# Patient Record
Sex: FEMALE | Race: WHITE | NOT HISPANIC OR LATINO | Employment: FULL TIME | URBAN - METROPOLITAN AREA
[De-identification: names, ages, dates, MRNs, and addresses within clinical notes are randomized per-mention and may not be internally consistent; named-entity substitution may affect disease eponyms.]

---

## 2017-03-13 ENCOUNTER — TELEPHONE (OUTPATIENT)
Dept: NURSING | Facility: CLINIC | Age: 32
End: 2017-03-13

## 2017-03-13 ENCOUNTER — OFFICE VISIT (OUTPATIENT)
Dept: FAMILY MEDICINE | Facility: CLINIC | Age: 32
End: 2017-03-13
Payer: COMMERCIAL

## 2017-03-13 VITALS
SYSTOLIC BLOOD PRESSURE: 112 MMHG | DIASTOLIC BLOOD PRESSURE: 76 MMHG | HEART RATE: 79 BPM | TEMPERATURE: 98 F | OXYGEN SATURATION: 100 % | BODY MASS INDEX: 30.52 KG/M2 | HEIGHT: 66 IN | WEIGHT: 189.9 LBS

## 2017-03-13 DIAGNOSIS — N89.8 VAGINAL ODOR: ICD-10-CM

## 2017-03-13 DIAGNOSIS — F41.9 ANXIETY: ICD-10-CM

## 2017-03-13 DIAGNOSIS — F33.0 MAJOR DEPRESSIVE DISORDER, RECURRENT EPISODE, MILD (H): Primary | ICD-10-CM

## 2017-03-13 DIAGNOSIS — O24.410 DIET CONTROLLED GESTATIONAL DIABETES MELLITUS (GDM), ANTEPARTUM: ICD-10-CM

## 2017-03-13 PROCEDURE — 99213 OFFICE O/P EST LOW 20 MIN: CPT | Performed by: INTERNAL MEDICINE

## 2017-03-13 RX ORDER — ESCITALOPRAM OXALATE 10 MG/1
10 TABLET ORAL EVERY EVENING
Qty: 90 TABLET | Refills: 1 | Status: SHIPPED | OUTPATIENT
Start: 2017-03-13 | End: 2017-09-14

## 2017-03-13 ASSESSMENT — ANXIETY QUESTIONNAIRES
3. WORRYING TOO MUCH ABOUT DIFFERENT THINGS: MORE THAN HALF THE DAYS
GAD7 TOTAL SCORE: 13
5. BEING SO RESTLESS THAT IT IS HARD TO SIT STILL: SEVERAL DAYS
1. FEELING NERVOUS, ANXIOUS, OR ON EDGE: NEARLY EVERY DAY
IF YOU CHECKED OFF ANY PROBLEMS ON THIS QUESTIONNAIRE, HOW DIFFICULT HAVE THESE PROBLEMS MADE IT FOR YOU TO DO YOUR WORK, TAKE CARE OF THINGS AT HOME, OR GET ALONG WITH OTHER PEOPLE: VERY DIFFICULT
6. BECOMING EASILY ANNOYED OR IRRITABLE: SEVERAL DAYS
2. NOT BEING ABLE TO STOP OR CONTROL WORRYING: MORE THAN HALF THE DAYS
7. FEELING AFRAID AS IF SOMETHING AWFUL MIGHT HAPPEN: MORE THAN HALF THE DAYS

## 2017-03-13 ASSESSMENT — PATIENT HEALTH QUESTIONNAIRE - PHQ9: 5. POOR APPETITE OR OVEREATING: MORE THAN HALF THE DAYS

## 2017-03-13 NOTE — PROGRESS NOTES
"  SUBJECTIVE:                                                    Abi Dawkins is a 32 year old female who presents to clinic today for the following health issues:    Chief Complaint   Patient presents with     Recheck Medication     discuss depression and anxiety      Abi is a pleasant 31 YO female here to discuss restarting Lexapro. Had a baby boy since last OV (Oct. 3rd 2016), she reports that she is sleeping better lately--not currently breast feeding or pumping.   Not currently taking Lexapro-- DCed prior to pregnancy. She feels that currently she is experiencing more anxiety and is uncertain how much of it is related to being a new parent. Many years ago Abi reports having worked with a counselor but no longer follows with one. Abi reports that her sex drive has decreased lately too.   Had dx of gestational diabetes--reports that she checked her BG daily with no high readings despite no medication and no diet changes average range of 80s fasting in the AM - just had high readings in the OB office.  Pt reports that she has been having increased vaginal odor--follows with OBGYN, has not begun regular menses again post partum, denies abnormal discharge.    Problem list and histories reviewed & adjusted, as indicated.    ROS:  Detailed as above    This document serves as a record of the services and decisions personally performed and made by Joyce Powell DO. It was created on his/her behalf by Zonia Hernandez, a trained medical scribe. The creation of this document is based the provider's statements to the medical scribe.  Scribkirit Hernandez 8:49 AM, March 13, 2017    OBJECTIVE:                                                    /76 (BP Location: Right arm, Patient Position: Chair, Cuff Size: Adult Regular)  Pulse 79  Temp 98  F (36.7  C) (Oral)  Ht 5' 6\" (1.676 m)  Wt 189 lb 14.4 oz (86.1 kg)  SpO2 100%  Breastfeeding? No  BMI 30.65 kg/m2  Body mass index is 30.65 kg/(m^2).  Alert, " pleasant, NAD  Mood euthymic     ASSESSMENT/PLAN:                                                      1. Major depressive disorder, recurrent episode, mild (H)  Will follow up in 1-2 months to determine if 10 mg dosage is appropriate and complete PHQ-9.  Requested 90 day supply since had been on this medication in the past.  - MENTAL HEALTH REFERRAL  - escitalopram (LEXAPRO) 10 MG tablet; Take 1 tablet (10 mg) by mouth every evening  Dispense: 90 tablet; Refill: 1    PHQ-9 SCORE 2/23/2016 11/15/2016 3/13/2017   Total Score - - -   Total Score 4 4 14       2. Anxiety  Post partum--Oct 2016 birth 1st child. Pt reports feeling more anxious as a new parent.   Will follow up in 1-2 months and complete LUCERO-7 again.   Counseling advised.  No imminent safety concerns noted for Abi or baby at this time  - MENTAL HEALTH REFERRAL  - escitalopram (LEXAPRO) 10 MG tablet; Take 1 tablet (10 mg) by mouth every evening  Dispense: 90 tablet; Refill: 1    LUCERO-7 SCORE 2/23/2016 11/15/2016 3/13/2017   Total Score - - -   Total Score 3 4 13     3. Diet controlled gestational diabetes mellitus (GDM), antepartum  Pt denies daily at home BG checks ever being above 200 with average sugars around 80 fasting in the AM.   Follows with OB/GYN and was diet controlled throughout pregnancy.    4. Vaginal odor  Hx of BV post partum.  Denies abnormal discharge, burning with urination or itching at this time - just a different odor.   Counseled patient to follow up with OB/GYN if symptoms persist.     Patient Instructions   You will be contacted to schedule an appointment with a counselor.  Begin taking 10 mg Lexapro daily-- it can make you sleepy.  Follow up with me in 1-2 month by phone--we will help set this up.    The information in this document, created by the medical scribe for me, accurately reflects the services I personally performed and the decisions made by me. I have reviewed and approved this document for accuracy prior to leaving the  patient care area.  Joyce Powell DO  8:49 AM, 03/13/17    Joyce Powell DO  [facility]

## 2017-03-13 NOTE — TELEPHONE ENCOUNTER
Pt seen for BV 10/14/16. Pt is currently having a lot of vagina order. Pt has to change underwear aleast twice a day. Pt denies vaginal itching. No fever. Informed pt she needs to be seen to determine if the BV has returned.

## 2017-03-13 NOTE — NURSING NOTE
"Chief Complaint   Patient presents with     Recheck Medication     discuss depression and anxiety        Initial /76 (BP Location: Right arm, Patient Position: Chair, Cuff Size: Adult Regular)  Pulse 79  Temp 98  F (36.7  C) (Oral)  Ht 5' 6\" (1.676 m)  Wt 189 lb 14.4 oz (86.1 kg)  SpO2 100%  Breastfeeding? No  BMI 30.65 kg/m2 Estimated body mass index is 30.65 kg/(m^2) as calculated from the following:    Height as of this encounter: 5' 6\" (1.676 m).    Weight as of this encounter: 189 lb 14.4 oz (86.1 kg).  Medication Reconciliation: complete     ALBERTA Sanchez      "

## 2017-03-13 NOTE — PATIENT INSTRUCTIONS
You will be contacted to schedule an appointment with a counselor.  Begin taking 10 mg Lexapro daily-- it can make you sleepy.  Follow up with me in 1-2 month by phone--we will help set this up.

## 2017-03-13 NOTE — LETTER
My Depression Action Plan  Name: Abi Dawkins   Date of Birth 1985  Date: 3/13/2017    My doctor: Joyce Powell   My clinic: [address] [id number] [phone]          GREEN    ZONE   Good Control    What it looks like:     Things are going generally well. You have normal up s and down s. You may even feel depressed from time to time, but bad moods usually last less than a day.   What you need to do:  1. Continue to care for yourself (see self care plan)  2. Check your depression survival kit and update it as needed  3. Follow your physician s recommendations including any medication.  4. Do not stop taking medication unless you consult with your physician first.           YELLOW         ZONE Getting Worse    What it looks like:     Depression is starting to interfere with your life.     It may be hard to get out of bed; you may be starting to isolate yourself from others.    Symptoms of depression are starting to last most all day and this has happened for several days.     You may have suicidal thoughts but they are not constant.   What you need to do:     1. Call your care team, your response to treatment will improve if you keep your care team informed of your progress. Yellow periods are signs an adjustment may need to be made.     2. Continue your self-care, even if you have to fake it!    3. Talk to someone in your support network    4. Open up your depression survival kit           RED    ZONE Medical Alert - Get Help    What it looks like:     Depression is seriously interfering with your life.     You may experience these or other symptoms: You can t get out of bed most days, can t work or engage in other necessary activities, you have trouble taking care of basic hygiene, or basic responsibilities, thoughts of suicide or death that will not go away, self-injurious behavior.     What you need to do:  1. Call your care team and request  a same-day appointment. If they are not available (weekends or after hours) call your local crisis line, emergency room or 911.      Electronically signed by: Joyce Powell, March 13, 2017    Depression Self Care Plan / Survival Kit    Self-Care for Depression  Here s the deal. Your body and mind are really not as separate as most people think.  What you do and think affects how you feel and how you feel influences what you do and think. This means if you do things that people who feel good do, it will help you feel better.  Sometimes this is all it takes.  There is also a place for medication and therapy depending on how severe your depression is, so be sure to consult with your medical provider and/ or Behavioral Health Consultant if your symptoms are worsening or not improving.     In order to better manage my stress, I will:    Exercise  Get some form of exercise, every day. This will help reduce pain and release endorphins, the  feel good  chemicals in your brain. This is almost as good as taking antidepressants!  This is not the same as joining a gym and then never going! (they count on that by the way ) It can be as simple as just going for a walk or doing some gardening, anything that will get you moving.      Hygiene   Maintain good hygiene (Get out of bed in the morning, Make your bed, Brush your teeth, Take a shower, and Get dressed like you were going to work, even if you are unemployed).  If your clothes don't fit try to get ones that do.    Diet  I will strive to eat foods that are good for me, drink plenty of water, and avoid excessive sugar, caffeine, alcohol, and other mood-altering substances.  Some foods that are helpful in depression are: complex carbohydrates, B vitamins, flaxseed, fish or fish oil, fresh fruits and vegetables.    Psychotherapy  I agree to participate in Individual Therapy (if recommended).    Medication  If prescribed medications, I agree to take them.  Missing doses can  result in serious side effects.  I understand that drinking alcohol, or other illicit drug use, may cause potential side effects.  I will not stop my medication abruptly without first discussing it with my provider.    Staying Connected With Others  I will stay in touch with my friends, family members, and my primary care provider/team.    Use your imagination  Be creative.  We all have a creative side; it doesn t matter if it s oil painting, sand castles, or mud pies! This will also kick up the endorphins.    Witness Beauty  (AKA stop and smell the roses) Take a look outside, even in mid-winter. Notice colors, textures. Watch the squirrels and birds.     Service to others  Be of service to others.  There is always someone else in need.  By helping others we can  get out of ourselves  and remember the really important things.  This also provides opportunities for practicing all the other parts of the program.    Humor  Laugh and be silly!  Adjust your TV habits for less news and crime-drama and more comedy.    Control your stress  Try breathing deep, massage therapy, biofeedback, and meditation. Find time to relax each day.     My support system    Clinic Contact:  Phone number:    Contact 1:  Phone number:    Contact 2:  Phone number:    Anabaptist/:  Phone number:    Therapist:  Phone number:    Local crisis center:    Phone number:    Other community support:  Phone number:

## 2017-03-13 NOTE — MR AVS SNAPSHOT
After Visit Summary   3/13/2017    Abi Dawkins    MRN: [id number]           Patient Information     Date Of Birth          1985        Visit Information        Provider Department      3/13/2017 8:30 AM Joyce Powell, DO [facility]        Today's Diagnoses     Major depressive disorder, recurrent episode, mild (H)    -  1    Anxiety          Care Instructions    You will be contacted to schedule an appointment with a counselor.  Begin taking 10 mg Lexapro daily-- it can make you sleepy.  Follow up with me in 1-2 month by phone--we will help set this up.        Follow-ups after your visit        Additional Services     MENTAL HEALTH REFERRAL       Your provider has referred you to: [facility]: [facility] - Counseling (Individual/Couples/Family) - [facility] [place] [phone]   [email or web address]   *Patient will be contacted by [facility] scheduling partner, [facility]), to schedule an appointment.  Patients may also call [facility] to schedule.    All scheduling is subject to the client's specific insurance plan & benefits, provider/location availability, and provider clinical specialities.  Please arrive 15 minutes early for your first appointment and bring your completed paperwork.    Please be aware that coverage of these services is subject to the terms and limitations of your health insurance plan.  Call member services at your health plan with any benefit or coverage questions.                  Who to contact     If you have questions or need follow up information about today's clinic visit or your schedule please contact [facility] directly at [phone].  Normal or non-critical lab and imaging results will be communicated to you by MyChart, letter or phone within 4 business days after the clinic has received the results. If you do not hear from us within 7 days, please  "contact the clinic through [employer] or phone. If you have a critical or abnormal lab result, we will notify you by phone as soon as possible.  Submit refill requests through [employer] or call your pharmacy and they will forward the refill request to us. Please allow 3 business days for your refill to be completed.          Additional Information About Your Visit        [facility] Information     [employer] gives you secure access to your electronic health record. If you see a primary care provider, you can also send messages to your care team and make appointments. If you have questions, please call your primary care clinic.  If you do not have a primary care provider, please call [phone] and they will assist you.        Care EveryWhere ID     This is your Care EveryWhere ID. This could be used by other organizations to access your [place] medical records  [id number]        Your Vitals Were     Pulse Temperature Height Pulse Oximetry Breastfeeding? BMI (Body Mass Index)    79 98  F (36.7  C) (Oral) 5' 6\" (1.676 m) 100% No 30.65 kg/m2       Blood Pressure from Last 3 Encounters:   03/13/17 112/76   11/15/16 106/60   10/14/16 128/86    Weight from Last 3 Encounters:   03/13/17 189 lb 14.4 oz (86.1 kg)   11/15/16 188 lb (85.3 kg)   10/14/16 188 lb (85.3 kg)              We Performed the Following     MENTAL HEALTH REFERRAL          Today's Medication Changes          These changes are accurate as of: 3/13/17  9:07 AM.  If you have any questions, ask your nurse or doctor.               Start taking these medicines.        Dose/Directions    escitalopram 10 MG tablet   Commonly known as:  LEXAPRO   Used for:  Major depressive disorder, recurrent episode, mild (H), Anxiety   Started by:  Joyce Powell,         Dose:  10 mg   Take 1 tablet (10 mg) by mouth every evening   Quantity:  90 tablet   Refills:  1         Stop taking these medicines if you haven't already. Please contact your care team if you have questions.  "    order for DME   Stopped by:  Joyce Powell DO                Where to get your medicines      These medications were sent to [facility]/pharmacy #[id number] - [place], [place] - [address]  [address] [address]     Phone:  [phone]     escitalopram 10 MG tablet                Primary Care Provider Office Phone # Fax #    Joyce Powell [phone]       [place] [address]   [facility] [address]        Thank you!     Thank you for choosing [facility]  for your care. Our goal is always to provide you with excellent care. Hearing back from our patients is one way we can continue to improve our services. Please take a few minutes to complete the written survey that you may receive in the mail after your visit with us. Thank you!             Your Updated Medication List - Protect others around you: Learn how to safely use, store and throw away your medicines at www.disposemymeds.org.          This list is accurate as of: 3/13/17  9:07 AM.  Always use your most recent med list.                   Brand Name Dispense Instructions for use    escitalopram 10 MG tablet    LEXAPRO    90 tablet    Take 1 tablet (10 mg) by mouth every evening       ibuprofen 600 MG tablet    ADVIL/MOTRIN     Take 600 mg by mouth every 8 hours as needed for moderate pain       TYLENOL PO

## 2017-03-14 ENCOUNTER — OFFICE VISIT (OUTPATIENT)
Dept: OBGYN | Facility: CLINIC | Age: 32
End: 2017-03-14
Payer: COMMERCIAL

## 2017-03-14 VITALS — DIASTOLIC BLOOD PRESSURE: 74 MMHG | WEIGHT: 189 LBS | SYSTOLIC BLOOD PRESSURE: 104 MMHG | BODY MASS INDEX: 30.51 KG/M2

## 2017-03-14 DIAGNOSIS — N89.8 VAGINAL DISCHARGE: Primary | ICD-10-CM

## 2017-03-14 LAB
MICRO REPORT STATUS: NORMAL
SPECIMEN SOURCE: NORMAL
WET PREP SPEC: NORMAL

## 2017-03-14 PROCEDURE — 99213 OFFICE O/P EST LOW 20 MIN: CPT | Performed by: OBSTETRICS & GYNECOLOGY

## 2017-03-14 PROCEDURE — 87210 SMEAR WET MOUNT SALINE/INK: CPT | Performed by: OBSTETRICS & GYNECOLOGY

## 2017-03-14 RX ORDER — PROPRANOLOL HYDROCHLORIDE 20 MG/1
TABLET ORAL
COMMUNITY
Start: 2010-04-09 | End: 2017-12-13

## 2017-03-14 RX ORDER — RIZATRIPTAN BENZOATE 5 MG/1
TABLET, ORALLY DISINTEGRATING ORAL
COMMUNITY
Start: 2011-01-31 | End: 2017-12-13

## 2017-03-14 ASSESSMENT — PATIENT HEALTH QUESTIONNAIRE - PHQ9: SUM OF ALL RESPONSES TO PHQ QUESTIONS 1-9: 14

## 2017-03-14 ASSESSMENT — ANXIETY QUESTIONNAIRES: GAD7 TOTAL SCORE: 13

## 2017-03-14 NOTE — PROGRESS NOTES
SUBJECTIVE:                                                   Abi Dawkins is a 32 year old female who presents to clinic today for the following health issue(s):  Patient presents with:  Vaginal Problem: discharge and odor        HPI:  Patient has had odor and some discharge for a few weeks.   Stopped breast feeding 3 wks ago  No itching  WS negative  I think she just has a shift in her helen related to low estrogen causing elevation of her vaginal ph.   I would just wait this out till her cycles start again.     No LMP recorded..   Patient is not sexually active, .  Using none for contraception.    reports that she has never smoked. She has never used smokeless tobacco.    STD testing offered?  Declined    Health maintenance updated:  yes    Today's PHQ-2 Score:   PHQ-2 (  [employer]) 3/13/2017   Q1: Little interest or pleasure in doing things 1   Q2: Feeling down, depressed or hopeless 2   PHQ-2 Score 3     Today's PHQ-9 Score:   PHQ-9 SCORE 3/13/2017   Total Score -   Total Score 14     Today's LUCERO-7 Score:   LUCERO-7 SCORE 3/13/2017   Total Score -   Total Score 13       Problem list and histories reviewed & adjusted, as indicated.  Additional history: as documented.    Patient Active Problem List   Diagnosis     Anxiety     Migraines     Hyperlipidemia with target LDL less than 130     Major depression/anxiety  in complete remission since 12 on meds      Open wound s/po sut removal 7-31-15 and inital suturing 7-19-15      Dislocation of toe of left foot and contusion at 2nd MT joint DOI 7-19-15     Major depressive disorder, recurrent episode, mild (H)     Past Surgical History   Procedure Laterality Date     Ent surgery       typanamastoidectomy     Ent surgery Left [id number]     10 T-tube placements     Lasik customvue bilateral Bilateral 2015     Procedure: LASIK CUSTOMVUE BILATERAL;  Surgeon: Maycol Bella MD;  Location: [facility]     [place] screening Bilateral 2015      Procedure: WAVESCAN SCREENING;  Surgeon: Jena, Maycol RAGLAND MD;  [facility]      Social History   Substance Use Topics     Smoking status: Never Smoker     Smokeless tobacco: Never Used     Alcohol use No      Problem (# of Occurrences) Relation (Name,Age of Onset)    Allergies (2) Father, Brother    Anxiety Disorder (1) Brother    Asthma (1) Brother    DIABETES (1) Father    High cholesterol (2) Father, Brother    Hypertension (2) Father, Brother            Current Outpatient Prescriptions   Medication Sig     propranolol (INDERAL) 20 MG tablet      rizatriptan (MAXALT-MLT) 5 MG ODT tab LW Addl Instr:Place on tongue to dissolve 1 tablet at the onset of a typical  headache as needed.  May repeat after 2 hours if headache still  present.  Maximum of 3 tablets/24 hours.  Maximum 9 days/month.  Indicated for: Migraine Headaches     escitalopram (LEXAPRO) 10 MG tablet Take 1 tablet (10 mg) by mouth every evening     ibuprofen (ADVIL,MOTRIN) 600 MG tablet Take 600 mg by mouth every 8 hours as needed for moderate pain     Acetaminophen (TYLENOL PO)      No current facility-administered medications for this visit.      Allergies   Allergen Reactions     Cats      Runny nose, redness under eyes, sniffling       ROS:  12 point review of systems negative other than symptoms noted below.  Genitourinary: Vaginal Discharge and Vaginal odor    OBJECTIVE:     /74  Wt 85.7 kg (189 lb)  Breastfeeding? No  BMI 30.51 kg/m2  Body mass index is 30.51 kg/(m^2).    Exam:  Constitutional:  Appearance: Well nourished, well developed alert, in no acute distress  Pelvic Exam:  External Genitalia:     Normal appearance for age, no discharge present, no tenderness present, no inflammatory lesions present, color normal  Vagina:     Normal vaginal vault without central or paravaginal defects, no discharge present, no inflammatory lesions present, no masses present  Bladder:     Nontender to palpation  Urethra:   Urethral Body:  Urethra  palpation normal, urethra structural support normal   Urethral Meatus:  No erythema or lesions present  Cervix:     Appearance healthy, no lesions present, nontender to palpation, no bleeding present  Uterus:     Nontender to palpation, no masses present, position anteflexed, mobility: normal  Adnexa:     No adnexal tenderness present, no adnexal masses present  Perineum:     Perineum within normal limits, no evidence of trauma, no rashes or skin lesions present  Anus:     Anus within normal limits, no hemorrhoids present  Inguinal Lymph Nodes:     No lymphadenopathy present  Pubic Hair:     Normal pubic hair distribution for age  Genitalia and Groin:     No rashes present, no lesions present, no areas of discoloration, no masses present     In-Clinic Test Results:  Results for orders placed or performed in visit on 03/14/17 (from the past 24 hour(s))   Wet prep   Result Value Ref Range    Specimen Description Vagina     Wet Prep       No yeast seen  No clue cells seen  No Trichomonas seen      Micro Report Status FINAL 03/14/2017        ASSESSMENT/PLAN:                                                        ICD-10-CM    1. Vaginal discharge N89.8 Wet prep       There are no Patient Instructions on file for this visit.    Face to face time 15 minute  Discussed vaginal discharge and infections    Radha Macias MD  [facility]

## 2017-03-14 NOTE — MR AVS SNAPSHOT
After Visit Summary   3/14/2017    Abi Dawkins    MRN: [id number]           Patient Information     Date Of Birth          1985        Visit Information        Provider Department      3/14/2017 8:30 AM Radha Macias MD [facility]        Today's Diagnoses     Vaginal discharge    -  1       Follow-ups after your visit        Who to contact     If you have questions or need follow up information about today's clinic visit or your schedule please contact [facility] directly at [phone].  Normal or non-critical lab and imaging results will be communicated to you by TutorGrouphart, letter or phone within 4 business days after the clinic has received the results. If you do not hear from us within 7 days, please contact the clinic through Salsifyt or phone. If you have a critical or abnormal lab result, we will notify you by phone as soon as possible.  Submit refill requests through Zaldiva or call your pharmacy and they will forward the refill request to us. Please allow 3 business days for your refill to be completed.          Additional Information About Your Visit        MyChart Information     Zaldiva gives you secure access to your electronic health record. If you see a primary care provider, you can also send messages to your care team and make appointments. If you have questions, please call your primary care clinic.  If you do not have a primary care provider, please call [phone] and they will assist you.        Care EveryWhere ID     This is your Care EveryWhere ID. This could be used by other organizations to access your [facility] medical records  [id number]        Your Vitals Were     Breastfeeding? BMI (Body Mass Index)                No 30.51 kg/m2           Blood Pressure from Last 3 Encounters:   03/14/17 104/74   03/13/17 112/76   11/15/16 106/60    Weight from Last 3 Encounters:   03/14/17 85.7 kg (189 lb)   03/13/17 86.1 kg (189 lb  14.4 oz)   11/15/16 85.3 kg (188 lb)              We Performed the Following     Wet prep        Primary Care Provider Office Phone # Fax #    Joyce Powell [phone]       [facility] [address] [facility] [address]  [facility] [address]        Thank you!     Thank you for choosing [facility]  for your care. Our goal is always to provide you with excellent care. Hearing back from our patients is one way we can continue to improve our services. Please take a few minutes to complete the written survey that you may receive in the mail after your visit with us. Thank you!             Your Updated Medication List - Protect others around you: Learn how to safely use, store and throw away your medicines at www.disposemymeds.org.          This list is accurate as of: 3/14/17  2:09 PM.  Always use your most recent med list.                   Brand Name Dispense Instructions for use    escitalopram 10 MG tablet    LEXAPRO    90 tablet    Take 1 tablet (10 mg) by mouth every evening       ibuprofen 600 MG tablet    ADVIL/MOTRIN     Take 600 mg by mouth every 8 hours as needed for moderate pain       propranolol 20 MG tablet    INDERAL         rizatriptan 5 MG ODT tab    MAXALT-MLT     LW Addl Instr:Place on tongue to dissolve 1 tablet at the onset of a typical  headache as needed.  May repeat after 2 hours if headache still  present.  Maximum of 3 tablets/24 hours.  Maximum 9 days/month.  Indicated for: Migraine Headaches       TYLENOL PO

## 2017-03-17 ENCOUNTER — TELEPHONE (OUTPATIENT)
Dept: FAMILY MEDICINE | Facility: CLINIC | Age: 32
End: 2017-03-17

## 2017-03-17 NOTE — TELEPHONE ENCOUNTER
Patient needs f/u visit for medication check in roughly 6 weeks  This can be via phone (if covered by her insurance) or in clinic  Please call her to get this follow up scheduled  Thanks!

## 2017-03-26 PROBLEM — O24.410 DIET CONTROLLED GESTATIONAL DIABETES MELLITUS (GDM), ANTEPARTUM: Status: ACTIVE | Noted: 2017-03-26

## 2017-05-01 ENCOUNTER — TELEPHONE (OUTPATIENT)
Dept: FAMILY MEDICINE | Facility: CLINIC | Age: 32
End: 2017-05-01

## 2017-05-01 ENCOUNTER — VIRTUAL VISIT (OUTPATIENT)
Dept: FAMILY MEDICINE | Facility: CLINIC | Age: 32
End: 2017-05-01
Payer: COMMERCIAL

## 2017-05-01 DIAGNOSIS — F33.0 MAJOR DEPRESSIVE DISORDER, RECURRENT EPISODE, MILD (H): ICD-10-CM

## 2017-05-01 DIAGNOSIS — F41.9 ANXIETY: ICD-10-CM

## 2017-05-01 PROCEDURE — 99441 ZZC PHYSICIAN TELEPHONE EVALUATION 5-10 MIN: CPT | Performed by: INTERNAL MEDICINE

## 2017-05-01 RX ORDER — ESCITALOPRAM OXALATE 10 MG/1
10 TABLET ORAL EVERY EVENING
Qty: 90 TABLET | Refills: 1 | Status: CANCELLED | OUTPATIENT
Start: 2017-05-01

## 2017-05-01 ASSESSMENT — ANXIETY QUESTIONNAIRES
6. BECOMING EASILY ANNOYED OR IRRITABLE: SEVERAL DAYS
2. NOT BEING ABLE TO STOP OR CONTROL WORRYING: NOT AT ALL
3. WORRYING TOO MUCH ABOUT DIFFERENT THINGS: NOT AT ALL
1. FEELING NERVOUS, ANXIOUS, OR ON EDGE: SEVERAL DAYS
IF YOU CHECKED OFF ANY PROBLEMS ON THIS QUESTIONNAIRE, HOW DIFFICULT HAVE THESE PROBLEMS MADE IT FOR YOU TO DO YOUR WORK, TAKE CARE OF THINGS AT HOME, OR GET ALONG WITH OTHER PEOPLE: NOT DIFFICULT AT ALL
5. BEING SO RESTLESS THAT IT IS HARD TO SIT STILL: NOT AT ALL
GAD7 TOTAL SCORE: 3
7. FEELING AFRAID AS IF SOMETHING AWFUL MIGHT HAPPEN: SEVERAL DAYS

## 2017-05-01 ASSESSMENT — PATIENT HEALTH QUESTIONNAIRE - PHQ9: 5. POOR APPETITE OR OVEREATING: NOT AT ALL

## 2017-05-01 NOTE — MR AVS SNAPSHOT
After Visit Summary   5/1/2017    Abi Dawkins    MRN: [id number]           Patient Information     Date Of Birth          1985        Visit Information        Provider Department      5/1/2017 1:00 PM Joyce Powell, DO [facility]        Today's Diagnoses     Major depressive disorder, recurrent episode, mild (H)        Anxiety           Follow-ups after your visit        Who to contact     If you have questions or need follow up information about today's clinic visit or your schedule please contact [facility] directly at [phone].  Normal or non-critical lab and imaging results will be communicated to you by So Protect Mehart, letter or phone within 4 business days after the clinic has received the results. If you do not hear from us within 7 days, please contact the clinic through Global Axcesst or phone. If you have a critical or abnormal lab result, we will notify you by phone as soon as possible.  Submit refill requests through iPierian or call your pharmacy and they will forward the refill request to us. Please allow 3 business days for your refill to be completed.          Additional Information About Your Visit        MyChart Information     iPierian gives you secure access to your electronic health record. If you see a primary care provider, you can also send messages to your care team and make appointments. If you have questions, please call your primary care clinic.  If you do not have a primary care provider, please call [phone] and they will assist you.        Care EveryWhere ID     This is your Care EveryWhere ID. This could be used by other organizations to access your [facility] medical records  [id number]         Blood Pressure from Last 3 Encounters:   03/14/17 104/74   03/13/17 112/76   11/15/16 106/60    Weight from Last 3 Encounters:   03/14/17 189 lb (85.7 kg)   03/13/17 189 lb 14.4 oz (86.1 kg)   11/15/16 188 lb (85.3 kg)              Today, you had the  following     No orders found for display       Primary Care Provider Office Phone # Fax #    oJyce Powell [phone]       [facility] [address] [facility] [address]  [facility] [address]        Thank you!     Thank you for choosing [facility]  for your care. Our goal is always to provide you with excellent care. Hearing back from our patients is one way we can continue to improve our services. Please take a few minutes to complete the written survey that you may receive in the mail after your visit with us. Thank you!             Your Updated Medication List - Protect others around you: Learn how to safely use, store and throw away your medicines at www.disposemymeds.org.          This list is accurate as of: 5/1/17 11:59 PM.  Always use your most recent med list.                   Brand Name Dispense Instructions for use    escitalopram 10 MG tablet    LEXAPRO    90 tablet    Take 1 tablet (10 mg) by mouth every evening       ibuprofen 600 MG tablet    ADVIL/MOTRIN     Take 600 mg by mouth every 8 hours as needed for moderate pain       propranolol 20 MG tablet    INDERAL         rizatriptan 5 MG ODT tab    MAXALT-MLT     LW Addl Instr:Place on tongue to dissolve 1 tablet at the onset of a typical  headache as needed.  May repeat after 2 hours if headache still  present.  Maximum of 3 tablets/24 hours.  Maximum 9 days/month.  Indicated for: Migraine Headaches       TYLENOL PO

## 2017-05-01 NOTE — PROGRESS NOTES
"Abi Dawkins is a 32 year old female who is being evaluated via a telephone visit     The patient has been notified of following:     \"This telephone visit will be conducted via a call between you and your physician/provider. We have found that certain health care needs can be provided without the need for a physical exam.  This service lets us provide the care you need with a short phone conversation.  If a prescription is necessary we can send it directly to your pharmacy.  If lab work is needed we can place an order for that and you can then stop by our lab to have the test done at a later time.    We will bill your insurance company for this service.  Please check with your medical insurance if this type of visit is covered. You may be responsible for the cost of this type of visit if insurance coverage is denied.  The typical cost is $30 (10min), $59 (11-20min) and $85 (21-30min).  Most often these visits are shorter than 10 minutes.    If during the course of the call the physician/provider feels a telephone visit is not appropriate, you will not be charged for this service.\"       Consent has been obtained for this service by 2 care team members: yes. See the scanned image in the medical record.    Abi Dawkins [facility] of  [facility]      I have reviewed and updated the patient's Past Medical History, Social History, Family History and Medication List.    ALLERGIES  Cats    Evelyne Nayak (MA signature)    Additional provider notes: Abi is doing really well. Says \"everything just seems better\". Her  has also noticed that she is doing better too. Improved LUCERO and PHQ scores.   Home today with son Kael. She is taking the 10 mg Lexapro which she had been on in the past.   She asks about if she and her  decide to have another baby, what should she do? She had stopped her Lexapro while pregnant and \"I didn't get sad until later after the baby came\".   I suggested she discuss " with me and her OBGYN if she and her  want to expand their family and see where she is in her life at that time. I told her Lexapro is one of the best SSRIs to be on during pregnancy so likely will plan to continue it.    LUCERO-7 SCORE 11/15/2016 3/13/2017 5/1/2017   Total Score - - -   Total Score 4 13 3     PHQ-9 SCORE 11/15/2016 3/13/2017 5/1/2017   Total Score - - -   Total Score 4 14 3       Plan: F/u in clinic in 5 months. She has enough of Lexapro left to get her to that appointment.    I have reviewed the note as documented above.  This accurately captures the substance of my conversation with the patient, Abi Dawkins.    Total time of call between patient and provider was 6 minutes.    Joyce Powell,   [facility]

## 2017-05-02 ASSESSMENT — PATIENT HEALTH QUESTIONNAIRE - PHQ9: SUM OF ALL RESPONSES TO PHQ QUESTIONS 1-9: 3

## 2017-05-02 ASSESSMENT — ANXIETY QUESTIONNAIRES: GAD7 TOTAL SCORE: 3

## 2017-09-14 DIAGNOSIS — F41.9 ANXIETY: ICD-10-CM

## 2017-09-14 DIAGNOSIS — F33.0 MAJOR DEPRESSIVE DISORDER, RECURRENT EPISODE, MILD (H): ICD-10-CM

## 2017-09-14 RX ORDER — ESCITALOPRAM OXALATE 10 MG/1
10 TABLET ORAL DAILY
Qty: 90 TABLET | Refills: 0 | Status: SHIPPED | OUTPATIENT
Start: 2017-09-14 | End: 2017-12-13

## 2017-09-14 NOTE — TELEPHONE ENCOUNTER
Prescription approved per [facility] Refill Protocol.  Barbara LAYTON RN

## 2017-09-14 NOTE — TELEPHONE ENCOUNTER
escitalopram (LEXAPRO) 10 MG tablet     Last Written Prescription Date: 3/13/17  Last Fill Quantity: 90, # refills: 1  Last Office Visit with [facility] primary care provider:  3/13/17        Last PHQ-9 score on record=   PHQ-9 SCORE 5/1/2017   Total Score -   Total Score 3               Layla RGANADOS(R)

## 2017-12-13 ENCOUNTER — OFFICE VISIT (OUTPATIENT)
Dept: FAMILY MEDICINE | Facility: CLINIC | Age: 32
End: 2017-12-13
Payer: COMMERCIAL

## 2017-12-13 VITALS
HEART RATE: 67 BPM | OXYGEN SATURATION: 97 % | TEMPERATURE: 98 F | WEIGHT: 184 LBS | SYSTOLIC BLOOD PRESSURE: 104 MMHG | BODY MASS INDEX: 29.57 KG/M2 | DIASTOLIC BLOOD PRESSURE: 78 MMHG | HEIGHT: 66 IN

## 2017-12-13 DIAGNOSIS — G43.109 MIGRAINE WITH AURA AND WITHOUT STATUS MIGRAINOSUS, NOT INTRACTABLE: ICD-10-CM

## 2017-12-13 DIAGNOSIS — F41.9 ANXIETY: ICD-10-CM

## 2017-12-13 DIAGNOSIS — F33.0 MAJOR DEPRESSIVE DISORDER, RECURRENT EPISODE, MILD (H): Primary | ICD-10-CM

## 2017-12-13 PROCEDURE — 99213 OFFICE O/P EST LOW 20 MIN: CPT | Performed by: INTERNAL MEDICINE

## 2017-12-13 RX ORDER — RIZATRIPTAN BENZOATE 5 MG/1
5 TABLET, ORALLY DISINTEGRATING ORAL
Qty: 9 TABLET | Refills: 3 | Status: SHIPPED | OUTPATIENT
Start: 2017-12-13 | End: 2018-09-21

## 2017-12-13 RX ORDER — PROPRANOLOL HYDROCHLORIDE 20 MG/1
20 TABLET ORAL DAILY PRN
Qty: 30 TABLET | Refills: 1 | Status: SHIPPED | OUTPATIENT
Start: 2017-12-13 | End: 2018-09-21

## 2017-12-13 RX ORDER — ESCITALOPRAM OXALATE 10 MG/1
15 TABLET ORAL DAILY
Qty: 135 TABLET | Refills: 1 | Status: SHIPPED | OUTPATIENT
Start: 2017-12-13 | End: 2018-07-12

## 2017-12-13 NOTE — PROGRESS NOTES
"  SUBJECTIVE:   Abi Dawkins is a 32 year old female who presents to clinic today for the following health issues:      Depression and Anxiety Follow-Up    Status since last visit: No change    Other associated symptoms:None    Complicating factors: None    Significant life event: No     Current substance abuse: None    Abi initially states she feels she is doing ok on the Lexapro, however, we discussed this in more detail as her scores don't reflect this. She seemed puzzled about how to answer the questionnaire and exactly how she felt. She has been very busy with work and that doesn't seem to be letting up soon. Also has a young son. Just says that she finds its easier to stay at home rather than go out but then clarifies that isn't necessarily a bad thing and we discussed a lot of folks do tend to stay in more when they have a family. She does express some general lack of motivation though. She is still very glad she has restarted on Lexapro as it has definitely been a help for her being back on it.    PHQ-9 Score and MyChart F/U Questions 3/13/2017 5/1/2017 12/13/2017   Total Score 14 3 10   Q9: Suicide Ideation Not at all Not at all Not at all     LUCERO-7 SCORE 3/13/2017 5/1/2017 12/13/2017   Total Score - - -   Total Score 13 3 9         Amount of exercise or physical activity: 2-3 days/week for an average of 30-45 minutes    Problems taking medications regularly: No    Medication side effects: none    Diet: regular (no restrictions)          Problem list and histories reviewed & adjusted, as indicated.    ROS:  Detailed as above     OBJECTIVE:     /78  Pulse 67  Temp 98  F (36.7  C)  Ht 5' 6\" (1.676 m)  Wt 184 lb (83.5 kg)  SpO2 97%  Breastfeeding? No  BMI 29.7 kg/m2  Body mass index is 29.7 kg/(m^2).  Alert, pleasant, NAD  Seems a bit quiet and reserved and vague in her speech and isn't very specific in describing how she feels    ASSESSMENT/PLAN:       1. Anxiety and Major depressive " disorder, recurrent episode, mild (H)  Discussed and decided together to increase her Lexapro slightly  Also discussed normal changes and phases of life and how seasonal changes and work/life stressors can impact mood  - escitalopram (LEXAPRO) 10 MG tablet; Take 1.5 tablets (15 mg) by mouth daily In the evening  Dispense: 135 tablet; Refill: 1    2. Migraine with aura and without status migrainosus, not intractable  Migraines well controlled  - rizatriptan (MAXALT-MLT) 5 MG ODT tab; Take 1 tablet (5 mg) by mouth at onset of headache for migraine  Dispense: 9 tablet; Refill: 3  - propranolol (INDERAL) 20 MG tablet; Take 1 tablet (20 mg) by mouth daily as needed  Dispense: 30 tablet; Refill: 1    Patient Instructions   Increase Lexapro to 15 mg daily  I refilled your migraine and propranolol anxiety medication to have on hand  Schedule an appointment with me in the spring to recheck medication and have pap      Joyce Powell DO  [facility]

## 2017-12-13 NOTE — MR AVS SNAPSHOT
After Visit Summary   12/13/2017    Abi Dawkins    MRN: [id number]           Patient Information     Date Of Birth          1985        Visit Information        Provider Department      12/13/2017 4:30 PM Joyce Powell,  [facility]        Today's Diagnoses     Major depressive disorder, recurrent episode, mild (H)    -  1    Anxiety        Migraine with aura and without status migrainosus, not intractable          Care Instructions    Increase Lexapro to 15 mg daily  I refilled your migraine and propranolol anxiety medication to have on hand  Schedule an appointment with me in the spring to recheck medication and have pap          Follow-ups after your visit        Who to contact     If you have questions or need follow up information about today's clinic visit or your schedule please contact [facility] directly at [phone].  Normal or non-critical lab and imaging results will be communicated to you by Flavourshart, letter or phone within 4 business days after the clinic has received the results. If you do not hear from us within 7 days, please contact the clinic through Flavourshart or phone. If you have a critical or abnormal lab result, we will notify you by phone as soon as possible.  Submit refill requests through Graceful Tables or call your pharmacy and they will forward the refill request to us. Please allow 3 business days for your refill to be completed.          Additional Information About Your Visit        MyChart Information     Graceful Tables gives you secure access to your electronic health record. If you see a primary care provider, you can also send messages to your care team and make appointments. If you have questions, please call your primary care clinic.  If you do not have a primary care provider, please call [phone] and they will assist you.        Care EveryWhere ID     This is your Care EveryWhere ID. This could be used by other organizations to access  "your [place] medical records  [id number]        Your Vitals Were     Pulse Temperature Height Pulse Oximetry Breastfeeding? BMI (Body Mass Index)    67 98  F (36.7  C) 5' 6\" (1.676 m) 97% No 29.7 kg/m2       Blood Pressure from Last 3 Encounters:   12/13/17 104/78   03/14/17 104/74   03/13/17 112/76    Weight from Last 3 Encounters:   12/13/17 184 lb (83.5 kg)   03/14/17 189 lb (85.7 kg)   03/13/17 189 lb 14.4 oz (86.1 kg)              Today, you had the following     No orders found for display         Today's Medication Changes          These changes are accurate as of: 12/13/17  5:12 PM.  If you have any questions, ask your nurse or doctor.               These medicines have changed or have updated prescriptions.        Dose/Directions    escitalopram 10 MG tablet   Commonly known as:  LEXAPRO   This may have changed:  how much to take   Used for:  Major depressive disorder, recurrent episode, mild (H), Anxiety   Changed by:  Joyce Powell DO        Dose:  15 mg   Take 1.5 tablets (15 mg) by mouth daily In the evening   Quantity:  135 tablet   Refills:  1       propranolol 20 MG tablet   Commonly known as:  INDERAL   This may have changed:    - how much to take  - when to take this  - reasons to take this   Used for:  Migraine with aura and without status migrainosus, not intractable   Changed by:  Joyce Powell DO        Dose:  20 mg   Take 1 tablet (20 mg) by mouth daily as needed   Quantity:  30 tablet   Refills:  1       rizatriptan 5 MG ODT tab   Commonly known as:  MAXALT-MLT   This may have changed:  See the new instructions.   Used for:  Migraine with aura and without status migrainosus, not intractable   Changed by:  Joyce Powell DO        Dose:  5 mg   Take 1 tablet (5 mg) by mouth at onset of headache for migraine   Quantity:  9 tablet   Refills:  3         Stop taking these medicines if you haven't already. Please contact your care team if you have questions.     ibuprofen 600 MG " tablet   Commonly known as:  ADVIL/MOTRIN   Stopped by:  Joyce Powell DO           TYLENOL PO   Stopped by:  Joyce Powell DO                Where to get your medicines      These medications were sent to [facility]/pharmacy #[id number] - [place], [place] - [address]  [address] [facility] [address]     Phone:  [phone]     escitalopram 10 MG tablet    propranolol 20 MG tablet    rizatriptan 5 MG ODT tab                Primary Care Provider Office Phone # Fax #    Joyce Powell [phone] [address] [facility] [address]  [facility] [address]        Equal Access to Services     SHI[facility]A [facility] : Hadii feliberto walter hadliao Sosanaz, waaxda luqadaha, qaybta kaalmada adeclaytonyada, minerva adam . So [facility] [phone].    ATENCIÓN: Si habla español, tiene a butts disposición servicios gratuitos de asistencia lingüística. Llame al [phone].    We comply with applicable federal civil rights laws and [place] laws. We do not discriminate on the basis of race, color, national origin, age, disability, sex, sexual orientation, or gender identity.            Thank you!     Thank you for choosing [facility]  for your care. Our goal is always to provide you with excellent care. Hearing back from our patients is one way we can continue to improve our services. Please take a few minutes to complete the written survey that you may receive in the mail after your visit with us. Thank you!             Your Updated Medication List - Protect others around you: Learn how to safely use, store and throw away your medicines at www.disposemymeds.org.          This list is accurate as of: 12/13/17  5:12 PM.  Always use your most recent med list.                   Brand Name Dispense Instructions for use Diagnosis    escitalopram 10 MG tablet    LEXAPRO    135 tablet    Take 1.5 tablets (15 mg) by mouth daily In the evening    Major depressive disorder, recurrent episode,  mild (H), Anxiety       propranolol 20 MG tablet    INDERAL    30 tablet    Take 1 tablet (20 mg) by mouth daily as needed    Migraine with aura and without status migrainosus, not intractable       rizatriptan 5 MG ODT tab    MAXALT-MLT    9 tablet    Take 1 tablet (5 mg) by mouth at onset of headache for migraine    Migraine with aura and without status migrainosus, not intractable

## 2017-12-13 NOTE — PATIENT INSTRUCTIONS
Increase Lexapro to 15 mg daily  I refilled your migraine and propranolol anxiety medication to have on hand  Schedule an appointment with me in the spring to recheck medication and have pap

## 2017-12-13 NOTE — NURSING NOTE
"No chief complaint on file.      Initial /78  Pulse 67  Temp 98  F (36.7  C)  Ht 5' 6\" (1.676 m)  Wt 184 lb (83.5 kg)  SpO2 97%  Breastfeeding? No  BMI 29.7 kg/m2 Estimated body mass index is 29.7 kg/(m^2) as calculated from the following:    Height as of this encounter: 5' 6\" (1.676 m).    Weight as of this encounter: 184 lb (83.5 kg).  Medication Reconciliation: complete     Pierre Azar, TAO     "

## 2017-12-14 DIAGNOSIS — F33.0 MAJOR DEPRESSIVE DISORDER, RECURRENT EPISODE, MILD (H): ICD-10-CM

## 2017-12-14 DIAGNOSIS — F41.9 ANXIETY: ICD-10-CM

## 2017-12-15 ASSESSMENT — ANXIETY QUESTIONNAIRES
7. FEELING AFRAID AS IF SOMETHING AWFUL MIGHT HAPPEN: MORE THAN HALF THE DAYS
6. BECOMING EASILY ANNOYED OR IRRITABLE: MORE THAN HALF THE DAYS
5. BEING SO RESTLESS THAT IT IS HARD TO SIT STILL: SEVERAL DAYS
IF YOU CHECKED OFF ANY PROBLEMS ON THIS QUESTIONNAIRE, HOW DIFFICULT HAVE THESE PROBLEMS MADE IT FOR YOU TO DO YOUR WORK, TAKE CARE OF THINGS AT HOME, OR GET ALONG WITH OTHER PEOPLE: SOMEWHAT DIFFICULT
2. NOT BEING ABLE TO STOP OR CONTROL WORRYING: SEVERAL DAYS
1. FEELING NERVOUS, ANXIOUS, OR ON EDGE: SEVERAL DAYS
3. WORRYING TOO MUCH ABOUT DIFFERENT THINGS: SEVERAL DAYS
GAD7 TOTAL SCORE: 9

## 2017-12-15 ASSESSMENT — PATIENT HEALTH QUESTIONNAIRE - PHQ9
SUM OF ALL RESPONSES TO PHQ QUESTIONS 1-9: 10
5. POOR APPETITE OR OVEREATING: SEVERAL DAYS

## 2017-12-16 ASSESSMENT — ANXIETY QUESTIONNAIRES: GAD7 TOTAL SCORE: 9

## 2017-12-18 RX ORDER — ESCITALOPRAM OXALATE 10 MG/1
TABLET ORAL
Refills: 0
Start: 2017-12-18

## 2018-07-12 DIAGNOSIS — F33.0 MAJOR DEPRESSIVE DISORDER, RECURRENT EPISODE, MILD (H): ICD-10-CM

## 2018-07-12 DIAGNOSIS — F41.9 ANXIETY: ICD-10-CM

## 2018-07-12 NOTE — TELEPHONE ENCOUNTER
PHQ-9 SCORE 3/13/2017 5/1/2017 12/15/2017   Total Score - - -   Total Score 14 3 10       LUCERO-7 SCORE 3/13/2017 5/1/2017 12/15/2017   Total Score - - -   Total Score 13 3 9

## 2018-07-12 NOTE — TELEPHONE ENCOUNTER
"Last Written Prescription Date:  12/13/17  Last Fill Quantity: 135 tablet,  # refills: 1   Last office visit: 12/13/2017 with prescribing provider:  Andre   Future Office Visit:      Requested Prescriptions   Pending Prescriptions Disp Refills     escitalopram (LEXAPRO) 10 MG tablet [Pharmacy Med Name: ESCITALOPRAM 10 MG TABLET] 135 tablet 1     Sig: TAKE 1.5 TABLETS (15 MG) BY MOUTH DAILY IN THE EVENING    SSRIs Protocol Failed    7/12/2018 11:35 AM       Failed - PHQ-9 score less than 5 in past 6 months    Please review last PHQ-9 score.          Failed - Recent (6 mo) or future (30 days) visit within the authorizing provider's specialty    Patient had office visit in the last 6 months or has a visit in the next 30 days with authorizing provider or within the authorizing provider's specialty.  See \"Patient Info\" tab in inbasket, or \"Choose Columns\" in Meds & Orders section of the refill encounter.           Passed - Patient is age 18 or older       Passed - No active pregnancy on record       Passed - No positive pregnancy test in last 12 months          "

## 2018-07-13 RX ORDER — ESCITALOPRAM OXALATE 10 MG/1
TABLET ORAL
Qty: 135 TABLET | Refills: 0 | Status: SHIPPED | OUTPATIENT
Start: 2018-07-13 | End: 2018-09-21

## 2018-07-13 NOTE — TELEPHONE ENCOUNTER
Routing refill request to provider for review/approval because:  Was to f/u in spring after dose increase but has not.  Pended a month with info in pharm comments to schedule.  Due for annual exam and follow up from 12/2017 visit  Please authorize if appropriate.  Thanks,  Tasia Dennis RN

## 2018-07-24 ENCOUNTER — HEALTH MAINTENANCE LETTER (OUTPATIENT)
Age: 33
End: 2018-07-24

## 2018-09-05 ENCOUNTER — TELEPHONE (OUTPATIENT)
Dept: FAMILY MEDICINE | Facility: CLINIC | Age: 33
End: 2018-09-05

## 2018-09-05 NOTE — TELEPHONE ENCOUNTER
Reason for Call:  Same Day Appointment, Requested Provider:  Joyce Powell DO    PCP: Joyce Powell    Reason for visit: annual physical, med follow up, post partum f/u    Duration of symptoms: na    Have you been treated for this in the past? Yes    Additional comments: Pt is established with Dr. Powell, Dr. Powell has very limited availability.  Pt is requesting to transition to either Dr. Lara or Josee in place of Dr. Powell.  Scheduled patient pt w/Dr. Lara on Friday 9/21.  Just notifying Dr. Powell of transition    Can we leave a detailed message on this number? Not Applicable    Phone number patient can be reached at: Other phone number:  No call needed    Best Time: na    Call taken on 9/5/2018 at 5:18 PM by aKmini Aj

## 2018-09-21 ENCOUNTER — OFFICE VISIT (OUTPATIENT)
Dept: FAMILY MEDICINE | Facility: CLINIC | Age: 33
End: 2018-09-21
Payer: COMMERCIAL

## 2018-09-21 VITALS
SYSTOLIC BLOOD PRESSURE: 118 MMHG | OXYGEN SATURATION: 98 % | BODY MASS INDEX: 32.14 KG/M2 | DIASTOLIC BLOOD PRESSURE: 79 MMHG | WEIGHT: 200 LBS | HEIGHT: 66 IN | TEMPERATURE: 99.2 F | HEART RATE: 66 BPM

## 2018-09-21 DIAGNOSIS — Z13.220 SCREENING FOR LIPID DISORDERS: ICD-10-CM

## 2018-09-21 DIAGNOSIS — Z79.899 MEDICATION MANAGEMENT: ICD-10-CM

## 2018-09-21 DIAGNOSIS — E66.09 OBESITY DUE TO EXCESS CALORIES WITHOUT SERIOUS COMORBIDITY, UNSPECIFIED CLASSIFICATION: ICD-10-CM

## 2018-09-21 DIAGNOSIS — Z86.32 HISTORY OF GESTATIONAL DIABETES: ICD-10-CM

## 2018-09-21 DIAGNOSIS — F41.9 ANXIETY: ICD-10-CM

## 2018-09-21 DIAGNOSIS — R73.09 ELEVATED GLUCOSE: ICD-10-CM

## 2018-09-21 DIAGNOSIS — F41.8 PERFORMANCE ANXIETY: ICD-10-CM

## 2018-09-21 DIAGNOSIS — F33.0 MAJOR DEPRESSIVE DISORDER, RECURRENT EPISODE, MILD (H): Primary | ICD-10-CM

## 2018-09-21 DIAGNOSIS — G43.109 MIGRAINE WITH AURA AND WITHOUT STATUS MIGRAINOSUS, NOT INTRACTABLE: ICD-10-CM

## 2018-09-21 DIAGNOSIS — Z13.29 SCREENING FOR THYROID DISORDER: ICD-10-CM

## 2018-09-21 PROBLEM — O24.410 DIET CONTROLLED GESTATIONAL DIABETES MELLITUS (GDM), ANTEPARTUM: Status: RESOLVED | Noted: 2017-03-26 | Resolved: 2018-09-21

## 2018-09-21 LAB
ALBUMIN SERPL-MCNC: 4 G/DL (ref 3.4–5)
ALP SERPL-CCNC: 71 U/L (ref 40–150)
ALT SERPL W P-5'-P-CCNC: 22 U/L (ref 0–50)
ANION GAP SERPL CALCULATED.3IONS-SCNC: 6 MMOL/L (ref 3–14)
AST SERPL W P-5'-P-CCNC: 23 U/L (ref 0–45)
BILIRUB SERPL-MCNC: 0.6 MG/DL (ref 0.2–1.3)
BUN SERPL-MCNC: 19 MG/DL (ref 7–30)
CALCIUM SERPL-MCNC: 9.3 MG/DL (ref 8.5–10.1)
CHLORIDE SERPL-SCNC: 102 MMOL/L (ref 94–109)
CO2 SERPL-SCNC: 31 MMOL/L (ref 20–32)
CREAT SERPL-MCNC: 0.97 MG/DL (ref 0.52–1.04)
GFR SERPL CREATININE-BSD FRML MDRD: 67 ML/MIN/1.7M2
GLUCOSE SERPL-MCNC: 69 MG/DL (ref 70–99)
HBA1C MFR BLD: 5 % (ref 0–5.6)
POTASSIUM SERPL-SCNC: 4.5 MMOL/L (ref 3.4–5.3)
PROT SERPL-MCNC: 7.9 G/DL (ref 6.8–8.8)
SODIUM SERPL-SCNC: 139 MMOL/L (ref 133–144)
TSH SERPL DL<=0.005 MIU/L-ACNC: 1.14 MU/L (ref 0.4–4)

## 2018-09-21 PROCEDURE — 99214 OFFICE O/P EST MOD 30 MIN: CPT | Performed by: INTERNAL MEDICINE

## 2018-09-21 PROCEDURE — 36415 COLL VENOUS BLD VENIPUNCTURE: CPT | Performed by: INTERNAL MEDICINE

## 2018-09-21 PROCEDURE — 83036 HEMOGLOBIN GLYCOSYLATED A1C: CPT | Performed by: INTERNAL MEDICINE

## 2018-09-21 PROCEDURE — 84443 ASSAY THYROID STIM HORMONE: CPT | Performed by: INTERNAL MEDICINE

## 2018-09-21 PROCEDURE — 80053 COMPREHEN METABOLIC PANEL: CPT | Performed by: INTERNAL MEDICINE

## 2018-09-21 RX ORDER — PROPRANOLOL HYDROCHLORIDE 20 MG/1
20 TABLET ORAL DAILY PRN
Qty: 30 TABLET | Refills: 1 | Status: SHIPPED | OUTPATIENT
Start: 2018-09-21 | End: 2019-03-11

## 2018-09-21 RX ORDER — ESCITALOPRAM OXALATE 20 MG/1
20 TABLET ORAL DAILY
Qty: 90 TABLET | Refills: 1 | Status: SHIPPED | OUTPATIENT
Start: 2018-09-21 | End: 2019-11-17

## 2018-09-21 RX ORDER — RIZATRIPTAN BENZOATE 5 MG/1
5 TABLET, ORALLY DISINTEGRATING ORAL
Qty: 9 TABLET | Refills: 3 | Status: SHIPPED | OUTPATIENT
Start: 2018-09-21

## 2018-09-21 ASSESSMENT — ANXIETY QUESTIONNAIRES
2. NOT BEING ABLE TO STOP OR CONTROL WORRYING: NOT AT ALL
3. WORRYING TOO MUCH ABOUT DIFFERENT THINGS: NOT AT ALL
6. BECOMING EASILY ANNOYED OR IRRITABLE: SEVERAL DAYS
5. BEING SO RESTLESS THAT IT IS HARD TO SIT STILL: NOT AT ALL
GAD7 TOTAL SCORE: 4
IF YOU CHECKED OFF ANY PROBLEMS ON THIS QUESTIONNAIRE, HOW DIFFICULT HAVE THESE PROBLEMS MADE IT FOR YOU TO DO YOUR WORK, TAKE CARE OF THINGS AT HOME, OR GET ALONG WITH OTHER PEOPLE: NOT DIFFICULT AT ALL
1. FEELING NERVOUS, ANXIOUS, OR ON EDGE: SEVERAL DAYS
7. FEELING AFRAID AS IF SOMETHING AWFUL MIGHT HAPPEN: SEVERAL DAYS

## 2018-09-21 ASSESSMENT — PATIENT HEALTH QUESTIONNAIRE - PHQ9: 5. POOR APPETITE OR OVEREATING: SEVERAL DAYS

## 2018-09-21 NOTE — PROGRESS NOTES
"  SUBJECTIVE:   Abi Dawkins is a 33 year old female who presents to clinic today for the following health issues:    Patient requested transfer of care  She is here to get established  Weight monse      Duration: 1    Description (location/character/radiation): weight gain anxiety and depression    Intensity:  moderate    Accompanying signs and symptoms: none    History (similar episodes/previous evaluation): None    Precipitating or alleviating factors: None    Therapies tried and outcome: None       Patient has history of depression and anxiety  She has been taking Lexapro 20 mg instead of 15  20 mg is working well for her  She would like to continue  She has a history of gestational diabetes  She has been gaining weight also  She had questions regarding gestational diabetes  She had questions regarding her medication  Has concerned about gaining weight    Problem list and histories reviewed & adjusted, as indicated.  Additional history: as documented    Labs reviewed in EPIC    Reviewed and updated as needed this visit by clinical staff  Tobacco  Allergies  Meds  Soc Hx      Reviewed and updated as needed this visit by Provider         ROS:  Constitutional, neuro, ENT, endocrine, pulmonary, cardiac, gastrointestinal, genitourinary, musculoskeletal, integument and psychiatric systems are negative, except as otherwise noted.    OBJECTIVE:                                                    /79 (BP Location: Right arm, Patient Position: Chair, Cuff Size: Adult Large)  Pulse 66  Temp 99.2  F (37.3  C) (Tympanic)  Ht 5' 6\" (1.676 m)  Wt 200 lb (90.7 kg)  LMP 09/11/2018  SpO2 98%  BMI 32.28 kg/m2  Body mass index is 32.28 kg/(m^2).      GENERAL APPEARANCE: healthy, alert and no distress  EYES: Eyes grossly normal to inspection, PERRL and conjunctivae and sclerae normal  HENT: ear canals and TM's normal and nose and mouth without ulcers or lesions  NECK: no adenopathy  RESP: lungs clear to auscultation " - no rales, rhonchi or wheezes  CV: regular rates and rhythm, normal S1 S2, no S3             ASSESSMENT/PLAN:                                                      Abi was seen today for diabetes and recheck medication.    Diagnoses and all orders for this visit:    Major depressive disorder, recurrent episode, mild (H)  -     escitalopram (LEXAPRO) 20 MG tablet; Take 1 tablet (20 mg) by mouth daily  This dose is working well for her  I educated her about medication is side effects and precautions  Discussed with her weight gain could be related to this medication  Controlling the anxiety and depression is also important  So we will continue the current dose  She will let me know when she is ready to lower down the dose    Anxiety  -     escitalopram (LEXAPRO) 20 MG tablet; Take 1 tablet (20 mg) by mouth daily  See the note above    Migraine with aura and without status migrainosus, not intractable  -     rizatriptan (MAXALT-MLT) 5 MG ODT tab; Take 1 tablet (5 mg) by mouth at onset of headache for migraine  Migraine headaches are less frequent and less often and she is using this medication only as needed    Performance anxiety  -     propranolol (INDERAL) 20 MG tablet; Take 1 tablet (20 mg) by mouth daily as needed  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  Referred her to dietitian  Answered her questions    Screening for lipid disorders  -     Lipid panel reflex to direct LDL Fasting; Future    Other orders  -     DEPRESSION ACTION PLAN (DAP)    She is due  for annual physical  She will schedule that at her earliest convenience  Due for Pap smear      Follow up with Provider -in 3-4 weeks and a schedul physical  Patient Instructions     Labs today  The dose of lexapro is changed to 20 mg daily  Make appointment for physical at your earliest convenience  Make early morning appointment and come fasting for cholesterol testing  Make appointment with dietician  Seek sooner medical attention if there is any worsening of symptoms or problems.        Your BMI is Body mass index is 32.28 kg/(m^2).    What is BMI?  Body mass index (BMI) is one way to tell whether you are at a healthy weight, overweight, or obese. It measures your weight in relation to your height.  A BMI of 18.5 to 24.9 is in the healthy range. A person with a BMI of 25 to 29.9 is considered overweight, and someone with a BMI of 30 or greater is considered obese.  Another way to find out if you are at risk for health problems caused by overweight and obesity is to measure your waist. If you are a woman and your waist is more than 35 inches, or if you are a man and your waist is more than 40 inches, your risk of disease may be higher.  More than two-thirds of American adults are considered overweight or obese. Being overweight or obese increases the risk for further weight gain.  Excess weight may lead to heart disease and diabetes. Creating and following plans for healthy eating and physical activity may help you improve your health.    Methods for maintaining or losing weight.  Weight control is part of healthy lifestyle and includes exercise, emotional health, and healthy eating habits.  Careful eating habits lifelong is the mainstay of weight control.  Though there are significant health benefits from weight loss, long-term weight loss with diet alone may be very difficult to achieve- studies show long-term success with dietary management in less than 10% of people. Attaining a healthy weight may be  especially difficult to achieve in those with severe obesity. In some cases, medications, devices and surgical management might be considered.    What can you do?  If you are overweight or obese and are interested in methods for weight loss, you should discuss this with your provider. In addition, we recommend that you review healthy life styles and methods for weight loss available through the National Institutes of Health patient information sites:   Http://win.niddk.nih.gov/publications/index.htm     65.03 kg/(m^2).    What is BMI?  Body mass index (BMI) is one way to tell whether you are at a healthy weight, overweight, or obese. It measures your weight in relation to your height.  A BMI of 18.5 to 24.9 is in the healthy range. A person with a BMI of 25 to 29.9 is considered overweight, and someone with a BMI of 30 or greater is considered obese.  Another way to find out if you are at risk for health problems caused by overweight and obesity is to measure your waist. If you are a woman and your waist is more than 35 inches, or if you are a man and your waist is more than 40 inches, your risk of disease may be higher.  More than two-thirds of American adults are considered overweight or obese. Being overweight or obese increases the risk for further weight gain.  Excess weight may lead to heart disease and diabetes. Creating and following plans for healthy eating and physical activity may help you improve your health.    Methods for maintaining or losing weight.  Weight control is part of healthy lifestyle and includes exercise, emotional health, and healthy eating habits.  Careful eating habits lifelong is the mainstay of weight control.  Though there are significant health benefits from weight loss, long-term weight loss with diet alone may be very difficult to achieve- studies show long-term success with dietary management in less than 10% of people. Attaining a healthy weight may be especially difficult to  achieve in those with severe obesity. In some cases, medications, devices and surgical management might be considered.    What can you do?  If you are overweight or obese and are interested in methods for weight loss, you should discuss this with your provider. In addition, we recommend that you review healthy life styles and methods for weight loss available through the National Institutes of Health patient information sites:   Http://win.niddk.nih.gov/publications/index.htm    Labs today  The dose of lexapro is changed to 20 mg daily  Make appointment for physical at your earliest convenience  Make early morning appointment and come fasting for cholesterol testing  Make appointment with dietician  Seek sooner medical attention if there is any worsening of symptoms or problems.        Candace Lara MD  [facility]

## 2018-09-21 NOTE — LETTER
[address] [facility]  [address]  [place]  [address]  Tel: [phone]    September 24, 2018    Abi Dawkins  [address] [address]        Eleanor Alex,     This is to inform you regarding your test result.     HbA1c which is average glucose during last 3 months is normal.   Your blood sugars are excellent in fact slightly on low side.   The testing of your kidney function, liver function and electrolytes was satisfactory   TSH which is thyroid hormone is normal.       Sincerely,       Dr.Nasima Marco MD,FACP         Enclosure: Lab Results

## 2018-09-21 NOTE — MR AVS SNAPSHOT
After Visit Summary   9/21/2018    Abi Dawkins    MRN: [id number]           Patient Information     Date Of Birth          1985        Visit Information        Provider Department      9/21/2018 8:30 AM Candace Lara MD [facility]        Today's Diagnoses     History of gestational diabetes    -  1    Major depressive disorder, recurrent episode, mild (H)        Migraine with aura and without status migrainosus, not intractable        Screening for malignant neoplasm of cervix        Anxiety        Need for prophylactic vaccination and inoculation against influenza        Screening for thyroid disorder        Medication management        Elevated glucose        Obesity due to excess calories without serious comorbidity, unspecified classification          Care Instructions    Your BMI is Body mass index is 32.28 kg/(m^2).    What is BMI?  Body mass index (BMI) is one way to tell whether you are at a healthy weight, overweight, or obese. It measures your weight in relation to your height.  A BMI of 18.5 to 24.9 is in the healthy range. A person with a BMI of 25 to 29.9 is considered overweight, and someone with a BMI of 30 or greater is considered obese.  Another way to find out if you are at risk for health problems caused by overweight and obesity is to measure your waist. If you are a woman and your waist is more than 35 inches, or if you are a man and your waist is more than 40 inches, your risk of disease may be higher.  More than two-thirds of American adults are considered overweight or obese. Being overweight or obese increases the risk for further weight gain.  Excess weight may lead to heart disease and diabetes. Creating and following plans for healthy eating and physical activity may help you improve your health.    Methods for maintaining or losing weight.  Weight control is part of healthy lifestyle and includes exercise, emotional health, and healthy eating habits.   Careful eating habits lifelong is the mainstay of weight control.  Though there are significant health benefits from weight loss, long-term weight loss with diet alone may be very difficult to achieve- studies show long-term success with dietary management in less than 10% of people. Attaining a healthy weight may be especially difficult to achieve in those with severe obesity. In some cases, medications, devices and surgical management might be considered.    What can you do?  If you are overweight or obese and are interested in methods for weight loss, you should discuss this with your provider. In addition, we recommend that you review healthy life styles and methods for weight loss available through the National Institutes of Health patient information sites:   Http://win.niddk.nih.gov/publications/index.htm     65.03 kg/(m^2).    What is BMI?  Body mass index (BMI) is one way to tell whether you are at a healthy weight, overweight, or obese. It measures your weight in relation to your height.  A BMI of 18.5 to 24.9 is in the healthy range. A person with a BMI of 25 to 29.9 is considered overweight, and someone with a BMI of 30 or greater is considered obese.  Another way to find out if you are at risk for health problems caused by overweight and obesity is to measure your waist. If you are a woman and your waist is more than 35 inches, or if you are a man and your waist is more than 40 inches, your risk of disease may be higher.  More than two-thirds of American adults are considered overweight or obese. Being overweight or obese increases the risk for further weight gain.  Excess weight may lead to heart disease and diabetes. Creating and following plans for healthy eating and physical activity may help you improve your health.    Methods for maintaining or losing weight.  Weight control is part of healthy lifestyle and includes exercise, emotional health, and healthy eating habits.  Careful eating habits  lifelong is the mainstay of weight control.  Though there are significant health benefits from weight loss, long-term weight loss with diet alone may be very difficult to achieve- studies show long-term success with dietary management in less than 10% of people. Attaining a healthy weight may be especially difficult to achieve in those with severe obesity. In some cases, medications, devices and surgical management might be considered.    What can you do?  If you are overweight or obese and are interested in methods for weight loss, you should discuss this with your provider. In addition, we recommend that you review healthy life styles and methods for weight loss available through the National Institutes of Health patient information sites:   Http://win.niddk.nih.gov/publications/index.htm    Labs today  The dose of lexapro is changed to 20 mg daily  Make appointment for physical at your earliest convenience  Make early morning appointment and come fasting for cholesterol testing  Make appointment with dietician  Seek sooner medical attention if there is any worsening of symptoms or problems.                        Follow-ups after your visit        Additional Services     NUTRITION REFERRAL       Your provider has referred you to: G: [facility] [place] [phone]   [email or web address]    Please be aware that coverage of these services is subject to the terms and limitations of your health insurance plan.  Call member services at your health plan with any benefit or coverage questions.      Please bring the following with you to your appointment:    (1) This referral request  (2) Any documents given to you regarding this referral  (3) Any specific questions you have about diet and/or food choices                  Follow-up notes from your care team     Return for Physical Exam.      Who to contact     If you have questions or need follow up information about today's clinic visit  "or your schedule please contact [facility] directly at [phone].  Normal or non-critical lab and imaging results will be communicated to you by MyChart, letter or phone within 4 business days after the clinic has received the results. If you do not hear from us within 7 days, please contact the clinic through [employer] or phone. If you have a critical or abnormal lab result, we will notify you by phone as soon as possible.  Submit refill requests through [employer] or call your pharmacy and they will forward the refill request to us. Please allow 3 business days for your refill to be completed.          Additional Information About Your Visit        Phone [employer] Information     [employer] gives you secure access to your electronic health record. If you see a primary care provider, you can also send messages to your care team and make appointments. If you have questions, please call your primary care clinic.  If you do not have a primary care provider, please call [phone] and they will assist you.        Care EveryWhere ID     This is your Care EveryWhere ID. This could be used by other organizations to access your [place] medical records  [id number]        Your Vitals Were     Pulse Temperature Height Last Period Pulse Oximetry BMI (Body Mass Index)    66 99.2  F (37.3  C) (Tympanic) 5' 6\" (1.676 m) 09/11/2018 98% 32.28 kg/m2       Blood Pressure from Last 3 Encounters:   09/21/18 118/79   12/13/17 104/78   03/14/17 104/74    Weight from Last 3 Encounters:   09/21/18 200 lb (90.7 kg)   12/13/17 184 lb (83.5 kg)   03/14/17 189 lb (85.7 kg)              We Performed the Following     Comprehensive metabolic panel     DEPRESSION ACTION PLAN (DAP)     Hemoglobin A1c     NUTRITION REFERRAL     TSH with free T4 reflex          Today's Medication Changes          These changes are accurate as of 9/21/18  9:11 AM.  If you have any questions, ask your nurse or doctor.               These medicines have changed or " have updated prescriptions.        Dose/Directions    escitalopram 20 MG tablet   Commonly known as:  LEXAPRO   This may have changed:    - medication strength  - See the new instructions.   Used for:  Major depressive disorder, recurrent episode, mild (H), Anxiety   Changed by:  Candace Lara MD        Dose:  20 mg   Take 1 tablet (20 mg) by mouth daily   Quantity:  90 tablet   Refills:  1            Where to get your medicines      These medications were sent to [facility]/pharmacy #[id number] - [place], [place] - [address]  [address] [facility] [address] [address]     Phone:  [phone]     escitalopram 20 MG tablet    propranolol 20 MG tablet    rizatriptan 5 MG ODT tab                Primary Care Provider Office Phone # Fax #    Candace Lara [phone] [address] [facility] [address]  [facility] [address]        Equal Access to Services     [facility]: Hadii aad ku hadasho Viraj, waaxda luqadaha, qaybta kaalmada herlindayada, minerva adam . So [facility] [phone].    ATENCIÓN: Si habla español, tiene a butts disposición servicios gratuitos de asistencia lingüística. Llame al [phone].    We comply with applicable federal civil rights laws and [place] laws. We do not discriminate on the basis of race, color, national origin, age, disability, sex, sexual orientation, or gender identity.            Thank you!     Thank you for choosing [facility]  for your care. Our goal is always to provide you with excellent care. Hearing back from our patients is one way we can continue to improve our services. Please take a few minutes to complete the written survey that you may receive in the mail after your visit with us. Thank you!             Your Updated Medication List - Protect others around you: Learn how to safely use, store and throw away your medicines at www.disposemymeds.org.          This list is accurate as of 9/21/18  9:11 AM.  Always use your most  recent med list.                   Brand Name Dispense Instructions for use Diagnosis    escitalopram 20 MG tablet    LEXAPRO    90 tablet    Take 1 tablet (20 mg) by mouth daily    Major depressive disorder, recurrent episode, mild (H), Anxiety       propranolol 20 MG tablet    INDERAL    30 tablet    Take 1 tablet (20 mg) by mouth daily as needed    Migraine with aura and without status migrainosus, not intractable       rizatriptan 5 MG ODT tab    MAXALT-MLT    9 tablet    Take 1 tablet (5 mg) by mouth at onset of headache for migraine    Migraine with aura and without status migrainosus, not intractable

## 2018-09-21 NOTE — LETTER
My Depression Action Plan  Name: Abi Dawknis   Date of Birth 1985  Date: 9/21/2018    My doctor: Candace Lara   My clinic: [address] [facility] [address] [phone]          GREEN    ZONE   Good Control    What it looks like:     Things are going generally well. You have normal up s and down s. You may even feel depressed from time to time, but bad moods usually last less than a day.   What you need to do:  1. Continue to care for yourself (see self care plan)  2. Check your depression survival kit and update it as needed  3. Follow your physician s recommendations including any medication.  4. Do not stop taking medication unless you consult with your physician first.           YELLOW         ZONE Getting Worse    What it looks like:     Depression is starting to interfere with your life.     It may be hard to get out of bed; you may be starting to isolate yourself from others.    Symptoms of depression are starting to last most all day and this has happened for several days.     You may have suicidal thoughts but they are not constant.   What you need to do:     1. Call your care team, your response to treatment will improve if you keep your care team informed of your progress. Yellow periods are signs an adjustment may need to be made.     2. Continue your self-care, even if you have to fake it!    3. Talk to someone in your support network    4. Open up your depression survival kit           RED    ZONE Medical Alert - Get Help    What it looks like:     Depression is seriously interfering with your life.     You may experience these or other symptoms: You can t get out of bed most days, can t work or engage in other necessary activities, you have trouble taking care of basic hygiene, or basic responsibilities, thoughts of suicide or death that will not go away, self-injurious behavior.     What you need to do:  1. Call your care team and request a  same-day appointment. If they are not available (weekends or after hours) call your local crisis line, emergency room or 911.            Depression Self Care Plan / Survival Kit    Self-Care for Depression  Here s the deal. Your body and mind are really not as separate as most people think.  What you do and think affects how you feel and how you feel influences what you do and think. This means if you do things that people who feel good do, it will help you feel better.  Sometimes this is all it takes.  There is also a place for medication and therapy depending on how severe your depression is, so be sure to consult with your medical provider and/ or Behavioral Health Consultant if your symptoms are worsening or not improving.     In order to better manage my stress, I will:    Exercise  Get some form of exercise, every day. This will help reduce pain and release endorphins, the  feel good  chemicals in your brain. This is almost as good as taking antidepressants!  This is not the same as joining a gym and then never going! (they count on that by the way ) It can be as simple as just going for a walk or doing some gardening, anything that will get you moving.      Hygiene   Maintain good hygiene (Get out of bed in the morning, Make your bed, Brush your teeth, Take a shower, and Get dressed like you were going to work, even if you are unemployed).  If your clothes don't fit try to get ones that do.    Diet  I will strive to eat foods that are good for me, drink plenty of water, and avoid excessive sugar, caffeine, alcohol, and other mood-altering substances.  Some foods that are helpful in depression are: complex carbohydrates, B vitamins, flaxseed, fish or fish oil, fresh fruits and vegetables.    Psychotherapy  I agree to participate in Individual Therapy (if recommended).    Medication  If prescribed medications, I agree to take them.  Missing doses can result in serious side effects.  I understand that drinking  alcohol, or other illicit drug use, may cause potential side effects.  I will not stop my medication abruptly without first discussing it with my provider.    Staying Connected With Others  I will stay in touch with my friends, family members, and my primary care provider/team.    Use your imagination  Be creative.  We all have a creative side; it doesn t matter if it s oil painting, sand castles, or mud pies! This will also kick up the endorphins.    Witness Beauty  (AKA stop and smell the roses) Take a look outside, even in mid-winter. Notice colors, textures. Watch the squirrels and birds.     Service to others  Be of service to others.  There is always someone else in need.  By helping others we can  get out of ourselves  and remember the really important things.  This also provides opportunities for practicing all the other parts of the program.    Humor  Laugh and be silly!  Adjust your TV habits for less news and crime-drama and more comedy.    Control your stress  Try breathing deep, massage therapy, biofeedback, and meditation. Find time to relax each day.     My support system    Clinic Contact:  Phone number:    Contact 1:  Phone number:    Contact 2:  Phone number:    Taoism/:  Phone number:    Therapist:  Phone number:    Local crisis center:    Phone number:    Other community support:  Phone number:

## 2018-09-21 NOTE — PATIENT INSTRUCTIONS
Your BMI is Body mass index is 32.28 kg/(m^2).    What is BMI?  Body mass index (BMI) is one way to tell whether you are at a healthy weight, overweight, or obese. It measures your weight in relation to your height.  A BMI of 18.5 to 24.9 is in the healthy range. A person with a BMI of 25 to 29.9 is considered overweight, and someone with a BMI of 30 or greater is considered obese.  Another way to find out if you are at risk for health problems caused by overweight and obesity is to measure your waist. If you are a woman and your waist is more than 35 inches, or if you are a man and your waist is more than 40 inches, your risk of disease may be higher.  More than two-thirds of American adults are considered overweight or obese. Being overweight or obese increases the risk for further weight gain.  Excess weight may lead to heart disease and diabetes. Creating and following plans for healthy eating and physical activity may help you improve your health.    Methods for maintaining or losing weight.  Weight control is part of healthy lifestyle and includes exercise, emotional health, and healthy eating habits.  Careful eating habits lifelong is the mainstay of weight control.  Though there are significant health benefits from weight loss, long-term weight loss with diet alone may be very difficult to achieve- studies show long-term success with dietary management in less than 10% of people. Attaining a healthy weight may be especially difficult to achieve in those with severe obesity. In some cases, medications, devices and surgical management might be considered.    What can you do?  If you are overweight or obese and are interested in methods for weight loss, you should discuss this with your provider. In addition, we recommend that you review healthy life styles and methods for weight loss available through the National Institutes of Health patient information sites:    Http://win.niddk.nih.gov/publications/index.htm     65.03 kg/(m^2).    What is BMI?  Body mass index (BMI) is one way to tell whether you are at a healthy weight, overweight, or obese. It measures your weight in relation to your height.  A BMI of 18.5 to 24.9 is in the healthy range. A person with a BMI of 25 to 29.9 is considered overweight, and someone with a BMI of 30 or greater is considered obese.  Another way to find out if you are at risk for health problems caused by overweight and obesity is to measure your waist. If you are a woman and your waist is more than 35 inches, or if you are a man and your waist is more than 40 inches, your risk of disease may be higher.  More than two-thirds of American adults are considered overweight or obese. Being overweight or obese increases the risk for further weight gain.  Excess weight may lead to heart disease and diabetes. Creating and following plans for healthy eating and physical activity may help you improve your health.    Methods for maintaining or losing weight.  Weight control is part of healthy lifestyle and includes exercise, emotional health, and healthy eating habits.  Careful eating habits lifelong is the mainstay of weight control.  Though there are significant health benefits from weight loss, long-term weight loss with diet alone may be very difficult to achieve- studies show long-term success with dietary management in less than 10% of people. Attaining a healthy weight may be especially difficult to achieve in those with severe obesity. In some cases, medications, devices and surgical management might be considered.    What can you do?  If you are overweight or obese and are interested in methods for weight loss, you should discuss this with your provider. In addition, we recommend that you review healthy life styles and methods for weight loss available through the National Institutes of Health patient information sites:    Http://win.niddk.nih.gov/publications/index.htm    Labs today  The dose of lexapro is changed to 20 mg daily  Make appointment for physical at your earliest convenience  Make early morning appointment and come fasting for cholesterol testing  Make appointment with dietician  Seek sooner medical attention if there is any worsening of symptoms or problems.

## 2018-09-22 ASSESSMENT — PATIENT HEALTH QUESTIONNAIRE - PHQ9: SUM OF ALL RESPONSES TO PHQ QUESTIONS 1-9: 4

## 2018-09-22 ASSESSMENT — ANXIETY QUESTIONNAIRES: GAD7 TOTAL SCORE: 4

## 2018-09-22 NOTE — PROGRESS NOTES
Eleanor Alex,    This is to inform you regarding your test result.    HbA1c which is average glucose during last 3 months is normal.  Your blood sugars are excellent in fact slightly on low side.  The testing of your kidney function, liver function and electrolytes was satisfactory   TSH which is thyroid hormone is normal.      Sincerely,      Dr.Nasima Marco MD,FACP

## 2018-10-13 DIAGNOSIS — F33.0 MAJOR DEPRESSIVE DISORDER, RECURRENT EPISODE, MILD (H): ICD-10-CM

## 2018-10-13 DIAGNOSIS — F41.9 ANXIETY: ICD-10-CM

## 2018-10-13 NOTE — TELEPHONE ENCOUNTER
"Last Written Prescription Date:  9/21/18  Last Fill Quantity: 90 tablet,  # refills: 1   Last office visit: 9/21/2018 with prescribing provider:  Marco   Future Office Visit:      Requested Prescriptions   Pending Prescriptions Disp Refills     escitalopram (LEXAPRO) 10 MG tablet [Pharmacy Med Name: ESCITALOPRAM 10 MG TABLET] 135 tablet 0     Sig: TAKE 1.5 TABLETS (15 MG) BY MOUTH DAILY IN THE EVENING    SSRIs Protocol Passed    10/13/2018  1:57 AM       Passed - PHQ-9 score less than 5 in past 6 months    Please review last PHQ-9 score.          Passed - Patient is age 18 or older       Passed - No active pregnancy on record       Passed - No positive pregnancy test in last 12 months       Passed - Recent (6 mo) or future (30 days) visit within the authorizing provider's specialty    Patient had office visit in the last 6 months or has a visit in the next 30 days with authorizing provider or within the authorizing provider's specialty.  See \"Patient Info\" tab in inbasket, or \"Choose Columns\" in Meds & Orders section of the refill encounter.            PHQ-9 SCORE 5/1/2017 12/15/2017 9/21/2018   Total Score - - -   Total Score 3 10 4     LUCERO-7 SCORE 5/1/2017 12/15/2017 9/21/2018   Total Score - - -   Total Score 3 9 4         "

## 2018-10-15 RX ORDER — ESCITALOPRAM OXALATE 10 MG/1
TABLET ORAL
Qty: 135 TABLET | Refills: 1 | Status: SHIPPED | OUTPATIENT
Start: 2018-10-15 | End: 2019-03-11

## 2019-03-11 ENCOUNTER — OFFICE VISIT (OUTPATIENT)
Dept: FAMILY MEDICINE | Facility: CLINIC | Age: 34
End: 2019-03-11
Payer: COMMERCIAL

## 2019-03-11 VITALS
HEIGHT: 66 IN | HEART RATE: 66 BPM | BODY MASS INDEX: 34.39 KG/M2 | SYSTOLIC BLOOD PRESSURE: 114 MMHG | TEMPERATURE: 97.4 F | OXYGEN SATURATION: 96 % | WEIGHT: 214 LBS | DIASTOLIC BLOOD PRESSURE: 84 MMHG

## 2019-03-11 DIAGNOSIS — H10.9 CONJUNCTIVITIS OF LEFT EYE, UNSPECIFIED CONJUNCTIVITIS TYPE: Primary | ICD-10-CM

## 2019-03-11 DIAGNOSIS — F41.8 PERFORMANCE ANXIETY: ICD-10-CM

## 2019-03-11 PROCEDURE — 99213 OFFICE O/P EST LOW 20 MIN: CPT | Performed by: NURSE PRACTITIONER

## 2019-03-11 RX ORDER — PROPRANOLOL HYDROCHLORIDE 20 MG/1
20 TABLET ORAL DAILY PRN
Qty: 30 TABLET | Refills: 1 | Status: SHIPPED | OUTPATIENT
Start: 2019-03-11 | End: 2019-04-05

## 2019-03-11 ASSESSMENT — PATIENT HEALTH QUESTIONNAIRE - PHQ9: SUM OF ALL RESPONSES TO PHQ QUESTIONS 1-9: 2

## 2019-03-11 ASSESSMENT — MIFFLIN-ST. JEOR: SCORE: 1697.45

## 2019-03-11 NOTE — PROGRESS NOTES
SUBJECTIVE:   Abi Dawkins is a 34 year old female who presents to clinic today for the following health issues:      Eye(s) Problem  Onset: 03/07/2019- was treated by [facility] for conjunctivitis and haS been using the antibiotic eyedrops for 5 days.  Her left eye is better, not red anymore or crusty  but the lower lid margin and the inner canthus of the upper lid are still itchy.  Wondering if she can use aquafor in those places for itching   Description:   Location: left  Pain: no   Redness: no     Accompanying Signs & Symptoms:  Discharge/mattering: resolved  Swelling: no   Visual changes: no   Fever: no   Nasal Congestion: no   Bothered by bright lights: no     History:   Trauma: no   Foreign body exposure: no     Precipitating factors:   Wearing contacts: no     Alleviating factors:  Improved by: warm compress    Therapies Tried and outcome: none      Problem list and histories reviewed & adjusted, as indicated.  Additional history: as documented    Patient Active Problem List   Diagnosis     Anxiety     Migraines     Open wound s/po sut removal 7-31-15 and inital suturing 7-19-15      Dislocation of toe of left foot and contusion at 2nd MT joint DOI 7-19-15     Major depressive disorder, recurrent episode, mild (H)     History of gestational diabetes     Obesity due to excess calories without serious comorbidity, unspecified classification     Past Surgical History:   Procedure Laterality Date     ENT SURGERY  2011    typanamastoidectomy     ENT SURGERY Left [id number]    10 T-tube placements     LASIK CUSTOMVUE BILATERAL Bilateral 5/11/2015    Procedure: LASIK CUSTOMVUE BILATERAL;  Surgeon: Maycol Bella MD;  Location: [facility]     WAVESCAN SCREENING Bilateral 5/11/2015    Procedure: WAVESCAN SCREENING;  Surgeon: Maycol Bella MD;  Location: [facility]       Social History     Tobacco Use     Smoking status: Never Smoker     Smokeless tobacco: Never Used   Substance Use Topics     Alcohol use: Yes      "Alcohol/week: 0.0 oz     Comment: one drink     Family History   Problem Relation Age of Onset     Diabetes Father      Hypertension Father      Allergies Father      High cholesterol Father      Asthma Brother      Hypertension Brother      Anxiety Disorder Brother      Breast Cancer No family hx of      Colon Cancer No family hx of          Current Outpatient Medications   Medication Sig Dispense Refill     escitalopram (LEXAPRO) 20 MG tablet Take 1 tablet (20 mg) by mouth daily 90 tablet 1     propranolol (INDERAL) 20 MG tablet Take 1 tablet (20 mg) by mouth daily as needed 30 tablet 1     rizatriptan (MAXALT-MLT) 5 MG ODT tab Take 1 tablet (5 mg) by mouth at onset of headache for migraine 9 tablet 3     Allergies   Allergen Reactions     Cats      Runny nose, redness under eyes, sniffling       Reviewed and updated as needed this visit by clinical staff  Tobacco  Allergies  Meds  Problems  Med Hx  Surg Hx  Fam Hx       Reviewed and updated as needed this visit by Provider         ROS:  Constitutional, HEENT, cardiovascular, pulmonary, gi and gu systems are negative, except as otherwise noted.    OBJECTIVE:     /84 (BP Location: Right arm, Patient Position: Sitting, Cuff Size: Adult Large)   Pulse 66   Temp 97.4  F (36.3  C) (Oral)   Ht 1.676 m (5' 6\")   Wt 97.1 kg (214 lb)   SpO2 96%   BMI 34.54 kg/m    Body mass index is 34.54 kg/m .  GENERAL: healthy, alert and no distress  EYES: Eyes grossly normal to inspection, PERRL and conjunctivae and sclerae normal, no rash,scaliness, redness or  dryness of the eyelids, no inflammation of the lid margin or eyelash follicles, no exudate     Diagnostic Test Results:  none     ASSESSMENT/PLAN:       ICD-10-CM    1. Conjunctivitis of left eye, unspecified conjunctivitis type H10.9    2. Performance anxiety F41.8 propranolol (INDERAL) 20 MG tablet   advised to continue the eyedrops for a full 10 day course  She was not seen for the diagnosis and this may " not have been a bacterial conjunctivitis bu thshould  Complete therapy  The eyedrop itself may be causing some irritiation , advised to keep eyelids dry and use aquqfor as moisture barrier while using the eyedrop        MORRIS Clemons CNP  [facility]

## 2019-03-14 ENCOUNTER — OFFICE VISIT (OUTPATIENT)
Dept: FAMILY MEDICINE | Facility: CLINIC | Age: 34
End: 2019-03-14
Payer: COMMERCIAL

## 2019-03-14 VITALS
HEART RATE: 80 BPM | SYSTOLIC BLOOD PRESSURE: 102 MMHG | DIASTOLIC BLOOD PRESSURE: 68 MMHG | HEIGHT: 66 IN | RESPIRATION RATE: 14 BRPM | WEIGHT: 214 LBS | TEMPERATURE: 98.1 F | BODY MASS INDEX: 34.39 KG/M2

## 2019-03-14 DIAGNOSIS — H01.9 INFECTED EYE LID: ICD-10-CM

## 2019-03-14 PROCEDURE — 99213 OFFICE O/P EST LOW 20 MIN: CPT | Performed by: INTERNAL MEDICINE

## 2019-03-14 RX ORDER — POLYMYXIN B SULFATE AND TRIMETHOPRIM 1; 10000 MG/ML; [USP'U]/ML
1-2 SOLUTION OPHTHALMIC EVERY 4 HOURS
COMMUNITY
End: 2019-10-01

## 2019-03-14 ASSESSMENT — MIFFLIN-ST. JEOR: SCORE: 1697.45

## 2019-03-14 NOTE — PROGRESS NOTES
"  SUBJECTIVE:   Abi Dawkins is a 34 year old female who presents to clinic today for the following health issues:    Follow up on pinkeye symptoms. Going on x 8 days. Getting worse, using polytrim    Patient has done warm compresses  Associated with occasional blurriness  Wakes up with crusty eye, irritation, and itching  She saw Maya Serrano on 03/11/19  She was given eye drops, which she has been using    Problem list and histories reviewed & adjusted, as indicated.  Additional history: as documented    Medications and labs reviewed in EPIC    Reviewed and updated as needed this visit by clinical staff  Tobacco  Allergies  Meds       Reviewed and updated as needed this visit by Provider       ROS:  Constitutional, HEENT, cardiovascular, pulmonary, GI, , musculoskeletal, neuro, skin, endocrine and psych systems are negative, except as otherwise noted.    POSITIVE for left eye infection    This document serves as a record of the services and decisions personally performed and made by Candace Lara MD. It was created on her behalf by Marce Peck, a trained medical scribe. The creation of this document is based on the provider's statements to the medical scribe.  Marce Peck 4:15 PM March 14, 2019    OBJECTIVE:     /68   Pulse 80   Temp 98.1  F (36.7  C)   Resp 14   Ht 1.676 m (5' 6\")   Wt 97.1 kg (214 lb)   LMP 02/28/2019   Breastfeeding? No   BMI 34.54 kg/m    Body mass index is 34.54 kg/m .    GENERAL APPEARANCE: obese, alert and no distress  EYES: no photophobia, redness on upper and lower left eyelids PERRL and conjunctivae and sclerae normal  Appears like the redness from the left lower lid is spreading slightly lower part of the face  Lower eyelid is slightly swollen and red  PSYCH: mentation appears normal, affect normal/bright    Diagnostic Test Results:  none     Reviewed and discussed pap smear done on 05/19/15  ASSESSMENT/PLAN:     Abi was seen today for eye " problem.    Diagnoses and all orders for this visit:    Infected eye lid along with slight cellulitis below the eyelid  Comments:  left lower  Orders:  -     amoxicillin-clavulanate (AUGMENTIN) 875-125 MG tablet; Take 1 tablet by mouth 2 times daily for 7 days  -     OPHTHALMOLOGY ADULT REFERRAL  Patient reports left eye redness for the past 8 days  Symptoms are worsening  Endorses that she wakes up with crust, irritation, and itching  She does warm compresses, which help minimally  Patient was seen by Maya Serrano on 03/11/19  Has been using polytrim, but her symptoms are worsening  Upon examination, sclera and conjunctiva were normal  However, upper and lower eyelids appeared infected  Prescribed Augmentin  Advised patient to stop using polytrim  Advised continued warm compresses  Explained that if symptoms worsen, she should go to opthalomology    Patient Instructions   Take Augmentin twice daily for 7 days  Continue warm compresses  Schedule an appointment for ophthalmology  If your symptoms worsen, keep appointment for ophthalmology by early next week  Schedule an appointment for pap smear at your earliest convenience  Schedule an appointment for physical at your earliest convenience  Seek sooner medical attention if there is any worsening of symptoms or problems    The information in this document, created by the medical scribe for me, accurately reflects the services I personally performed and the decisions made by me. I have reviewed and approved this document for accuracy prior to leaving the patient care area.  March 14, 2019 4:35 PM    Candace Lara MD  [facility]

## 2019-03-14 NOTE — PATIENT INSTRUCTIONS
Take Augmentin twice daily for 7 days  Continue warm compresses  Schedule an appointment for ophthalmology  If your symptoms worsen, keep appointment for ophthalmology by early next week  Schedule an appointment for pap smear at your earliest convenience  Schedule an appointment for physical at your earliest convenience  Seek sooner medical attention if there is any worsening of symptoms or problems

## 2019-04-05 DIAGNOSIS — F41.8 PERFORMANCE ANXIETY: ICD-10-CM

## 2019-04-05 NOTE — TELEPHONE ENCOUNTER
"Pending Prescriptions:                       Disp   Refills    propranolol (INDERAL) 20 MG tablet [Pharm*30 tab*0            Sig: TAKE 1 TABLET (20 MG) BY MOUTH DAILY AS NEEDED    Last Written Prescription Date:  3/11/19  Last Fill Quantity: 30,  # refills: 1   Last office visit: 3/14/2019 with prescribing provider:     Future Office Visit:    Requested Prescriptions   Pending Prescriptions Disp Refills     propranolol (INDERAL) 20 MG tablet [Pharmacy Med Name: PROPRANOLOL 20 MG TABLET] 30 tablet 0     Sig: TAKE 1 TABLET (20 MG) BY MOUTH DAILY AS NEEDED    Beta-Blockers Protocol Passed - 4/5/2019  7:35 AM       Passed - Blood pressure under 140/90 in past 12 months    BP Readings from Last 3 Encounters:   03/14/19 102/68   03/11/19 114/84   09/21/18 118/79                Passed - Patient is age 6 or older       Passed - Recent (12 mo) or future (30 days) visit within the authorizing provider's specialty    Patient had office visit in the last 12 months or has a visit in the next 30 days with authorizing provider or within the authorizing provider's specialty.  See \"Patient Info\" tab in inbasket, or \"Choose Columns\" in Meds & Orders section of the refill encounter.             Passed - Medication is active on med list          "

## 2019-04-08 RX ORDER — PROPRANOLOL HYDROCHLORIDE 20 MG/1
20 TABLET ORAL DAILY PRN
Qty: 30 TABLET | Refills: 0 | Status: SHIPPED | OUTPATIENT
Start: 2019-04-08 | End: 2019-05-03

## 2019-05-03 DIAGNOSIS — F41.8 PERFORMANCE ANXIETY: ICD-10-CM

## 2019-05-03 NOTE — TELEPHONE ENCOUNTER
"propranolol (INDERAL) 20 MG tablet    Last Written Prescription Date:  04/08/2019  Last Fill Quantity: 30,  # refills: 0   Last office visit: 3/14/2019 with prescribing provider:  Marco   Future Office Visit:  Unknown     Requested Prescriptions   Pending Prescriptions Disp Refills     propranolol (INDERAL) 20 MG tablet [Pharmacy Med Name: PROPRANOLOL 20 MG TABLET] 30 tablet 0     Sig: TAKE 1 TABLET (20 MG) BY MOUTH DAILY AS NEEDED       Beta-Blockers Protocol Passed - 5/3/2019  9:30 AM        Passed - Blood pressure under 140/90 in past 12 months     BP Readings from Last 3 Encounters:   03/14/19 102/68   03/11/19 114/84   09/21/18 118/79                 Passed - Patient is age 6 or older        Passed - Recent (12 mo) or future (30 days) visit within the authorizing provider's specialty     Patient had office visit in the last 12 months or has a visit in the next 30 days with authorizing provider or within the authorizing provider's specialty.  See \"Patient Info\" tab in inbasket, or \"Choose Columns\" in Meds & Orders section of the refill encounter.              Passed - Medication is active on med list          "

## 2019-05-06 RX ORDER — PROPRANOLOL HYDROCHLORIDE 20 MG/1
20 TABLET ORAL DAILY PRN
Qty: 30 TABLET | Refills: 0 | Status: SHIPPED | OUTPATIENT
Start: 2019-05-06 | End: 2022-07-22

## 2019-07-29 DIAGNOSIS — F33.0 MAJOR DEPRESSIVE DISORDER, RECURRENT EPISODE, MILD (H): ICD-10-CM

## 2019-07-29 DIAGNOSIS — F41.9 ANXIETY: ICD-10-CM

## 2019-07-29 NOTE — TELEPHONE ENCOUNTER
"escitalopram (LEXAPRO) 20 MG tablet 90 tablet 1 9/21/2018     Last Written Prescription Date:  9/21/2018  Last Fill Quantity: 90,  # refills: 1   Last office visit: 3/14/2019 with prescribing provider: NERY Lara    Future Office Visit:   Next 5 appointments (look out 90 days)    Aug 13, 2019 11:40 AM CDT  [facility] [address] [place] [id number] [phone]         Requested Prescriptions   Pending Prescriptions Disp Refills     escitalopram (LEXAPRO) 10 MG tablet [Pharmacy Med Name: ESCITALOPRAM 10 MG TABLET] 135 tablet 1     Sig: TAKE 1.5 TABLETS (15 MG) BY MOUTH DAILY IN THE EVENING       SSRIs Protocol Passed - 7/29/2019  1:48 AM        Passed - PHQ-9 score less than 5 in past 6 months     Please review last PHQ-9 score.           Passed - Medication is active on med list        Passed - Patient is age 18 or older        Passed - No active pregnancy on record        Passed - No positive pregnancy test in last 12 months        Passed - Recent (6 mo) or future (30 days) visit within the authorizing provider's specialty     Patient had office visit in the last 6 months or has a visit in the next 30 days with authorizing provider or within the authorizing provider's specialty.  See \"Patient Info\" tab in inbasket, or \"Choose Columns\" in Meds & Orders section of the refill encounter.              "

## 2019-07-30 RX ORDER — ESCITALOPRAM OXALATE 10 MG/1
TABLET ORAL
Qty: 135 TABLET | Refills: 1 | Status: SHIPPED | OUTPATIENT
Start: 2019-07-30 | End: 2019-10-01 | Stop reason: DRUGHIGH

## 2019-07-30 NOTE — TELEPHONE ENCOUNTER
Prescription approved per [facility] Refill Protocol.    Keo KUHN RN

## 2019-09-26 NOTE — PROGRESS NOTES
Abi is a 34 year old  female who presents for annual exam.     Besides routine health maintenance, she would like to discuss significant weight gain over the last year .    HPI:  Here today for yearly exam --has not been to see me since her postpartum visit in 2016!!  Regular, monthly menses x 5d.  Usually 2-3d with heavier bleeding and some cramping.  No intermenstrual bleeding/spotting.  +SA --no issues.  Using condoms for contraception.  No bowel issues.  Some FRANCISCO JAVIER with playing, running, jumping, etc.  No nighttime issues    ; changed jobs in March --now working as  for [employer] --currently working with [employer]; son, Kael will be 2yo next week!!  -staying active but no formal exercise; tough with job and little one at home  PCP -[facility] --had normal fasting bloodwork last year  -agrees to flu shot today  +SBE --no concerns; no personal or family hx breast disease      GYNECOLOGIC HISTORY:    Patient's last menstrual period was 2019 (approximate).  Her current contraception method is: condoms.  She  reports that she has never smoked. She has never used smokeless tobacco.    Patient is sexually active.  STD testing offered?  Declined  Last PHQ-9 score on record =   PHQ-9 SCORE 10/1/2019   PHQ-9 Total Score -   PHQ-9 Total Score 2     Last GAD7 score on record =   LUCERO-7 SCORE 10/1/2019   Total Score -   Total Score 2     Alcohol Score = 8    HEALTH MAINTENANCE:  Cholesterol:   Cholesterol   Date Value Ref Range Status   03/15/2012 220 (H) 0 - 200 mg/dL Final     Comment:     LDL Cholesterol is the primary guide to therapy.   The NCEP recommends further evaluation of: patients with cholesterol greater   than 200 mg/dL if additional risk factors are present, cholesterol greater   than   240 mg/dL, triglycerides greater than 150 mg/dL, or HDL less than 40 mg/dL.      Last Mammo: NA, Result: not applicable, Next Mammo: NA   Pap: (  Lab Results   Component Value Date     PAP NIL 2015    5/19/15 WNL   Colonoscopy:  NA, Result: not applicable, Next Colonoscopy: NA years.  Dexa:  NA    Health maintenance updated:  yes    HISTORY:  OB History    Para Term  AB Living   1 1 1 0 0 1   SAB TAB Ectopic Multiple Live Births   0 0 0 0 1      # Outcome Date GA Lbr Benedict/2nd Weight Sex Delivery Anes PTL Lv   1 Term 10/03/16 40w5d 07:00 / 03:17 3.55 kg (7 lb 13.2 oz) M Vag-Spont EPI N JUJU      Name: Kael      Apgar1: 8  Apgar5: 9       Patient Active Problem List   Diagnosis     Anxiety     Migraines     Open wound s/po sut removal 7-31-15 and inital suturing 7-19-15      Dislocation of toe of left foot and contusion at 2nd MT joint DOI 7-19-15     Major depressive disorder, recurrent episode, mild (H)     History of gestational diabetes     Obesity due to excess calories without serious comorbidity, unspecified classification     Infected eye lid     Past Surgical History:   Procedure Laterality Date     ENT SURGERY      typanamastoidectomy     ENT SURGERY Left [id number]    10 T-tube placements     LASIK CUSTOMVUE BILATERAL Bilateral 2015    Procedure: LASIK CUSTOMVUE BILATERAL;  Surgeon: Maycol Bella MD;  Location: [facility]     WAVESCAN SCREENING Bilateral 2015    Procedure: WAVESCAN SCREENING;  Surgeon: Maycol Bella MD;  Location: [facility]      Social History     Tobacco Use     Smoking status: Never Smoker     Smokeless tobacco: Never Used   Substance Use Topics     Alcohol use: Yes     Alcohol/week: 0.0 standard drinks     Comment: one drink      Problem (# of Occurrences) Relation (Name,Age of Onset)    Allergies (1) Father    Anxiety Disorder (1) Brother    Asthma (1) Brother    Diabetes (1) Father    High cholesterol (1) Father    Hypertension (2) Father, Brother       Negative family history of: Breast Cancer, Colon Cancer            Current Outpatient Medications   Medication Sig     escitalopram (LEXAPRO) 20 MG tablet Take 1 tablet (20 mg) by  "mouth daily     propranolol (INDERAL) 20 MG tablet TAKE 1 TABLET (20 MG) BY MOUTH DAILY AS NEEDED     rizatriptan (MAXALT-MLT) 5 MG ODT tab Take 1 tablet (5 mg) by mouth at onset of headache for migraine     No current facility-administered medications for this visit.      Allergies   Allergen Reactions     Cats      Runny nose, redness under eyes, sniffling       Past medical, surgical, social and family histories were reviewed and updated in EPIC.    ROS:   12 point review of systems negative other than symptoms noted below.  Constitutional: Weight Gain    EXAM:  /78   Ht 1.676 m (5' 6\")   Wt 98.9 kg (218 lb)   LMP 09/17/2019 (Approximate)   Breastfeeding? No   BMI 35.19 kg/m     BMI: Body mass index is 35.19 kg/m .    PHYSICAL EXAM:  Constitutional:  Appearance: Well nourished, well developed, alert, in no acute distress  Neck:  Lymph Nodes:  No lymphadenopathy present    Thyroid:  Gland size normal, nontender, no nodules or masses present  on palpation  Chest:  Respiratory Effort:  Breathing unlabored  Cardiovascular:    Heart: Auscultation:  Regular rate, normal rhythm, no murmurs present  Breasts: Inspection of Breasts:  No lymphadenopathy present., Palpation of Breasts and Axillae:  No masses present on palpation, no breast tenderness., Axillary Lymph Nodes:  No lymphadenopathy present. and No nodularity, asymmetry or nipple discharge bilaterally.  Gastrointestinal:   Abdominal Examination:  Abdomen nontender to palpation, tone normal without rigidity or guarding, no masses present, umbilicus without lesions   Liver and Spleen:  No hepatomegaly present, liver nontender to palpation    Hernias:  No hernias present  Lymphatic: Lymph Nodes:  No other lymphadenopathy present  Skin:  General Inspection:  No rashes present, no lesions present, no areas of  discoloration    Genitalia and Groin:  No rashes present, no lesions present, no areas of  discoloration, no masses " present  Neurologic/Psychiatric:    Mental Status:  Oriented X3     Pelvic Exam:  External Genitalia:     Normal appearance for age, no discharge present, no tenderness present, no inflammatory lesions present, color normal  Vagina:     Normal vaginal vault without central or paravaginal defects, no discharge present, no inflammatory lesions present, no masses present  Bladder:     Nontender to palpation  Urethra:   Urethral Body:  Urethra palpation normal, urethra structural support normal   Urethral Meatus:  No erythema or lesions present  Cervix:     Appearance healthy, no lesions present, nontender to palpation, no bleeding present  Uterus:     Uterus: firm, normal sized and nontender, anteverted in position.   Adnexa:     No adnexal tenderness present, no adnexal masses present  Perineum:     Perineum within normal limits, no evidence of trauma, no rashes or skin lesions present  Anus:     Anus within normal limits, no hemorrhoids present  Inguinal Lymph Nodes:     No lymphadenopathy present  Pubic Hair:     Normal pubic hair distribution for age  Genitalia and Groin:     No rashes present, no lesions present, no areas of discoloration, no masses present      COUNSELING:   Reviewed preventive health counseling, as reflected in patient instructions  Special attention given to:        Regular exercise       Healthy diet/nutrition       Family planning       Folic Acid Counseling    BMI: Body mass index is 35.19 kg/m .  Weight management plan: Discussed healthy diet and exercise guidelines Patient was referred to their PCP to discuss a diet and exercise plan.    ASSESSMENT:  34 year old female with satisfactory annual exam.    ICD-10-CM    1. Encounter for gynecological examination without abnormal finding Z01.419 Pap imaged thin layer screen with HPV - recommended age 30 - 65     HPV High Risk Types DNA Cervical   2. Need for influenza vaccination Z23 ADMIN 1st VACCINE     FLU VAC PRESRV FREE QUAD SPLIT VIR 3+YRS  IM       PLAN:  Patient Instructions   Follow up with your primary care provider for your other medical problems.  Continue self breast exam.  Increase physical activity and exercise.  Lab and pap smear results will be called to the patient.  Co-testing done today and will repeat in 5yrs if negative.  Usual safety and preventative measures counseling done.  BMI >25  Weight loss encouraged.  Long discussion today about weight gain; discussed importance of both diet and exercise.  Discussed healthy proteins, increase in fruits and vegatables, limiting empty or liquid calories, etc.  Discussed recommendations for exercise --trying to get 30-60min of moderate intensity exercise at least 4-5d/wk.  Discussed both cardio as well as strength training and core work.  Discussed little things to incorporate into her day to day activities --walking over lunch hour, using stairs, parking further away, more outside playing with her son, etc.  Flu Shot today.      Zonia Hunter MD

## 2019-10-01 ENCOUNTER — OFFICE VISIT (OUTPATIENT)
Dept: OBGYN | Facility: CLINIC | Age: 34
End: 2019-10-01
Payer: COMMERCIAL

## 2019-10-01 VITALS
SYSTOLIC BLOOD PRESSURE: 120 MMHG | BODY MASS INDEX: 35.03 KG/M2 | DIASTOLIC BLOOD PRESSURE: 78 MMHG | WEIGHT: 218 LBS | HEIGHT: 66 IN

## 2019-10-01 DIAGNOSIS — Z23 NEED FOR INFLUENZA VACCINATION: ICD-10-CM

## 2019-10-01 DIAGNOSIS — Z01.419 ENCOUNTER FOR GYNECOLOGICAL EXAMINATION WITHOUT ABNORMAL FINDING: Primary | ICD-10-CM

## 2019-10-01 PROCEDURE — 87624 HPV HI-RISK TYP POOLED RSLT: CPT | Performed by: OBSTETRICS & GYNECOLOGY

## 2019-10-01 PROCEDURE — 99395 PREV VISIT EST AGE 18-39: CPT | Mod: 25 | Performed by: OBSTETRICS & GYNECOLOGY

## 2019-10-01 PROCEDURE — 90471 IMMUNIZATION ADMIN: CPT | Performed by: OBSTETRICS & GYNECOLOGY

## 2019-10-01 PROCEDURE — 90686 IIV4 VACC NO PRSV 0.5 ML IM: CPT | Performed by: OBSTETRICS & GYNECOLOGY

## 2019-10-01 PROCEDURE — G0145 SCR C/V CYTO,THINLAYER,RESCR: HCPCS | Performed by: OBSTETRICS & GYNECOLOGY

## 2019-10-01 ASSESSMENT — MIFFLIN-ST. JEOR: SCORE: 1715.59

## 2019-10-01 ASSESSMENT — ANXIETY QUESTIONNAIRES
2. NOT BEING ABLE TO STOP OR CONTROL WORRYING: NOT AT ALL
7. FEELING AFRAID AS IF SOMETHING AWFUL MIGHT HAPPEN: NOT AT ALL
GAD7 TOTAL SCORE: 2
1. FEELING NERVOUS, ANXIOUS, OR ON EDGE: SEVERAL DAYS
6. BECOMING EASILY ANNOYED OR IRRITABLE: SEVERAL DAYS
3. WORRYING TOO MUCH ABOUT DIFFERENT THINGS: NOT AT ALL
5. BEING SO RESTLESS THAT IT IS HARD TO SIT STILL: NOT AT ALL
IF YOU CHECKED OFF ANY PROBLEMS ON THIS QUESTIONNAIRE, HOW DIFFICULT HAVE THESE PROBLEMS MADE IT FOR YOU TO DO YOUR WORK, TAKE CARE OF THINGS AT HOME, OR GET ALONG WITH OTHER PEOPLE: NOT DIFFICULT AT ALL

## 2019-10-01 ASSESSMENT — PATIENT HEALTH QUESTIONNAIRE - PHQ9
5. POOR APPETITE OR OVEREATING: NOT AT ALL
SUM OF ALL RESPONSES TO PHQ QUESTIONS 1-9: 2

## 2019-10-01 NOTE — PATIENT INSTRUCTIONS
Follow up with your primary care provider for your other medical problems.  Continue self breast exam.  Increase physical activity and exercise.  Lab and pap smear results will be called to the patient.  Co-testing done today and will repeat in 5yrs if negative.  Usual safety and preventative measures counseling done.  BMI >25  Weight loss encouraged.  Long discussion today about weight gain; discussed importance of both diet and exercise.  Discussed healthy proteins, increase in fruits and vegatables, limiting empty or liquid calories, etc.  Discussed recommendations for exercise --trying to get 30-60min of moderate intensity exercise at least 4-5d/wk.  Discussed both cardio as well as strength training and core work.  Discussed little things to incorporate into her day to day activities --walking over lunch hour, using stairs, parking further away, more outside playing with her son, etc.  Flu Shot today.

## 2019-10-02 ASSESSMENT — ANXIETY QUESTIONNAIRES: GAD7 TOTAL SCORE: 2

## 2019-10-04 LAB
COPATH REPORT: NORMAL
PAP: NORMAL

## 2019-10-09 LAB
FINAL DIAGNOSIS: NORMAL
HPV HR 12 DNA CVX QL NAA+PROBE: NEGATIVE
HPV16 DNA SPEC QL NAA+PROBE: NEGATIVE
HPV18 DNA SPEC QL NAA+PROBE: NEGATIVE
SPECIMEN DESCRIPTION: NORMAL
SPECIMEN SOURCE CVX/VAG CYTO: NORMAL

## 2019-11-17 ENCOUNTER — MYC REFILL (OUTPATIENT)
Dept: FAMILY MEDICINE | Facility: CLINIC | Age: 34
End: 2019-11-17

## 2019-11-17 DIAGNOSIS — F33.0 MAJOR DEPRESSIVE DISORDER, RECURRENT EPISODE, MILD (H): ICD-10-CM

## 2019-11-17 DIAGNOSIS — F41.9 ANXIETY: ICD-10-CM

## 2019-11-19 RX ORDER — ESCITALOPRAM OXALATE 20 MG/1
20 TABLET ORAL DAILY
Qty: 30 TABLET | Refills: 0 | Status: SHIPPED | OUTPATIENT
Start: 2019-11-19 | End: 2019-12-06

## 2019-11-19 NOTE — TELEPHONE ENCOUNTER
Medication is being filled for 1 time refill only due to:  Patient needs to be seen because patient due for depression follow up.

## 2019-11-19 NOTE — TELEPHONE ENCOUNTER
"Requested Prescriptions   Pending Prescriptions Disp Refills     escitalopram (LEXAPRO) 20 MG tablet 90 tablet 1     Sig: Take 1 tablet (20 mg) by mouth daily       SSRIs Protocol Failed - 11/17/2019  3:09 PM        Failed - Recent (6 mo) or future (30 days) visit within the authorizing provider's specialty     Patient had office visit in the last 6 months or has a visit in the next 30 days with authorizing provider or within the authorizing provider's specialty.  See \"Patient Info\" tab in inbasket, or \"Choose Columns\" in Meds & Orders section of the refill encounter.            Passed - PHQ-9 score less than 5 in past 6 months     Please review last PHQ-9 score.           Passed - Medication is active on med list        Passed - Patient is age 18 or older        Passed - No active pregnancy on record        Passed - No positive pregnancy test in last 12 months        Last Written Prescription Date:  09/21/18  Last Fill Quantity: 90,  # refills: 3   Last office visit: 3/14/2019 with prescribing provider:     Future Office Visit:      Sent patient [employer] message to schedule appointment    Imani Navarrete MA      "

## 2019-12-06 ENCOUNTER — OFFICE VISIT (OUTPATIENT)
Dept: FAMILY MEDICINE | Facility: CLINIC | Age: 34
End: 2019-12-06
Payer: COMMERCIAL

## 2019-12-06 VITALS
BODY MASS INDEX: 33.91 KG/M2 | DIASTOLIC BLOOD PRESSURE: 84 MMHG | OXYGEN SATURATION: 97 % | HEIGHT: 66 IN | SYSTOLIC BLOOD PRESSURE: 114 MMHG | WEIGHT: 211 LBS | HEART RATE: 70 BPM | TEMPERATURE: 98.2 F

## 2019-12-06 DIAGNOSIS — Z13.0 SCREENING FOR DEFICIENCY ANEMIA: ICD-10-CM

## 2019-12-06 DIAGNOSIS — F41.9 ANXIETY: ICD-10-CM

## 2019-12-06 DIAGNOSIS — Z13.29 SCREENING FOR THYROID DISORDER: ICD-10-CM

## 2019-12-06 DIAGNOSIS — E78.5 HYPERLIPIDEMIA, UNSPECIFIED HYPERLIPIDEMIA TYPE: Primary | ICD-10-CM

## 2019-12-06 DIAGNOSIS — Z79.899 MEDICATION MANAGEMENT: ICD-10-CM

## 2019-12-06 DIAGNOSIS — F33.0 MAJOR DEPRESSIVE DISORDER, RECURRENT EPISODE, MILD (H): ICD-10-CM

## 2019-12-06 LAB
CHOLEST SERPL-MCNC: 220 MG/DL
CREAT SERPL-MCNC: 0.94 MG/DL (ref 0.52–1.04)
ERYTHROCYTE [DISTWIDTH] IN BLOOD BY AUTOMATED COUNT: 12 % (ref 10–15)
GFR SERPL CREATININE-BSD FRML MDRD: 80 ML/MIN/{1.73_M2}
HCT VFR BLD AUTO: 38.2 % (ref 35–47)
HDLC SERPL-MCNC: 54 MG/DL
HGB BLD-MCNC: 12.8 G/DL (ref 11.7–15.7)
LDLC SERPL CALC-MCNC: 144 MG/DL
MCH RBC QN AUTO: 31.1 PG (ref 26.5–33)
MCHC RBC AUTO-ENTMCNC: 33.5 G/DL (ref 31.5–36.5)
MCV RBC AUTO: 93 FL (ref 78–100)
NONHDLC SERPL-MCNC: 166 MG/DL
PLATELET # BLD AUTO: 304 10E9/L (ref 150–450)
RBC # BLD AUTO: 4.11 10E12/L (ref 3.8–5.2)
TRIGL SERPL-MCNC: 111 MG/DL
TSH SERPL DL<=0.005 MIU/L-ACNC: 0.95 MU/L (ref 0.4–4)
WBC # BLD AUTO: 6.8 10E9/L (ref 4–11)

## 2019-12-06 PROCEDURE — 85027 COMPLETE CBC AUTOMATED: CPT | Performed by: INTERNAL MEDICINE

## 2019-12-06 PROCEDURE — 80061 LIPID PANEL: CPT | Performed by: INTERNAL MEDICINE

## 2019-12-06 PROCEDURE — 99214 OFFICE O/P EST MOD 30 MIN: CPT | Performed by: INTERNAL MEDICINE

## 2019-12-06 PROCEDURE — 82565 ASSAY OF CREATININE: CPT | Performed by: INTERNAL MEDICINE

## 2019-12-06 PROCEDURE — 84443 ASSAY THYROID STIM HORMONE: CPT | Performed by: INTERNAL MEDICINE

## 2019-12-06 PROCEDURE — 36415 COLL VENOUS BLD VENIPUNCTURE: CPT | Performed by: INTERNAL MEDICINE

## 2019-12-06 RX ORDER — ESCITALOPRAM OXALATE 20 MG/1
20 TABLET ORAL DAILY
Qty: 90 TABLET | Refills: 3 | Status: SHIPPED | OUTPATIENT
Start: 2019-12-06 | End: 2021-01-28

## 2019-12-06 ASSESSMENT — PATIENT HEALTH QUESTIONNAIRE - PHQ9: SUM OF ALL RESPONSES TO PHQ QUESTIONS 1-9: 3

## 2019-12-06 ASSESSMENT — MIFFLIN-ST. JEOR: SCORE: 1683.84

## 2019-12-06 NOTE — PATIENT INSTRUCTIONS
Labs today  I refilled your prescriptions  Follow-up in one year   Seek sooner medical attention if there is any worsening of symptoms or problems

## 2019-12-06 NOTE — PROGRESS NOTES
"Subjective     Abi Dawkins is a 34 year old female who presents to clinic today for the following health issues:    Patient was late to OV today.    HPI     Depression  Patient is here for Depression follow-up   Lexapro is working well for her  Has been taking for couple years  She took a break when she was pregnant  After she gave birth she started it again    Menstrual cycle  Menstrual cycle has been irregular  Periods are heavier than normal  Reports periods are usually regular  Has been keeping track of her cycles  Notes she does not keep perfect record of this  Follows with Dr. Hunter, an OB/GYN  Has not discussed this with them yet  She has plans for pregnancy in 2020    Infected eyelid follow-up   Reports her eyelid took a long time to heal  She was seen by ophthalmology   Was given eye drops for this  Infection has since resolved    History of recurrent ear infections  She has history of recurrent otitis media as a child  S/p multiple T tube placements  Follows with ENT    Medications and Labs reviewed in EPIC    Reviewed and updated as needed this visit by Provider         Review of Systems   ROS COMP: Constitutional, HEENT, cardiovascular, pulmonary, GI, , musculoskeletal, neuro, skin, endocrine and psych systems are negative, except as otherwise noted.    POSITIVE for heavy menses, irregular menstrual cycle    This document serves as a record of the services and decisions personally performed and made by Candace Lara MD. It was created on her behalf by Sliva Sauceda, a trained medical scribe. The creation of this document is based on the provider's statements to the medical scribe.  Silva Sauceda 10:21 AM December 6, 2019        Objective    /84 (BP Location: Right arm, Patient Position: Chair, Cuff Size: Adult Large)   Pulse 70   Temp 98.2  F (36.8  C) (Oral)   Ht 1.676 m (5' 6\")   Wt 95.7 kg (211 lb)   SpO2 97%   Breastfeeding No   BMI 34.06 kg/m    Body mass index is 34.06 kg/m .  Physical Exam "   GENERAL APPEARANCE: obese, alert and no distress  EYES: Eyes grossly normal to inspection, PERRL and conjunctivae and sclerae normal  HENT: ear showed lots of scarring, TM's normal and nose and mouth without ulcers or lesions  NECK: no adenopathy  RESP: lungs clear to auscultation - no rales, rhonchi or wheezes  CV: regular rates and rhythm, normal S1 S2, no S3  PSYCH: mentation appears normal, affect normal/bright    Diagnostic Test Results:  Labs reviewed in Epic  Results for orders placed or performed in visit on 12/06/19 (from the past 24 hour(s))   Creatinine   Result Value Ref Range    Creatinine 0.94 0.52 - 1.04 mg/dL    GFR Estimate 80 >60 mL/min/[1.73_m2]    GFR Estimate If Black >90 >60 mL/min/[1.73_m2]   CBC with platelets   Result Value Ref Range    WBC 6.8 4.0 - 11.0 10e9/L    RBC Count 4.11 3.8 - 5.2 10e12/L    Hemoglobin 12.8 11.7 - 15.7 g/dL    Hematocrit 38.2 35.0 - 47.0 %    MCV 93 78 - 100 fl    MCH 31.1 26.5 - 33.0 pg    MCHC 33.5 31.5 - 36.5 g/dL    RDW 12.0 10.0 - 15.0 %    Platelet Count 304 150 - 450 10e9/L         Reviewed and discussed PAP done on 10/01/2019  Reviewed and discussed TSH done on 09/21/2018  Reviewed and discussed CMP done on 09/21/2018  Reviewed and discussed A1C done on 09/21/2018    Assessment & Plan   Abi was seen today for depression.    Diagnoses and all orders for this visit:    Hyperlipidemia, unspecified hyperlipidemia type  -     Cancel: Lipid panel reflex to direct LDL Fasting  -     Lipid panel reflex to direct LDL Non-fasting    Major depressive disorder, recurrent episode, mild (H)  Stable  Compliant with medication  Lexapro works well for her  Would like to continue current dose  -     escitalopram (LEXAPRO) 20 MG tablet; Take 1 tablet (20 mg) by mouth daily    Anxiety  See above  -     escitalopram (LEXAPRO) 20 MG tablet; Take 1 tablet (20 mg) by mouth daily    Screening for thyroid disorder  -     TSH with free T4 reflex    Medication management  -      "Creatinine    Screening for deficiency anemia  -     CBC with platelets    Advised she discuss menstrual concerns with her OB/GYN    BMI:   Estimated body mass index is 34.06 kg/m  as calculated from the following:    Height as of this encounter: 1.676 m (5' 6\").    Weight as of this encounter: 95.7 kg (211 lb).   Weight management plan: Discussed healthy diet and exercise guidelines    Patient Instructions   Labs today  I refilled your prescriptions  Follow-up in one year   Seek sooner medical attention if there is any worsening of symptoms or problems    The information in this document, created by the medical scribe for me, accurately reflects the services I personally performed and the decisions made by me. I have reviewed and approved this document for accuracy prior to leaving the patient care area.  December 6, 2019 10:33 AM    Candace Lara MD  [facility]          "

## 2019-12-07 NOTE — RESULT ENCOUNTER NOTE
Eleanor Alex,    This is to inform you regarding your test result.    Your total cholesterol is elevated.  HDL which is called good cholesterol is normal.  Your LDL which is called bad cholesterol is elevated.  Eat low cholesterol low fat  diet and do regular physical activity.   TSH which is thyroid hormone is normal.  Creatinine which is kidney function is fine  CBC result which includes white count Hemoglobin and  Platelet Counts is normal.       Sincerely,      Dr.Nasima Marco MD,FACP

## 2020-06-11 ENCOUNTER — TELEPHONE (OUTPATIENT)
Dept: OBGYN | Facility: CLINIC | Age: 35
End: 2020-06-11

## 2020-06-11 NOTE — TELEPHONE ENCOUNTER
Pt has conception questions and would like to do a phone visit or come into clinic to see Dr. Hunter.

## 2020-06-11 NOTE — TELEPHONE ENCOUNTER
Has been TTC since December but really hasn't been timing intercourse.  Only in the last two months has she been using apps and timing. Cycle has started two days late last two cycles.   Last month started using OPK did not ovulate.   Is requesting a visit with Dr. Hunter to discuss family planning.   Anne Goel RN on 6/11/2020 at 3:09 PM

## 2020-06-17 ENCOUNTER — VIRTUAL VISIT (OUTPATIENT)
Dept: OBGYN | Facility: CLINIC | Age: 35
End: 2020-06-17
Payer: COMMERCIAL

## 2020-06-17 DIAGNOSIS — Z31.69 ENCOUNTER FOR PRECONCEPTION CONSULTATION: Primary | ICD-10-CM

## 2020-06-17 PROCEDURE — 99214 OFFICE O/P EST MOD 30 MIN: CPT | Mod: 95 | Performed by: OBSTETRICS & GYNECOLOGY

## 2020-06-17 NOTE — PATIENT INSTRUCTIONS
Long discussion today reviewing recent cycle tracking, cycle monitoring and timing of fertility.  Given Abi's recently longer cycles, I suspect that she may have simply stopped her OPKs too soon this month as opposed to not having ovulated.  Discussed timing of ovulation approximately 14d prior to period which is something we can truly only calculate retrospectively.  Will start doing daily OPKs beginning on day #12 and continuing until positive for the next couple of months.  If still no pregnancy by the end of the year in spite of +OPKs and appropriately timed intercourse, will return for possible testing/evaluation.  Will resume daily prenatal vitamin.

## 2020-06-17 NOTE — PROGRESS NOTES
"Abi Dawkins is a 35 year old female who is being evaluated via a billable telephone visit.      The patient has been notified of following:     \"This telephone visit will be conducted via a call between you and your physician/provider. We have found that certain health care needs can be provided without the need for a physical exam.  This service lets us provide the care you need with a short phone conversation.  If a prescription is necessary we can send it directly to your pharmacy.  If lab work is needed we can place an order for that and you can then stop by our lab to have the test done at a later time.    Telephone visits are billed at different rates depending on your insurance coverage. During this emergency period, for some insurers they may be billed the same as an in-person visit.  Please reach out to your insurance provider with any questions.    If during the course of the call the physician/provider feels a telephone visit is not appropriate, you will not be charged for this service.\"    Patient has given verbal consent for Telephone visit?  Yes    What phone number would you like to be contacted at? [phone]    How would you like to obtain your AVS? MyChart    Phone call duration: 23 minutes    Zonia Hunter MD        SUBJECTIVE:                                                   Abi Dawkins is a 35 year old female who presents to clinic today for the following health issue(s):  Patient presents with:  Consult: family planning, currently TTC since december. Tracking menses on 2 different apps. Now menses are shifting a little. tried OPKs and all negative      HPI:  **PHONE VISIT**  Calling in today to discuss fertility and attempts at trying to conceive.  First began TTC in Dec 2019.  Has been tracking her cycles with 2 different apps and just recently starting using home OPKs.  Has historically had consistent 28d cycles---have been slightly longer in the last month (32d cycle).  " "Usually has 5-6d bleeding --occ cramping.  Not terribly heavy bleeding.  No intermenstrual bleeding/spotting.   Has never paid attention to possible symptoms around the time of potential ovulation.  Recently started home OPKs.  Did daily afternoon test from day #9-16 without positive result.  +SA --usually every day to every other day during \"window\" per her apps.  NO issues for she or her .    No medical changes/issues.  No new medications.  No difficult conceiving with sonKael    LMP: 2020    Patient is sexually active, .  Using none for contraception.    reports that she has never smoked. She has never used smokeless tobacco.    STD testing offered?  Declined    Health maintenance updated:  yes    Today's PHQ-2 Score:   PHQ-2 (  [employer]) 2019   Q1: Little interest or pleasure in doing things 0   Q2: Feeling down, depressed or hopeless 1   PHQ-2 Score 1     Today's PHQ-9 Score:   PHQ-9 SCORE 2019   PHQ-9 Total Score -   PHQ-9 Total Score 3     Today's LUCERO-7 Score:   LUCERO-7 SCORE 10/1/2019   Total Score -   Total Score 2       Problem list and histories reviewed & adjusted, as indicated.  Additional history: as documented.    Patient Active Problem List   Diagnosis     Anxiety     Migraines     Major depressive disorder, recurrent episode, mild (H)     History of gestational diabetes     Obesity due to excess calories without serious comorbidity, unspecified classification     Infected eye lid     Past Surgical History:   Procedure Laterality Date     ENT SURGERY      typanamastoidectomy     ENT SURGERY Left 5628-6945    10 T-tube placements     LASIK CUSTOMVUE BILATERAL Bilateral 2015    Procedure: LASIK CUSTOMVUE BILATERAL;  Surgeon: Maycol Bella MD;  Location: [facility]     WAVESCAN SCREENING Bilateral 2015    Procedure: WAVESCAN SCREENING;  Surgeon: Maycol Bella MD;  Location: [facility]      Social History     Tobacco Use     Smoking status: Never Smoker     " Smokeless tobacco: Never Used   Substance Use Topics     Alcohol use: Yes     Alcohol/week: 0.0 standard drinks     Comment: one drink      Problem (# of Occurrences) Relation (Name,Age of Onset)    Allergies (1) Father    Anxiety Disorder (1) Brother    Asthma (1) Brother    Diabetes (1) Father    High cholesterol (1) Father    Hypertension (2) Father, Brother       Negative family history of: Breast Cancer, Colon Cancer            Current Outpatient Medications   Medication Sig     escitalopram (LEXAPRO) 20 MG tablet Take 1 tablet (20 mg) by mouth daily     propranolol (INDERAL) 20 MG tablet TAKE 1 TABLET (20 MG) BY MOUTH DAILY AS NEEDED     rizatriptan (MAXALT-MLT) 5 MG ODT tab Take 1 tablet (5 mg) by mouth at onset of headache for migraine     No current facility-administered medications for this visit.      Allergies   Allergen Reactions     Cats      Runny nose, redness under eyes, sniffling       ROS:  12 point review of systems negative other than symptoms noted below or in the HPI.  Genitourinary: Irregular Menses  No urinary frequency or dysuria, bladder or kidney problems      OBJECTIVE:     There were no vitals taken for this visit.  There is no height or weight on file to calculate BMI.    Exam:  NO PHYSICAL EXAM DONE WITH PHONE VISIT TODAY; conversed easily and appropriately by phone    In-Clinic Test Results:  No results found for this or any previous visit (from the past 24 hour(s)).    ASSESSMENT/PLAN:                                                        ICD-10-CM    1. Encounter for preconception consultation  Z31.69        Patient Instructions   Long discussion today reviewing recent cycle tracking, cycle monitoring and timing of fertility.  Given Abi's recently longer cycles, I suspect that she may have simply stopped her OPKs too soon this month as opposed to not having ovulated.  Discussed timing of ovulation approximately 14d prior to period which is something we can truly only calculate  retrospectively.  Will start doing daily OPKs beginning on day #12 and continuing until positive for the next couple of months.  If still no pregnancy by the end of the year in spite of +OPKs and appropriately timed intercourse, will return for possible testing/evaluation.  Will resume daily prenatal vitamin.      Zonia Hunter MD  [facility]

## 2020-10-01 ENCOUNTER — TELEPHONE (OUTPATIENT)
Dept: FAMILY MEDICINE | Facility: CLINIC | Age: 35
End: 2020-10-01

## 2020-10-01 ENCOUNTER — VIRTUAL VISIT (OUTPATIENT)
Dept: FAMILY MEDICINE | Facility: CLINIC | Age: 35
End: 2020-10-01
Payer: COMMERCIAL

## 2020-10-01 DIAGNOSIS — Z11.59 SCREENING FOR VIRAL DISEASE: ICD-10-CM

## 2020-10-01 DIAGNOSIS — J06.9 UPPER RESPIRATORY TRACT INFECTION, UNSPECIFIED TYPE: Primary | ICD-10-CM

## 2020-10-01 DIAGNOSIS — J02.9 SORE THROAT: ICD-10-CM

## 2020-10-01 PROCEDURE — 99214 OFFICE O/P EST MOD 30 MIN: CPT | Mod: 95 | Performed by: INTERNAL MEDICINE

## 2020-10-01 RX ORDER — AZITHROMYCIN 250 MG/1
TABLET, FILM COATED ORAL
Qty: 6 TABLET | Refills: 0 | Status: SHIPPED | OUTPATIENT
Start: 2020-10-01 | End: 2020-10-06

## 2020-10-01 NOTE — PATIENT INSTRUCTIONS
Drink plenty of fluids.  Take extra rest.  Warm saline gargles  Take Tylenol as needed for pain  Take Z-Michael  2 azithromycin today and 1 every day for next 4 days  If symptoms does not improve or get worse then seek attention  I told her I will be available in the clinic tomorrow and again on Monday  She can contact nurse a line on the weekend if needed  Schedule COVID-19 test  I have provided you phone number to call and reschedule  Do self quarantine     You ve had no fever--and no medicine that reduces fever--for 3 full days (72 hours). And     Your symptoms are better, such as cough or trouble breathing. And     At least 7 days have passed since your symptoms first started.    I am including some information about COVID-19 prevention and caring for yourself or someone else       Coronavirus Disease 2019 (COVID-19): Caring for Yourself or Others  If you or a household member have symptoms of COVID-19, follow the guidelines below. This will help you manage symptoms and keep the virus from spreading.  If you have symptoms of COVID-19    Stay home and contact your care team. They will tell you what to do.    Don t panic. Keep in mind that other illnesses can cause similar symptoms.    Stay away from work, school, and public places.    Limit physical contact with others in your home. Limit visitors. No kissing.    Clean surfaces you touch with disinfectant.    If you need to cough or sneeze, do it into a tissue. Then, throw the tissue into the trash. If you don't have tissues, cough or sneeze into the bend of your elbow    Don t share food or personal items with people in your household. This includes items like eating and drinking utensils, towels, and bedding.    Wear a cloth face mask around other people. You may need to make your own mask using a bandana, T-shirt, or other cloth. See the CDC s instructions on how to make a mask.    If you need to go to a hospital or clinic, call ahead to let them know. Expect the  care team to wear masks, gowns, gloves, and eye protection. You may be put in a separate room.    Follow all instructions from your care team.  If you ve been diagnosed with COVID-19    Stay home and away from others, including other people in your home. (This is called self-isolation.) Don t leave home unless you need to get medical care. Don't go to work, school, or public places. Don't use buses, taxis, or other public transportation.    Follow all instructions from your care team.    If you need to go to a hospital or clinic, call ahead to let them know. Expect the care team to wear masks, gowns, gloves, and eye protection. You may be put in a separate room.    Wear a face mask to protect others from your germs. If you can t wear a mask, your caregivers should wear one. You may need to make your own mask using a bandana, T-shirt, or other cloth. See the [facility] s instructions on how to make a mask.    Limit contact with pets and other animals.    Don t share food or personal items with people in your household. This includes items like eating and drinking utensils, towels, and bedding.    If you need to cough or sneeze, do it into a tissue. Then, throw the tissue into the trash. If you don't have tissues, cough or sneeze into the bend of your elbow.    Wash your hands often.  Self-care at home  At this time, there is no medicine to prevent or treat COVID-19. Experts are testing different medicines, trying to find one that works.  So far, the only proven treatment is to support your body while it fights the virus.    Get plenty of rest.    Drink extra fluids--at least 6 to 8 glasses of liquid each day. Good choices are water, sports drinks, soft drinks (no caffeine), juice, tea, and soup.    Take over-the-counter (OTC) medicine to help reduce pain and fever. Your care team will tell you which OTC medicine to use.  If you ve been in the hospital for COVID-19, follow your care team s instructions. This may include  changing positions to help your breathing (such as lying on your belly).  Caring for a sick person    Follow all instructions from the care team.    Wash your hands often.    Wear protective clothing as advised.    Make sure the sick person wears a mask. If they can't wear a mask, don't stay in the same room with them. If you must be in the same room, wear a face mask.    Keep track of the sick person s symptoms.    Clean surfaces often with disinfectant. This includes phones, kitchen counters, fridge door handles, bathroom surfaces, and others.    Don t let anyone share household items with the sick person. This includes eating and drinking tools, towels, sheets, and blankets.    Clean fabrics and laundry well.    Keep other people and pets away from the sick person.  When you can stop self-isolation  When you are sick with COVID-19, you should stay away from other people. This is called self-isolation. The rules for ending self-isolation depend on your health in general.  If you are normally healthy  You can stop self-isolation when all 3 of these are true:    You ve had no fever--and no medicine that reduces fever--for 3 full days (72 hours). And     Your symptoms are better, such as cough or trouble breathing. And     At least 7 days have passed since your symptoms first started.  Talk with your care team before you leave home. They may tell you it s okay to leave, or they may give you different advice.  If you have a weak immune system  If you re being treated for cancer, have an immune disorder (such as HIV), or have had a transplant (organ or bone marrow), follow your care team s instructions. You may be able to end self-isolation when all 3 of these are true:    You ve had no fever--and no medicine that reduces fever--for 3 full days (72 hours). And     Your symptoms are better, such as cough or trouble breathing. And     You ve had 2 tests for COVID-19. The tests happened at least 24 hours apart, and both  "show that you don t have the virus. (If no tests are available, your care team may tell you to follow the rules for normally healthy people above.)  When to call your care team  Call your care team right away if a sick person has any of these:    Trouble breathing    Pain or pressure in the chest  If a sick person has any of these, call 911:    Trouble breathing that gets worse    Pain or pressure in the chest that gets worse    Blue tint to lips or face    Fast or irregular heartbeat    Confusion or trouble waking    Fainting or loss of consciousness    Coughing up blood    Sweating  Going home from the hospital  If you have COVID-19 and were recently in the hospital:    Follow the instructions above for self-care and isolation.    Follow the hospital care team s instructions.    Ask questions if anything is unclear to you. Write down answers so you remember them.  Last modified date: 4/24/2020  This information has been modified by your health care provider with permission from the publisher.       [id number] [address], [place] [address]. All rights reserved. This information is not intended as a substitute for professional medical care. Always follow your healthcare professional's instructions. This information has been modified by your health care provider with permission from the publisher.    Coronavirus Disease 2019 (COVID-19): Prevention  The best prevention is to not have contact with the SARS-CoV-2 virus. There is no vaccine yet.  Canceling travel and other outings  Stay informed about COVID-19 in your area. Follow local instructions about being in public. Be aware of events in your community that may be postponed or canceled, such as school and sporting events. You may be advised not to attend public gatherings.   You will be advised to stay at least 6 feet from others as much as possible. This is called \"social distancing.\" You may be advised to stay at home and isolate yourself as " "much as possible if COVID-19 is in your area. You may hear terms such as \"self isolate, \"quarantine,\"  stay at home,   shelter in place,  and  lockdown.   The CDC advises that people should not travel to areas where there are COVID-19 outbreaks right now for any reason that is not urgent. For the most current CDC travel advisories, visit the CDC website at www.cdc.gov/coronavirus/2019-ncov/travelers. Don t go on cruises or do non-essential travel right now.   When you are at home    Wash your hands often. Use soap and clean, running water for at least 20 seconds.    If you don't have access to soap and water, use an alcohol-based hand  often. Make sure it has at least 60% alcohol.    Don't touch your eyes, nose, or mouth unless you have clean hands.    Don t kiss someone who is sick.    If you need to cough or sneeze, do it into a tissue. Then throw the tissue into the trash. If you don't have tissues, cough or sneeze into the bend of your elbow.    When possible, don't touch \"high-touch\" shared surfaces such as doorknobs and handles, cabinet handles, and light switches.    Clean frequently-touched home surfaces often with disinfectant. This includes desk surfaces, printers, phones, kitchen counters, tables, fridge door handle, bathroom surfaces, and any soiled surface. Closely follow disinfectant label instructions. Go to the CDC s detailed cleaning website at www.cdc.gov/coronavirus/2019-ncov/prepare/cleaning-disinfection.html.    Check your home supplies. Consider keeping a 2-week supply of medicines, food, and other needed household items.    Make a plan for childcare, work, and ways to stay in touch with others. Know who will help you if you get sick.    Don't be around people who are sick.    There is no evidence right now that animals spread SARS-CoV-2. But it's always a good idea to wash your hands after touching any animals. Don't touch animals that may be sick.    Don t share eating or drinking " "utensils with sick people.     If you leave home    Stay at least 6 feet away from all people.    When possible, don't touch \"high-touch\" public surfaces such as doorknobs and handles, cabinet handles, and light switches. If you touch these surfaces, try to clean them first with a disinfecting wipe. Or touch them using a tissue or paper towel.    Use an alcohol-based hand  often. Make sure it has at least 60% alcohol.    Don't touch your eyes, nose, or mouth unless you have clean hands.    If you need to cough or sneeze, do it into a tissue. Then throw the tissue into the trash. If you don't have tissues, cough or sneeze into the bend of your elbow.    Avoid public gatherings.    The CDC advises wearing a cloth face mask in public. During a public health emergency, medical face masks may be reserved for healthcare workers. You may need to make a cloth face mask of your own. You can do this using a bandana, T-shirt, or other cloth. The CDC has instructions on how to make a mask.     If you are at a work site    Stay at least 6 feet away from all people.    Don't shake hands with anyone.    Don t have in-person meetings. Meet over phone or video.    Wash your hands often. Use soap and clean, running water for at least 20 seconds.    If you don't have access to soap and water, use an alcohol-based hand  often. Make sure it has at least 60% alcohol.    Don't touch your eyes, nose, or mouth unless you have clean hands.    The CDC advises wearing a cloth face mask in public. During a public health emergency, medical face masks may be reserved for healthcare workers. You may need to make a cloth face mask of your own. You can do this using a bandana, T-shirt, or other cloth. The CDC has instructions on how to make a mask.    When possible, don't touch \"high-touch\" public surfaces such as doorknobs and handles, cabinet handles, and light switches. If you touch these surfaces, clean them first with a " "disinfecting wipe. Or touch them using a tissue or paper towel.    Use office bambi one person at a time.    Consider not having office coffee or tea, or group foods.    Don t have meals in groups.    Clean work surfaces often with disinfectant. This includes desk surfaces, photocopier, printer, phones, kitchen counters, fridge door handle, bathroom surfaces, and others.    Don t touch other people s personal work tools, such as phones, keyboards, pens, and other items.    Don t touch other people s eating or drinking utensils.    If you need to cough or sneeze, do it into a tissue. Then throw the tissue into the trash. If you don't have tissues, cough or sneeze into the bend of your elbow.    If you feel sick in any way, go home and stay home.     If you have been exposed to a person with COVID-19  If you've been exposed within that last 14 days to someone who is suspected of having COVID-19 or has tested positive for it:    Call your healthcare provider and follow all instructions. Your activities and where you go likely will be restricted for up to 2 weeks. Check your community's instructions about activity restrictions. You may be directed to stay home, or \"self-isolate.\"    Take your temperature every morning and evening for at least 14 days. This is to check for fever. Keep a record of the readings.    Watch for symptoms of the virus. Call your provider if you have symptoms. Call your provider first before going to any clinic or hospital. See the CDC's symptom .    Stay home if you are sick for any reason.  When to call your healthcare provider  Call your healthcare provider if think you have COVID-19 symptoms. These can include fever, cough, and trouble breathing. They may also include body aches, sore throat, or diarrhea. Don t go to a healthcare facility before speaking to a healthcare provider.   Last modified date: 4/6/2020  Anthony last reviewed this educational content on 4/1/2020    " [id number] [employer]. [address] [place] [address], [place] [address]. All rights reserved. This information is not intended as a substitute for professional medical care. Always follow your healthcare professional's instructions.

## 2020-10-01 NOTE — TELEPHONE ENCOUNTER
Per review of appointments in [facility], patient has been rescheduled tomorrow at [facility] testing.    Elaine Galicia RN on 10/1/2020 at 12:52 PM

## 2020-10-01 NOTE — TELEPHONE ENCOUNTER
"Received a call from [facility], .  Per RN, Breana ROMERO, their lab does NOT do Covid PCR testing. (they only do pre-procedure/non-symptoms)    She states there is a patient scheduled today; had a video visit with  today.    Called patient and informed that she should NOT go to [facility].  I called the \"scheduling\" no.(provided by patient: [phone]) and was transferred to the 'inside' scheduling line.  After 23 minute hold, spoke to Sheila who was extremely helpful.  She will call patient NOW to get her scheduled at an appropriate testing location.    Elaine Galicia RN on 10/1/2020 at 12:02 PM        "

## 2020-10-01 NOTE — PROGRESS NOTES
"Abi Dawkins is a 35 year old female who is being evaluated via a billable video visit.      The patient has been notified of following:     \"This video visit will be conducted via a call between you and your physician/provider. We have found that certain health care needs can be provided without the need for an in-person physical exam.  This service lets us provide the care you need with a video conversation.  If a prescription is necessary we can send it directly to your pharmacy.  If lab work is needed we can place an order for that and you can then stop by our lab to have the test done at a later time.    Video visits are billed at different rates depending on your insurance coverage.  Please reach out to your insurance provider with any questions.    If during the course of the call the physician/provider feels a video visit is not appropriate, you will not be charged for this service.\"    Patient has given verbal consent for Video visit? Yes  How would you like to obtain your AVS? MyChart  If you are dropped from the video visit, the video invite should be resent to:   Will anyone else be joining your video visit? No    Subjective     Abi Dawkins is a 35 year old female who presents today via video visit for the following health issues:    She is scheduled this as a office visit and we change it to video visit due to her symptoms as COVID-19 pandemic is going on and sometimes person can present with atypical symptoms    HPI     Acute Illness  Acute illness concerns: URI sx's  Onset/Duration: Few weeks - days  Symptoms:  Fever: no  Chills/Sweats: YES  Headache (location?): YES  Sinus Pressure: no  Conjunctivitis:  no  Ear Pain: no  Rhinorrhea: no  Congestion: no  Sore Throat: YES  Cough: YES - since 9/16  Wheeze: no  Decreased Appetite: YES- little bit yesterday  Nausea: no  Vomiting: no  Diarrhea: YES- Just Monday 9/21  Dysuria/Freq.: no  Dysuria or Hematuria: no  Fatigue/Achiness: YES  Sick/Strep " Exposure: no    Symptoms a started on 9/16 with cough  9/21 covid negative at pharmacy  She felt like URI symptoms but felt good for few days and then again her symptoms a started  She had chills and felt achy coughing spells   She feels her neck glands are swollen  She does see some whitish spots on her throat  Secretions are slight yellowish  She has 4-year-old who is also having some symptoms like congestion  She is working from home and lives with her  and 4-year-old  She has history of a strep throat in the past      Therapies tried and outcome: Cough Syrup        Video Start Time: 8:41 AM        Review of Systems   Constitutional, HEENT, cardiovascular, pulmonary, gi and gu systems are negative, except as otherwise noted.      Objective           Vitals:  No vitals were obtained today due to virtual visit.    Physical Exam     GENERAL: Healthy, alert and no distress  EYES: Eyes grossly normal to inspection.  No discharge or erythema, or obvious scleral/conjunctival abnormalities.  RESP: No audible wheeze, cough, or visible cyanosis.  No visible retractions or increased work of breathing.    SKIN: Visible skin clear. No significant rash, abnormal pigmentation or lesions.  NEURO: Cranial nerves grossly intact.  Mentation and speech appropriate for age.  PSYCH: Mentation appears normal, affect normal/bright, judgement and insight intact, normal speech and appearance well-groomed.              Assessment & Plan     Abi was seen today for uri.    Diagnoses and all orders for this visit:    Upper respiratory tract infection, unspecified type  -     azithromycin (ZITHROMAX) 250 MG tablet; Take 2 tablets (500 mg) by mouth daily for 1 day, THEN 1 tablet (250 mg) daily for 4 days.  -     Symptomatic COVID-19 Virus (Coronavirus) by PCR; Future  Most likely she had viral infection in the beginning  Complicated with bronchitis  There is a possibility of strep throat also as she sees whitish spots and she has a  "history of that  Discussed with her about symptomatic treatment  Tylenol as needed for pain  Warm saline gargles  Zithromax will cover strep and bronchitis but  I ordered another coag test even though she had one few days ago which was negative at pharmacy  Discussed with her about doing self quarantine until symptoms improve  Due to pandemic it is important that she takes all the precautions      Sore throat  -     azithromycin (ZITHROMAX) 250 MG tablet; Take 2 tablets (500 mg) by mouth daily for 1 day, THEN 1 tablet (250 mg) daily for 4 days.  -     Symptomatic COVID-19 Virus (Coronavirus) by PCR; Future  Continue symptomatic treatment  If no improvement then seek attention      Screening for viral disease  -     Symptomatic COVID-19 Virus (Coronavirus) by PCR; Future         BMI:   Estimated body mass index is 34.06 kg/m  as calculated from the following:    Height as of 12/6/19: 1.676 m (5' 6\").    Weight as of 12/6/19: 95.7 kg (211 lb).         Disclaimer: This note consists of symbols derived from keyboarding, dictation and/or voice recognition software. As a result, there may be errors in the script that have gone undetected. Please consider this when interpreting information found in this chart.      See Patient Instructions  Patient Instructions     Drink plenty of fluids.  Take extra rest.  Warm saline gargles  Take Tylenol as needed for pain  Take Z-Michael  2 azithromycin today and 1 every day for next 4 days  If symptoms does not improve or get worse then seek attention  I told her I will be available in the clinic tomorrow and again on Monday  She can contact nurse a line on the weekend if needed  Schedule COVID-19 test  I have provided you phone number to call and reschedule      Do self quarantine     You ve had no fever--and no medicine that reduces fever--for 3 full days (72 hours). And     Your symptoms are better, such as cough or trouble breathing. And     At least 7 days have passed since your " symptoms first started.    I am including some information about COVID-19 prevention and caring for yourself or someone else       Coronavirus Disease 2019 (COVID-19): Caring for Yourself or Others  If you or a household member have symptoms of COVID-19, follow the guidelines below. This will help you manage symptoms and keep the virus from spreading.  If you have symptoms of COVID-19    Stay home and contact your care team. They will tell you what to do.    Don t panic. Keep in mind that other illnesses can cause similar symptoms.    Stay away from work, school, and public places.    Limit physical contact with others in your home. Limit visitors. No kissing.    Clean surfaces you touch with disinfectant.    If you need to cough or sneeze, do it into a tissue. Then, throw the tissue into the trash. If you don't have tissues, cough or sneeze into the bend of your elbow    Don t share food or personal items with people in your household. This includes items like eating and drinking utensils, towels, and bedding.    Wear a cloth face mask around other people. You may need to make your own mask using a bandana, T-shirt, or other cloth. See the CDC s instructions on how to make a mask.    If you need to go to a hospital or clinic, call ahead to let them know. Expect the care team to wear masks, gowns, gloves, and eye protection. You may be put in a separate room.    Follow all instructions from your care team.  If you ve been diagnosed with COVID-19    Stay home and away from others, including other people in your home. (This is called self-isolation.) Don t leave home unless you need to get medical care. Don't go to work, school, or public places. Don't use buses, taxis, or other public transportation.    Follow all instructions from your care team.    If you need to go to a hospital or clinic, call ahead to let them know. Expect the care team to wear masks, gowns, gloves, and eye protection. You may be put in a  separate room.    Wear a face mask to protect others from your germs. If you can t wear a mask, your caregivers should wear one. You may need to make your own mask using a bandana, T-shirt, or other cloth. See the CDC s instructions on how to make a mask.    Limit contact with pets and other animals.    Don t share food or personal items with people in your household. This includes items like eating and drinking utensils, towels, and bedding.    If you need to cough or sneeze, do it into a tissue. Then, throw the tissue into the trash. If you don't have tissues, cough or sneeze into the bend of your elbow.    Wash your hands often.  Self-care at home  At this time, there is no medicine to prevent or treat COVID-19. Experts are testing different medicines, trying to find one that works.  So far, the only proven treatment is to support your body while it fights the virus.    Get plenty of rest.    Drink extra fluids--at least 6 to 8 glasses of liquid each day. Good choices are water, sports drinks, soft drinks (no caffeine), juice, tea, and soup.    Take over-the-counter (OTC) medicine to help reduce pain and fever. Your care team will tell you which OTC medicine to use.  If you ve been in the hospital for COVID-19, follow your care team s instructions. This may include changing positions to help your breathing (such as lying on your belly).  Caring for a sick person    Follow all instructions from the care team.    Wash your hands often.    Wear protective clothing as advised.    Make sure the sick person wears a mask. If they can't wear a mask, don't stay in the same room with them. If you must be in the same room, wear a face mask.    Keep track of the sick person s symptoms.    Clean surfaces often with disinfectant. This includes phones, kitchen counters, fridge door handles, bathroom surfaces, and others.    Don t let anyone share household items with the sick person. This includes eating and drinking tools,  towels, sheets, and blankets.    Clean fabrics and laundry well.    Keep other people and pets away from the sick person.  When you can stop self-isolation  When you are sick with COVID-19, you should stay away from other people. This is called self-isolation. The rules for ending self-isolation depend on your health in general.  If you are normally healthy  You can stop self-isolation when all 3 of these are true:    You ve had no fever--and no medicine that reduces fever--for 3 full days (72 hours). And     Your symptoms are better, such as cough or trouble breathing. And     At least 7 days have passed since your symptoms first started.  Talk with your care team before you leave home. They may tell you it s okay to leave, or they may give you different advice.  If you have a weak immune system  If you re being treated for cancer, have an immune disorder (such as HIV), or have had a transplant (organ or bone marrow), follow your care team s instructions. You may be able to end self-isolation when all 3 of these are true:    You ve had no fever--and no medicine that reduces fever--for 3 full days (72 hours). And     Your symptoms are better, such as cough or trouble breathing. And     You ve had 2 tests for COVID-19. The tests happened at least 24 hours apart, and both show that you don t have the virus. (If no tests are available, your care team may tell you to follow the rules for normally healthy people above.)  When to call your care team  Call your care team right away if a sick person has any of these:    Trouble breathing    Pain or pressure in the chest  If a sick person has any of these, call 911:    Trouble breathing that gets worse    Pain or pressure in the chest that gets worse    Blue tint to lips or face    Fast or irregular heartbeat    Confusion or trouble waking    Fainting or loss of consciousness    Coughing up blood    Sweating  Going home from the hospital  If you have COVID-19 and were  "recently in the hospital:    Follow the instructions above for self-care and isolation.    Follow the hospital care team s instructions.    Ask questions if anything is unclear to you. Write down answers so you remember them.  Last modified date: 4/24/2020  This information has been modified by your health care provider with permission from the publisher.       [id number] [employer], [address] [place] [address], [place] [address]. All rights reserved. This information is not intended as a substitute for professional medical care. Always follow your healthcare professional's instructions. This information has been modified by your health care provider with permission from the publisher.    Coronavirus Disease 2019 (COVID-19): Prevention  The best prevention is to not have contact with the SARS-CoV-2 virus. There is no vaccine yet.  Canceling travel and other outings  Stay informed about COVID-19 in your area. Follow local instructions about being in public. Be aware of events in your community that may be postponed or canceled, such as school and sporting events. You may be advised not to attend public gatherings.   You will be advised to stay at least 6 feet from others as much as possible. This is called \"social distancing.\" You may be advised to stay at home and isolate yourself as much as possible if COVID-19 is in your area. You may hear terms such as \"self isolate, \"quarantine,\"  stay at home,   shelter in place,  and  lockdown.   The CDC advises that people should not travel to areas where there are COVID-19 outbreaks right now for any reason that is not urgent. For the most current CDC travel advisories, visit the CDC website at www.cdc.gov/coronavirus/2019-ncov/travelers. Don t go on cruises or do non-essential travel right now.   When you are at home    Wash your hands often. Use soap and clean, running water for at least 20 seconds.    If you don't have access to soap and water, use an alcohol-based hand  " "often. Make sure it has at least 60% alcohol.    Don't touch your eyes, nose, or mouth unless you have clean hands.    Don t kiss someone who is sick.    If you need to cough or sneeze, do it into a tissue. Then throw the tissue into the trash. If you don't have tissues, cough or sneeze into the bend of your elbow.    When possible, don't touch \"high-touch\" shared surfaces such as doorknobs and handles, cabinet handles, and light switches.    Clean frequently-touched home surfaces often with disinfectant. This includes desk surfaces, printers, phones, kitchen counters, tables, fridge door handle, bathroom surfaces, and any soiled surface. Closely follow disinfectant label instructions. Go to the CDC s detailed cleaning website at www.cdc.gov/coronavirus/2019-ncov/prepare/cleaning-disinfection.html.    Check your home supplies. Consider keeping a 2-week supply of medicines, food, and other needed household items.    Make a plan for childcare, work, and ways to stay in touch with others. Know who will help you if you get sick.    Don't be around people who are sick.    There is no evidence right now that animals spread SARS-CoV-2. But it's always a good idea to wash your hands after touching any animals. Don't touch animals that may be sick.    Don t share eating or drinking utensils with sick people.     If you leave home    Stay at least 6 feet away from all people.    When possible, don't touch \"high-touch\" public surfaces such as doorknobs and handles, cabinet handles, and light switches. If you touch these surfaces, try to clean them first with a disinfecting wipe. Or touch them using a tissue or paper towel.    Use an alcohol-based hand  often. Make sure it has at least 60% alcohol.    Don't touch your eyes, nose, or mouth unless you have clean hands.    If you need to cough or sneeze, do it into a tissue. Then throw the tissue into the trash. If you don't have tissues, cough or sneeze into the bend of " "your elbow.    Avoid public gatherings.    The CDC advises wearing a cloth face mask in public. During a public health emergency, medical face masks may be reserved for healthcare workers. You may need to make a cloth face mask of your own. You can do this using a bandana, T-shirt, or other cloth. [facility] has instructions on how to make a mask.     If you are at a work site    Stay at least 6 feet away from all people.    Don't shake hands with anyone.    Don t have in-person meetings. Meet over phone or video.    Wash your hands often. Use soap and clean, running water for at least 20 seconds.    If you don't have access to soap and water, use an alcohol-based hand  often. Make sure it has at least 60% alcohol.    Don't touch your eyes, nose, or mouth unless you have clean hands.    The CDC advises wearing a cloth face mask in public. During a public health emergency, medical face masks may be reserved for healthcare workers. You may need to make a cloth face mask of your own. You can do this using a bandana, T-shirt, or other cloth. [facility] has instructions on how to make a mask.    When possible, don't touch \"high-touch\" public surfaces such as doorknobs and handles, cabinet handles, and light switches. If you touch these surfaces, clean them first with a disinfecting wipe. Or touch them using a tissue or paper towel.    Use office bambi one person at a time.    Consider not having office coffee or tea, or group foods.    Don t have meals in groups.    Clean work surfaces often with disinfectant. This includes desk surfaces, photocopier, printer, phones, kitchen counters, fridge door handle, bathroom surfaces, and others.    Don t touch other people s personal work tools, such as phones, keyboards, pens, and other items.    Don t touch other people s eating or drinking utensils.    If you need to cough or sneeze, do it into a tissue. Then throw the tissue into the trash. If you don't have tissues, " "cough or sneeze into the bend of your elbow.    If you feel sick in any way, go home and stay home.     If you have been exposed to a person with COVID-19  If you've been exposed within that last 14 days to someone who is suspected of having COVID-19 or has tested positive for it:    Call your healthcare provider and follow all instructions. Your activities and where you go likely will be restricted for up to 2 weeks. Check your community's instructions about activity restrictions. You may be directed to stay home, or \"self-isolate.\"    Take your temperature every morning and evening for at least 14 days. This is to check for fever. Keep a record of the readings.    Watch for symptoms of the virus. Call your provider if you have symptoms. Call your provider first before going to any clinic or hospital. See the CDC's symptom .    Stay home if you are sick for any reason.  When to call your healthcare provider  Call your healthcare provider if think you have COVID-19 symptoms. These can include fever, cough, and trouble breathing. They may also include body aches, sore throat, or diarrhea. Don t go to a healthcare facility before speaking to a healthcare provider.   Last modified date: 4/6/2020  "[employer] last reviewed this educational content on 4/1/2020 2000-2020 [employer]. [address] [place] [address]. All rights reserved. This information is not intended as a substitute for professional medical care. Always follow your healthcare professional's instructions.                 Return in about 1 month (around 11/1/2020) for Physical Exam.    Candace Lara MD  [facility]      Video-Visit Details    Type of service:  Video Visit    Video End Time:8:55 AM    Originating Location (pt. Location): Home    Distant Location (provider location):  [facility]     Platform used for Video Visit: Doxmeli          "

## 2020-10-01 NOTE — TELEPHONE ENCOUNTER
Dr. aLra wanted pt triaged for covid symptoms.   She is on 8:30 scheduled today for strep test.    Left message at cell for pt to call.    Tasia Dennis RN

## 2020-10-01 NOTE — TELEPHONE ENCOUNTER
"Pt returned call.  Cough started 9/15 and has persisted, worse yesterday, kept her up all night.  Sore through started on the 19th- worse yesterday and today- tender to the touch.    Has had HA, diarrhea, body aches, fatigue and chills.  Yesterday felt worse like she had the \"flu.\"   Has not had any fevers per pt.  Had a covid test at [facility] on 9/21 that was negative per pt.  Changed apt to video visit per PCP's request.      Tasia Dennis RN    "

## 2020-10-02 DIAGNOSIS — J06.9 UPPER RESPIRATORY TRACT INFECTION, UNSPECIFIED TYPE: ICD-10-CM

## 2020-10-02 DIAGNOSIS — J02.9 SORE THROAT: ICD-10-CM

## 2020-10-02 DIAGNOSIS — Z11.59 SCREENING FOR VIRAL DISEASE: ICD-10-CM

## 2020-10-02 PROCEDURE — U0003 INFECTIOUS AGENT DETECTION BY NUCLEIC ACID (DNA OR RNA); SEVERE ACUTE RESPIRATORY SYNDROME CORONAVIRUS 2 (SARS-COV-2) (CORONAVIRUS DISEASE [COVID-19]), AMPLIFIED PROBE TECHNIQUE, MAKING USE OF HIGH THROUGHPUT TECHNOLOGIES AS DESCRIBED BY CMS-2020-01-R: HCPCS | Performed by: INTERNAL MEDICINE

## 2020-10-03 LAB
SARS-COV-2 RNA SPEC QL NAA+PROBE: NOT DETECTED
SPECIMEN SOURCE: NORMAL

## 2020-10-04 NOTE — RESULT ENCOUNTER NOTE
Eleanor Alex,    This is to inform you regarding your test result.    Your PCR test for Covid-19 was negative .        Sincerely,      Dr.Nasima Marco MD,FACP

## 2020-12-14 ENCOUNTER — HEALTH MAINTENANCE LETTER (OUTPATIENT)
Age: 35
End: 2020-12-14

## 2021-01-28 DIAGNOSIS — F33.0 MAJOR DEPRESSIVE DISORDER, RECURRENT EPISODE, MILD (H): ICD-10-CM

## 2021-01-28 DIAGNOSIS — F41.9 ANXIETY: ICD-10-CM

## 2021-01-28 RX ORDER — ESCITALOPRAM OXALATE 20 MG/1
20 TABLET ORAL DAILY
Qty: 90 TABLET | Refills: 1 | Status: SHIPPED | OUTPATIENT
Start: 2021-01-28 | End: 2021-06-17

## 2021-06-17 ENCOUNTER — OFFICE VISIT (OUTPATIENT)
Dept: OBGYN | Facility: CLINIC | Age: 36
End: 2021-06-17
Payer: COMMERCIAL

## 2021-06-17 VITALS
DIASTOLIC BLOOD PRESSURE: 82 MMHG | BODY MASS INDEX: 37.28 KG/M2 | WEIGHT: 232 LBS | HEIGHT: 66 IN | SYSTOLIC BLOOD PRESSURE: 126 MMHG

## 2021-06-17 DIAGNOSIS — Z31.69 ENCOUNTER FOR PRECONCEPTION CONSULTATION: ICD-10-CM

## 2021-06-17 DIAGNOSIS — F33.0 MAJOR DEPRESSIVE DISORDER, RECURRENT EPISODE, MILD (H): ICD-10-CM

## 2021-06-17 DIAGNOSIS — Z01.419 ENCOUNTER FOR GYNECOLOGICAL EXAMINATION WITHOUT ABNORMAL FINDING: Primary | ICD-10-CM

## 2021-06-17 DIAGNOSIS — F41.9 ANXIETY: ICD-10-CM

## 2021-06-17 PROCEDURE — 99395 PREV VISIT EST AGE 18-39: CPT | Performed by: OBSTETRICS & GYNECOLOGY

## 2021-06-17 RX ORDER — ESCITALOPRAM OXALATE 20 MG/1
20 TABLET ORAL DAILY
Qty: 90 TABLET | Refills: 3 | Status: SHIPPED | OUTPATIENT
Start: 2021-06-17

## 2021-06-17 RX ORDER — PRENATAL VIT/IRON FUM/FOLIC AC 27MG-0.8MG
1 TABLET ORAL DAILY
COMMUNITY
End: 2023-04-21

## 2021-06-17 ASSESSMENT — ANXIETY QUESTIONNAIRES
1. FEELING NERVOUS, ANXIOUS, OR ON EDGE: SEVERAL DAYS
5. BEING SO RESTLESS THAT IT IS HARD TO SIT STILL: NOT AT ALL
2. NOT BEING ABLE TO STOP OR CONTROL WORRYING: SEVERAL DAYS
3. WORRYING TOO MUCH ABOUT DIFFERENT THINGS: SEVERAL DAYS
GAD7 TOTAL SCORE: 6
7. FEELING AFRAID AS IF SOMETHING AWFUL MIGHT HAPPEN: SEVERAL DAYS
IF YOU CHECKED OFF ANY PROBLEMS ON THIS QUESTIONNAIRE, HOW DIFFICULT HAVE THESE PROBLEMS MADE IT FOR YOU TO DO YOUR WORK, TAKE CARE OF THINGS AT HOME, OR GET ALONG WITH OTHER PEOPLE: SOMEWHAT DIFFICULT
6. BECOMING EASILY ANNOYED OR IRRITABLE: MORE THAN HALF THE DAYS

## 2021-06-17 ASSESSMENT — MIFFLIN-ST. JEOR: SCORE: 1769.1

## 2021-06-17 ASSESSMENT — PATIENT HEALTH QUESTIONNAIRE - PHQ9
5. POOR APPETITE OR OVEREATING: NOT AT ALL
SUM OF ALL RESPONSES TO PHQ QUESTIONS 1-9: 5

## 2021-06-17 NOTE — PROGRESS NOTES
"  Abi is a 36 year old  female who presents for annual exam.     Besides routine health maintenance,  she would like to discuss lexapro and ADHD testing    HPI:  Here today for yearly exam --doing fairly well.  Had virtual phone visit with me one year ago to discuss TTC.  Has been intermittently \"trying\" and then \"not trying\" to get pregnant since Dec 2019.  Tracking cycles and using home OPKs.  After our visit in 2020, shifted her home OPKs to slightly later and has been having positive results.  +timed IC.  No issues/concerns with sex --lower libido.  Unsure about testing but will contact us if/when interested.  No fertility issues in the past.  Regular, monthly menses --usually right around 30d cycles with 5d menses.  Typically 1-2 heavier days.  No intermenstrual bleeding/spotting. No cramping.  Denies bowel/bladder issues.  Rare leaking with cough/sneeze.      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  -trying to stay active; definitely struggled with weight gain, poor diet, more alcohol intake and less exercise with pandemic; bought treadmill and trying to use regularily  -no SBE; no personal or family hx breast dz  PCP --had seen Dr. Powell in the past; has seen Dr. Lara a few times since Dr. Powell left; last fasting labs in late  with PCP office  -using lexapro for anxiety/depression; restarted lexapro with PCP~6 months after Kael was born; increased to 20mg and wondering if she should be doing more; also interested in ADHD testing --willing to see specialist; brother was recently dx'd with adult onset ADHD  -completed COVID vaccine series      GYNECOLOGIC HISTORY:    Patient's last menstrual period was 2021 (exact date).    Regular menses? yes  Menses every 30 days  Length of menses: 5 days    Her current contraception method is: condoms.  She  reports " that she has never smoked. She has never used smokeless tobacco.    Patient is sexually active.  STD testing offered?  Declined  Last PHQ-9 score on record =   PHQ-9 SCORE 2021   PHQ-9 Total Score -   PHQ-9 Total Score 5     Last GAD7 score on record =   LUCERO-7 SCORE 2021   Total Score -   Total Score 6     Alcohol Score = 6    HEALTH MAINTENANCE:  Cholesterol:   Cholesterol   Date Value Ref Range Status   2019 220 (H) <200 mg/dL Final     Comment:     Desirable:       <200 mg/dl   03/15/2012 220 (H) 0 - 200 mg/dL Final     Comment:     LDL Cholesterol is the primary guide to therapy.   The NCEP recommends further evaluation of: patients with cholesterol greater   than 200 mg/dL if additional risk factors are present, cholesterol greater   than   240 mg/dL, triglycerides greater than 150 mg/dL, or HDL less than 40 mg/dL.   Last Mammo: Not applicable, Result: Not applicable, Next Mammo: Due at age 42   Pap:   Lab Results   Component Value Date    PAP NIL 10/01/2019    PAP NIL 2015    10/1/2019 WNL HPV (-)neg  Colonoscopy:  NA, Result: Not applicable, Next Colonoscopy: NA years.  Dexa:  NA    Health maintenance updated:  yes    HISTORY:  OB History    Para Term  AB Living   1 1 1 0 0 1   SAB TAB Ectopic Multiple Live Births   0 0 0 0 1      # Outcome Date GA Lbr Benedict/2nd Weight Sex Delivery Anes PTL Lv   1 Term 10/03/16 40w5d 07:00 / 03:17 3.55 kg (7 lb 13.2 oz) M Vag-Spont EPI N JUJU      Name: Kael      Apgar1: 8  Apgar5: 9       Patient Active Problem List   Diagnosis     Anxiety     Migraines     Major depressive disorder, recurrent episode, mild (H)     History of gestational diabetes     Obesity due to excess calories without serious comorbidity, unspecified classification     Infected eye lid     Past Surgical History:   Procedure Laterality Date     ENT SURGERY  2011    typanamastoidectomy     ENT SURGERY Left [id number]    10 T-tube placements     LASIK CUSTOMVUE BILATERAL  "Bilateral 5/11/2015    Procedure: LASIK CUSTOMVUE BILATERAL;  Surgeon: Maycol Bella MD;  Location:  [facility]     WAVESCAN SCREENING Bilateral 5/11/2015    Procedure: WAVESCAN SCREENING;  Surgeon: Maycol Bella MD;  [facility]      Social History     Tobacco Use     Smoking status: Never Smoker     Smokeless tobacco: Never Used   Substance Use Topics     Alcohol use: Yes     Alcohol/week: 0.0 standard drinks     Comment: one drink      Problem (# of Occurrences) Relation (Name,Age of Onset)    Allergies (1) Father    Anxiety Disorder (1) Brother    Asthma (1) Brother    Diabetes (1) Father    High cholesterol (1) Father    Hypertension (2) Father, Brother    No Known Problems (6) Mother, Sister, Maternal Grandmother, Maternal Grandfather, Paternal Grandmother, Other       Negative family history of: Breast Cancer, Colon Cancer            Current Outpatient Medications   Medication Sig     escitalopram (LEXAPRO) 20 MG tablet Take 1 tablet (20 mg) by mouth daily     Prenatal Vit-Fe Fumarate-FA (PRENATAL MULTIVITAMIN W/IRON) 27-0.8 MG tablet Take 1 tablet by mouth daily     propranolol (INDERAL) 20 MG tablet TAKE 1 TABLET (20 MG) BY MOUTH DAILY AS NEEDED     rizatriptan (MAXALT-MLT) 5 MG ODT tab Take 1 tablet (5 mg) by mouth at onset of headache for migraine     No current facility-administered medications for this visit.      Allergies   Allergen Reactions     Cats      Runny nose, redness under eyes, sniffling       Past medical, surgical, social and family histories were reviewed and updated in EPIC.    ROS:   12 point review of systems negative other than symptoms noted below or in the HPI.  No urinary frequency or dysuria, bladder or kidney problems, Normal menstrual cycles    EXAM:  /82   Ht 1.676 m (5' 6\")   Wt 105.2 kg (232 lb)   LMP 06/08/2021 (Exact Date)   Breastfeeding No   BMI 37.45 kg/m     BMI: Body mass index is 37.45 kg/m .    PHYSICAL EXAM:  Constitutional:   Appearance: Well " nourished, well developed, alert, in no acute distress  Neck:  Lymph Nodes:  No lymphadenopathy present    Thyroid:  Gland size normal, nontender, no nodules or masses present  on palpation  Chest:  Respiratory Effort:  Breathing unlabored  Cardiovascular:    Heart: Auscultation:  Regular rate, normal rhythm, no murmurs present  Breasts: Inspection of Breasts:  No lymphadenopathy present., Palpation of Breasts and Axillae:  No masses present on palpation, no breast tenderness., Axillary Lymph Nodes:  No lymphadenopathy present. and No nodularity, asymmetry or nipple discharge bilaterally.  Gastrointestinal:   Abdominal Examination:  Abdomen nontender to palpation, tone normal without rigidity or guarding, no masses present, umbilicus without lesions   Liver and Spleen:  No hepatomegaly present, liver nontender to palpation    Hernias:  No hernias present  Lymphatic: Lymph Nodes:  No other lymphadenopathy present  Skin:  General Inspection:  No rashes present, no lesions present, no areas of  discoloration  Neurologic:    Mental Status:  Oriented X3.  Normal strength and tone, sensory exam                grossly normal, mentation intact and speech normal.    Psychiatric:   Mentation appears normal and affect normal/bright.         Pelvic Exam:  External Genitalia:     Normal appearance for age, no discharge present, no tenderness present, no inflammatory lesions present, color normal  Vagina:     Normal vaginal vault without central or paravaginal defects, no discharge present, no inflammatory lesions present, no masses present  Bladder:     Nontender to palpation  Urethra:   Urethral Body:  Urethra palpation normal, urethra structural support normal   Urethral Meatus:  No erythema or lesions present  Cervix:     Appearance healthy, no lesions present, nontender to palpation, no bleeding present  Uterus:     Uterus: firm, normal sized and nontender, anteverted in position.   Adnexa:     No adnexal tenderness present,  no adnexal masses present  Perineum:     Perineum within normal limits, no evidence of trauma, no rashes or skin lesions present  Anus:     Anus within normal limits, no hemorrhoids present  Inguinal Lymph Nodes:     No lymphadenopathy present  Pubic Hair:     Normal pubic hair distribution for age  Genitalia and Groin:     No rashes present, no lesions present, no areas of discoloration, no masses present      COUNSELING:   Reviewed preventive health counseling, as reflected in patient instructions  Special attention given to:        Regular exercise       Healthy diet/nutrition       Contraception       Family planning    BMI: Body mass index is 37.45 kg/m .  Weight management plan: Discussed healthy diet and exercise guidelines Patient was referred to their PCP to discuss a diet and exercise plan.    ASSESSMENT:  36 year old female with satisfactory annual exam.    ICD-10-CM    1. Encounter for gynecological examination without abnormal finding  Z01.419    2. Major depressive disorder, recurrent episode, mild (H)  F33.0 escitalopram (LEXAPRO) 20 MG tablet   3. Anxiety  F41.9 escitalopram (LEXAPRO) 20 MG tablet   4. BMI 37.0-37.9, adult  Z68.37    5. Encounter for preconception consultation  Z31.69        PLAN:  Patient Instructions   Follow up with your primary care provider for your other medical problems.  Continue self breast exam.  Increase physical activity and exercise.  PHQ-9/LUCERO-7 scores were discussed.  Will continue on her current dose of lexapro and follow up with [facility] to discuss both possible testing for ADHD as well as possible medication change/adjustments.    Usual safety and preventative measures counseling done.  BMI >25  Weight loss encouraged.  Last pap smear (2019) was normal and negative for the DNA of high risk HPV subtypes.  No pap was obtained this year.  This was discussed with the patient and she agrees with the plan.  Will continue tracking and monitoring cycles.  Given  the elapsed time since officially starting to try to conceive, may consider fertility testing at any time.  Would recommend day #3 labs (FSH, E2, AMH, TSH, prolactin) and day #21 progesterone for Abi and semen analysis for her .      Zonia Hunter MD

## 2021-06-17 NOTE — PATIENT INSTRUCTIONS
Follow up with your primary care provider for your other medical problems.  Continue self breast exam.  Increase physical activity and exercise.  PHQ-9/LUCERO-7 scores were discussed.  Will continue on her current dose of lexapro and follow up with [facility] to discuss both possible testing for ADHD as well as possible medication change/adjustments.    Usual safety and preventative measures counseling done.  BMI >25  Weight loss encouraged.  Last pap smear (2019) was normal and negative for the DNA of high risk HPV subtypes.  No pap was obtained this year.  This was discussed with the patient and she agrees with the plan.  Will continue tracking and monitoring cycles.  Given the elapsed time since officially starting to try to conceive, may consider fertility testing at any time.  Would recommend day #3 labs (FSH, E2, AMH, TSH, prolactin) and day #21 progesterone for Abi and semen analysis for her .

## 2021-06-18 ASSESSMENT — ANXIETY QUESTIONNAIRES: GAD7 TOTAL SCORE: 6

## 2021-10-02 ENCOUNTER — HEALTH MAINTENANCE LETTER (OUTPATIENT)
Age: 36
End: 2021-10-02

## 2022-02-24 ENCOUNTER — PRENATAL OFFICE VISIT (OUTPATIENT)
Dept: NURSING | Facility: CLINIC | Age: 37
End: 2022-02-24
Payer: COMMERCIAL

## 2022-02-24 VITALS — BODY MASS INDEX: 37.28 KG/M2 | HEIGHT: 66 IN | WEIGHT: 232 LBS

## 2022-02-24 DIAGNOSIS — O36.80X0 PREGNANCY WITH INCONCLUSIVE FETAL VIABILITY: Primary | ICD-10-CM

## 2022-02-24 DIAGNOSIS — O09.91 SUPERVISION OF HIGH RISK PREGNANCY IN FIRST TRIMESTER: ICD-10-CM

## 2022-02-24 PROBLEM — O09.90 SUPERVISION OF HIGH-RISK PREGNANCY: Status: ACTIVE | Noted: 2022-02-24

## 2022-02-24 PROCEDURE — 99207 PR NO CHARGE NURSE ONLY: CPT

## 2022-02-24 RX ORDER — LAMOTRIGINE 25 MG/1
25 TABLET, CHEWABLE ORAL EVERY MORNING
COMMUNITY

## 2022-02-24 NOTE — PROGRESS NOTES
SUBJECTIVE:     HPI:    This is a 36 year old female patient,  who presents for her first obstetrical visit.    KATTY: 10/6/2022, by Last Menstrual Period.  She is 8w0d weeks.  Her cycles are regular-28-31 days.  Her last menstrual period was normal.   Since her LMP, she has experienced  nausea, fatigue and urinary frequency.    Additional History:  2016 Eduard, GDM diet controlled    Have you travelled during the pregnancy?No  Have your sexual partner(s) travelled during the pregnancy?No    HISTORY:   Planned Pregnancy: Yes  Marital Status:   Occupation:  [employer]  Living in Household:  Reinier, son Kael    Past History:  Her past medical history   Past Medical History:   Diagnosis Date     Anxiety      Cholesteatoma 2012     Gestational diabetes     A1GDM     Major depressive disorder, recurrent episode, mild (H)      Migraines    .      She has a history of  gestational diabetes, diet controlled    Since her last LMP she denies use of alcohol, tobacco and street drugs.    Past medical, surgical, social and family history were reviewed and updated in [facility].      Current Outpatient Medications   Medication     escitalopram (LEXAPRO) 20 MG tablet     lamoTRIgine (LAMICTAL) 25 MG chewable tablet     Prenatal Vit-Fe Fumarate-FA (PRENATAL MULTIVITAMIN W/IRON) 27-0.8 MG tablet     rizatriptan (MAXALT-MLT) 5 MG ODT tab     propranolol (INDERAL) 20 MG tablet     No current facility-administered medications for this visit.       ROS:   12 point review of systems negative other than symptoms noted below or in the HPI.  Constitutional: Fatigue  Gastrointestinal: Nausea  Genitourinary: Frequency    Nurse phone visit completed. Prenatal book and folder containing standard educational hand-outs and brochures will be given at the next visit to patient. Information in folder reviewed over the phone. Questions answered. Information given on optional screening available to assess  "chromosomal anomalies. Pt desires NIPS. Pt advised to call the clinic if she has any questions or concerns related to her pregnancy. Prenatal labs future ordered.   Anne Goel RN on 2/24/2022 at 1:21 PM        Lab Results   Component Value Date    PAP NIL 10/01/2019     PHQ-9 score:    PHQ 2/24/2022   PHQ-9 Total Score 7   Q9: Thoughts of better off dead/self-harm past 2 weeks Not at all               LUCERO-7 SCORE 10/1/2019 6/17/2021 2/24/2022   Total Score - - -   Total Score - - 4 (minimal anxiety)   Total Score 2 6 4             Patient supplied answers from flow sheet for:  Prenatal OB Questionnaire.  Past Medical History  Have you ever recieved care for your mental health? : (!) Yes  Have you ever been in a major accident or suffered serious trauma?: No  Within the last year, has anyone hit, slapped, kicked or otherwise hurt you?: No  In the last year, has anyone forced you to have sex when you didn't want to?: No    Past Medical History 2   Have you ever received a blood transfusion?: No  Would you accept a blood transfusion if was medically recommended?: Yes  Does anyone in your home smoke?: No   Is your blood type Rh negative?: No  Have you ever ?: (!) Yes  Have you been hospitalized for a nonsurgical reason excluding normal delivery?: No  Have you ever had an abnormal pap smear?: No    Past Medical History (Continued)  Do you have a history of abnormalities of the uterus?: No  Did your mother take JONG or any other hormones when she was pregnant with you?: Unknown  Do you have any other problems we have not asked about which you feel may be important to this pregnancy?: No                   OBJECTIVE:     EXAM:  Ht 1.676 m (5' 6\")   Wt 105.2 kg (232 lb)   LMP 12/30/2021   BMI 37.45 kg/m   Body mass index is 37.45 kg/m .      "

## 2022-03-01 NOTE — PROGRESS NOTES
SUBJECTIVE:      HPI:     This is a 36 year old female patient,  who presents for her first obstetrical visit.     KATTY: 10/6/2022, by Last Menstrual Period.  She is 8w0d weeks.  Her cycles are regular-28-31 days.  Her last menstrual period was normal.   Since her LMP, she has experienced  nausea, fatigue and urinary frequency.     Additional History:  2016 Kael, GDM diet controlled    New OB visit --2nd pregnancy.  Sure LMP but longer than 28d cycles.  More commonly 32-33d cycles.  US shows viable IUP at 8+0wks with EDC 10/13/22.  No vb/spotting.  No cramping.  +fatigue and some nausea but both tolerable.  No bowel/bladder issues    AMA --would like invitae testing; agrees to AFP testing and Level IIus  BMI >35 --level II us  -maternal anxiety/depression  --does well on lexapro  -hx A1GDM with her son  --has completed covid vaccine and booster; had flu shot       Have you travelled during the pregnancy?No  Have your sexual partner(s) travelled during the pregnancy?No     HISTORY:   Planned Pregnancy: Yes  Marital Status:   Occupation:  [employer]  Living in Household:  Reinier, son Kael     Past History:  Her past medical history   Past Medical History        Past Medical History:   Diagnosis Date     Anxiety       Cholesteatoma 2012     Gestational diabetes       A1GDM     Major depressive disorder, recurrent episode, mild (H)       Migraines        .       She has a history of  gestational diabetes, diet controlled     Since her last LMP she denies use of alcohol, tobacco and street drugs.     Past medical, surgical, social and family history were reviewed and updated in EPIC.            Current Outpatient Medications   Medication     escitalopram (LEXAPRO) 20 MG tablet     lamoTRIgine (LAMICTAL) 25 MG chewable tablet     Prenatal Vit-Fe Fumarate-FA (PRENATAL MULTIVITAMIN W/IRON) 27-0.8 MG tablet     rizatriptan (MAXALT-MLT) 5 MG ODT tab     propranolol (INDERAL) 20 MG  tablet      No current facility-administered medications for this visit.         ROS:   12 point review of systems negative other than symptoms noted below or in the HPI.  Constitutional: Fatigue  Gastrointestinal: Nausea  Genitourinary: Frequency     Nurse phone visit completed. Prenatal book and folder containing standard educational hand-outs and brochures will be given at the next visit to patient. Information in folder reviewed over the phone. Questions answered. Information given on optional screening available to assess chromosomal anomalies. Pt desires NIPS. Pt advised to call the clinic if she has any questions or concerns related to her pregnancy. Prenatal labs future ordered.   Anne Goel RN on 2/24/2022 at 1:21 PM                 Lab Results   Component Value Date     PAP NIL 10/01/2019      PHQ-9 score:    PHQ 2/24/2022   PHQ-9 Total Score 7   Q9: Thoughts of better off dead/self-harm past 2 weeks Not at all                     LUCERO-7 SCORE 10/1/2019 6/17/2021 2/24/2022   Total Score - - -   Total Score - - 4 (minimal anxiety)   Total Score 2 6 4                  Patient supplied answers from flow sheet for:  Prenatal OB Questionnaire.  Past Medical History  Have you ever recieved care for your mental health? : (!) Yes  Have you ever been in a major accident or suffered serious trauma?: No  Within the last year, has anyone hit, slapped, kicked or otherwise hurt you?: No  In the last year, has anyone forced you to have sex when you didn't want to?: No     Past Medical History 2   Have you ever received a blood transfusion?: No  Would you accept a blood transfusion if was medically recommended?: Yes  Does anyone in your home smoke?: No   Is your blood type Rh negative?: No  Have you ever ?: (!) Yes  Have you been hospitalized for a nonsurgical reason excluding normal delivery?: No  Have you ever had an abnormal pap smear?: No     Past Medical History (Continued)  Do you have a history of  abnormalities of the uterus?: No  Did your mother take JONG or any other hormones when she was pregnant with you?: Unknown  Do you have any other problems we have not asked about which you feel may be important to this pregnancy?: No        OBJECTIVE:     EXAM:  /78   Wt 108.6 kg (239 lb 6.4 oz)   LMP 2021   Breastfeeding No   BMI 38.64 kg/m   Body mass index is 38.64 kg/m .    GENERAL: healthy, alert and no distress  EYES: Eyes grossly normal to inspection, PERRL and conjunctivae and sclerae normal  HENT: ear canals and TM's normal, nose and mouth without ulcers or lesions  NECK: no adenopathy, no asymmetry, masses, or scars and thyroid normal to palpation  RESP: lungs clear to auscultation - no rales, rhonchi or wheezes  BREAST: normal without masses, tenderness or nipple discharge and no palpable axillary masses or adenopathy  CV: regular rate and rhythm, normal S1 S2, no S3 or S4, no murmur, click or rub, no peripheral edema and peripheral pulses strong  ABDOMEN: soft, nontender, no hepatosplenomegaly, no masses and bowel sounds normal  MS: no gross musculoskeletal defects noted, no edema  SKIN: no suspicious lesions or rashes  NEURO: Normal strength and tone, mentation intact and speech normal  PSYCH: mentation appears normal, affect normal/bright    ASSESSMENT/PLAN:       ICD-10-CM    1. Supervision of high risk pregnancy in first trimester  O09.91 Urine Culture Aerobic Bacterial     *UA reflex to Microscopic     ABO/Rh type and screen     Hepatitis B surface antigen     CBC with platelets     HIV Antigen Antibody Combo     Rubella Antibody IgG Quantitative     Treponema Abs w Reflex to RPR and Titer     Hepatitis C antibody       36 year old , On 2022 patient is 8w5d weeks of pregnancy with KATTY of 10/6/2022, by Last Menstrual Period    Discussed as follows:AMA, genetic screening, covid and vaccine    Counseling given:   - Follow up in 4-6 weeks for return OB visit.  -  Recommended weight gain for pregnancy: 15-25 lbs.      PLAN/PATIENT INSTRUCTIONS:    Patient Instructions   Avoid alcoholic beverages.  Discussed advanced maternal age and testing options.  Patient is above age 35 at delivery.  Explained risks of chromosomal abnormalities and age risks.  Discussed amnio versus non-invasive testing and their individual limitations and risks.  Patient would like non-invasive testing to start with.  Understands Invitae is a screenting test and not a diagnostic test.  Will also check aFP at 15-20wks and level II us at 18-20wks.  Discussed possible induction at 39 weeks if cervix favorable.  Discussed previous gestational diabetes and increased risks of recurrence.        Zonia Hunter MD

## 2022-03-03 ENCOUNTER — PRENATAL OFFICE VISIT (OUTPATIENT)
Dept: OBGYN | Facility: CLINIC | Age: 37
End: 2022-03-03
Payer: COMMERCIAL

## 2022-03-03 ENCOUNTER — ANCILLARY PROCEDURE (OUTPATIENT)
Dept: ULTRASOUND IMAGING | Facility: CLINIC | Age: 37
End: 2022-03-03
Payer: COMMERCIAL

## 2022-03-03 VITALS — WEIGHT: 239.4 LBS | DIASTOLIC BLOOD PRESSURE: 78 MMHG | SYSTOLIC BLOOD PRESSURE: 112 MMHG | BODY MASS INDEX: 38.64 KG/M2

## 2022-03-03 DIAGNOSIS — O09.91 SUPERVISION OF HIGH RISK PREGNANCY IN FIRST TRIMESTER: ICD-10-CM

## 2022-03-03 DIAGNOSIS — Z13.79 GENETIC SCREENING: Primary | ICD-10-CM

## 2022-03-03 DIAGNOSIS — O36.80X0 PREGNANCY WITH INCONCLUSIVE FETAL VIABILITY: ICD-10-CM

## 2022-03-03 LAB
ABO/RH(D): NORMAL
ALBUMIN UR-MCNC: NEGATIVE MG/DL
ANTIBODY SCREEN: NEGATIVE
APPEARANCE UR: CLEAR
BILIRUB UR QL STRIP: NEGATIVE
COLOR UR AUTO: YELLOW
ERYTHROCYTE [DISTWIDTH] IN BLOOD BY AUTOMATED COUNT: 11.6 % (ref 10–15)
GLUCOSE UR STRIP-MCNC: NEGATIVE MG/DL
HCT VFR BLD AUTO: 37.2 % (ref 35–47)
HGB BLD-MCNC: 12.6 G/DL (ref 11.7–15.7)
HGB UR QL STRIP: NEGATIVE
KETONES UR STRIP-MCNC: ABNORMAL MG/DL
LEUKOCYTE ESTERASE UR QL STRIP: NEGATIVE
MCH RBC QN AUTO: 31.2 PG (ref 26.5–33)
MCHC RBC AUTO-ENTMCNC: 33.9 G/DL (ref 31.5–36.5)
MCV RBC AUTO: 92 FL (ref 78–100)
NITRATE UR QL: NEGATIVE
PH UR STRIP: 5.5 [PH] (ref 5–7)
PLATELET # BLD AUTO: 334 10E3/UL (ref 150–450)
RBC # BLD AUTO: 4.04 10E6/UL (ref 3.8–5.2)
SP GR UR STRIP: >=1.03 (ref 1–1.03)
SPECIMEN EXPIRATION DATE: NORMAL
UROBILINOGEN UR STRIP-ACNC: 0.2 E.U./DL
WBC # BLD AUTO: 8.1 10E3/UL (ref 4–11)

## 2022-03-03 PROCEDURE — 86901 BLOOD TYPING SEROLOGIC RH(D): CPT | Performed by: OBSTETRICS & GYNECOLOGY

## 2022-03-03 PROCEDURE — 85027 COMPLETE CBC AUTOMATED: CPT | Performed by: OBSTETRICS & GYNECOLOGY

## 2022-03-03 PROCEDURE — 87389 HIV-1 AG W/HIV-1&-2 AB AG IA: CPT | Performed by: OBSTETRICS & GYNECOLOGY

## 2022-03-03 PROCEDURE — 86762 RUBELLA ANTIBODY: CPT | Performed by: OBSTETRICS & GYNECOLOGY

## 2022-03-03 PROCEDURE — 81003 URINALYSIS AUTO W/O SCOPE: CPT | Performed by: OBSTETRICS & GYNECOLOGY

## 2022-03-03 PROCEDURE — 86803 HEPATITIS C AB TEST: CPT | Performed by: OBSTETRICS & GYNECOLOGY

## 2022-03-03 PROCEDURE — 87086 URINE CULTURE/COLONY COUNT: CPT | Performed by: OBSTETRICS & GYNECOLOGY

## 2022-03-03 PROCEDURE — 99207 PR FIRST OB VISIT: CPT | Performed by: OBSTETRICS & GYNECOLOGY

## 2022-03-03 PROCEDURE — 86850 RBC ANTIBODY SCREEN: CPT | Performed by: OBSTETRICS & GYNECOLOGY

## 2022-03-03 PROCEDURE — 87340 HEPATITIS B SURFACE AG IA: CPT | Performed by: OBSTETRICS & GYNECOLOGY

## 2022-03-03 PROCEDURE — 76817 TRANSVAGINAL US OBSTETRIC: CPT | Performed by: OBSTETRICS & GYNECOLOGY

## 2022-03-03 PROCEDURE — 36415 COLL VENOUS BLD VENIPUNCTURE: CPT | Performed by: OBSTETRICS & GYNECOLOGY

## 2022-03-03 PROCEDURE — 86900 BLOOD TYPING SEROLOGIC ABO: CPT | Performed by: OBSTETRICS & GYNECOLOGY

## 2022-03-03 PROCEDURE — 86780 TREPONEMA PALLIDUM: CPT | Performed by: OBSTETRICS & GYNECOLOGY

## 2022-03-03 NOTE — PATIENT INSTRUCTIONS
Avoid alcoholic beverages.  Discussed advanced maternal age and testing options.  Patient is above age 37 at delivery.  Explained risks of chromosomal abnormalities and age risks.  Discussed amnio versus non-invasive testing and their individual limitations and risks.  Patient would like non-invasive testing to start with.  Understands Invitae is a screenting test and not a diagnostic test.  Will also check aFP at 15-20wks and level II us at 18-20wks.  Discussed possible induction at 39 weeks if cervix favorable.  Discussed previous gestational diabetes and increased risks of recurrence.

## 2022-03-04 LAB
HBV SURFACE AG SERPL QL IA: NONREACTIVE
HCV AB SERPL QL IA: NONREACTIVE
HIV 1+2 AB+HIV1 P24 AG SERPL QL IA: NONREACTIVE
RUBV IGG SERPL QL IA: 1.13 INDEX
RUBV IGG SERPL QL IA: POSITIVE
T PALLIDUM AB SER QL: NONREACTIVE

## 2022-03-05 LAB — BACTERIA UR CULT: NORMAL

## 2022-03-21 ENCOUNTER — LAB (OUTPATIENT)
Dept: LAB | Facility: CLINIC | Age: 37
End: 2022-03-21
Payer: COMMERCIAL

## 2022-03-21 DIAGNOSIS — Z13.79 GENETIC SCREENING: ICD-10-CM

## 2022-03-21 PROCEDURE — 36415 COLL VENOUS BLD VENIPUNCTURE: CPT

## 2022-03-28 ENCOUNTER — TELEPHONE (OUTPATIENT)
Dept: OBGYN | Facility: CLINIC | Age: 37
End: 2022-03-28
Payer: COMMERCIAL

## 2022-03-28 LAB — SCANNED LAB RESULT: NORMAL

## 2022-03-28 NOTE — TELEPHONE ENCOUNTER
Patient asked if she could get a call back with the gender of her baby, okay to leave a voicemail.

## 2022-03-31 ENCOUNTER — PRENATAL OFFICE VISIT (OUTPATIENT)
Dept: OBGYN | Facility: CLINIC | Age: 37
End: 2022-03-31
Payer: COMMERCIAL

## 2022-03-31 ENCOUNTER — TRANSCRIBE ORDERS (OUTPATIENT)
Dept: MATERNAL FETAL MEDICINE | Facility: CLINIC | Age: 37
End: 2022-03-31

## 2022-03-31 VITALS — SYSTOLIC BLOOD PRESSURE: 122 MMHG | WEIGHT: 233 LBS | BODY MASS INDEX: 37.61 KG/M2 | DIASTOLIC BLOOD PRESSURE: 78 MMHG

## 2022-03-31 DIAGNOSIS — O09.91 SUPERVISION OF HIGH RISK PREGNANCY IN FIRST TRIMESTER: ICD-10-CM

## 2022-03-31 DIAGNOSIS — Z3A.12 12 WEEKS GESTATION OF PREGNANCY: Primary | ICD-10-CM

## 2022-03-31 DIAGNOSIS — O26.90 PREGNANCY RELATED CONDITION, ANTEPARTUM: Primary | ICD-10-CM

## 2022-03-31 PROCEDURE — 99207 PR PRENATAL VISIT: CPT | Performed by: OBSTETRICS & GYNECOLOGY

## 2022-03-31 NOTE — PROGRESS NOTES
Doing well today.  No issues/concerns  No vb/spotting/lof  Fatigue improving; appetite pretty good --just trouble finding what sounds good but no nausea/vomiting  No bowel/bladder issues --considering starting fiber gummies  RTC 4wks --AFP and will do early glucola at that time due to obesity, AMA and prior hx A1GDM in first pregnancy  Will arrange Level II us for AMA with MFM

## 2022-04-26 DIAGNOSIS — Z36.9 ENCOUNTER FOR ANTENATAL SCREENING OF MOTHER: Primary | ICD-10-CM

## 2022-05-03 ENCOUNTER — PRENATAL OFFICE VISIT (OUTPATIENT)
Dept: OBGYN | Facility: CLINIC | Age: 37
End: 2022-05-03
Payer: COMMERCIAL

## 2022-05-03 VITALS — WEIGHT: 235 LBS | DIASTOLIC BLOOD PRESSURE: 78 MMHG | SYSTOLIC BLOOD PRESSURE: 118 MMHG | BODY MASS INDEX: 37.93 KG/M2

## 2022-05-03 DIAGNOSIS — Z3A.16 16 WEEKS GESTATION OF PREGNANCY: ICD-10-CM

## 2022-05-03 DIAGNOSIS — Z86.32 HISTORY OF GESTATIONAL DIABETES: ICD-10-CM

## 2022-05-03 DIAGNOSIS — O09.92 SUPERVISION OF HIGH RISK PREGNANCY IN SECOND TRIMESTER: Primary | ICD-10-CM

## 2022-05-03 PROCEDURE — 99207 PR PRENATAL VISIT: CPT | Performed by: OBSTETRICS & GYNECOLOGY

## 2022-05-03 PROCEDURE — 99000 SPECIMEN HANDLING OFFICE-LAB: CPT | Performed by: OBSTETRICS & GYNECOLOGY

## 2022-05-03 PROCEDURE — 82105 ALPHA-FETOPROTEIN SERUM: CPT | Mod: 90 | Performed by: OBSTETRICS & GYNECOLOGY

## 2022-05-03 PROCEDURE — 36415 COLL VENOUS BLD VENIPUNCTURE: CPT | Performed by: OBSTETRICS & GYNECOLOGY

## 2022-05-03 NOTE — PROGRESS NOTES
Doing well today.  No concerns/issues.   No FM yet  No cramping/vb/spotting/lof  Has decided not to do early glucola in spite of my concerns with AMA, BMI 37 and hx of gestational diabetes; understands the benefit of earlier diagnosis and sugar control but refuses early testing and taking the risk of needing to repeat at 28wks  Normal invitae testing; will check AFP today and has level II us with MFM scheduled later this month  RTC 4wks

## 2022-05-06 LAB
# FETUSES US: NORMAL
AFP MOM SERPL: 1.48
AFP SERPL-MCNC: 41 NG/ML
AGE - REPORTED: 35.6 YR
CURRENT SMOKER: NO
FAMILY MEMBER DISEASES HX: NO
GA METHOD: NORMAL
GA: NORMAL WK
IDDM PATIENT QL: NO
INTEGRATED SCN PATIENT-IMP: NORMAL
SPECIMEN DRAWN SERPL: NORMAL

## 2022-05-13 ENCOUNTER — LAB (OUTPATIENT)
Dept: LAB | Facility: CLINIC | Age: 37
End: 2022-05-13
Payer: COMMERCIAL

## 2022-05-13 DIAGNOSIS — Z36.9 ENCOUNTER FOR ANTENATAL SCREENING OF MOTHER: ICD-10-CM

## 2022-05-13 LAB — GLUCOSE 1H P 50 G GLC PO SERPL-MCNC: 114 MG/DL (ref 70–129)

## 2022-05-13 PROCEDURE — 36415 COLL VENOUS BLD VENIPUNCTURE: CPT

## 2022-05-13 PROCEDURE — 82950 GLUCOSE TEST: CPT

## 2022-05-17 ENCOUNTER — PRE VISIT (OUTPATIENT)
Dept: MATERNAL FETAL MEDICINE | Facility: CLINIC | Age: 37
End: 2022-05-17
Payer: COMMERCIAL

## 2022-05-24 ENCOUNTER — HOSPITAL ENCOUNTER (OUTPATIENT)
Dept: ULTRASOUND IMAGING | Facility: CLINIC | Age: 37
Discharge: HOME | End: 2022-05-24
Attending: OBSTETRICS & GYNECOLOGY
Payer: COMMERCIAL

## 2022-05-24 ENCOUNTER — PRENATAL OFFICE VISIT (OUTPATIENT)
Dept: OBGYN | Facility: CLINIC | Age: 37
End: 2022-05-24
Payer: COMMERCIAL

## 2022-05-24 ENCOUNTER — OFFICE VISIT (OUTPATIENT)
Dept: MATERNAL FETAL MEDICINE | Facility: CLINIC | Age: 37
End: 2022-05-24
Attending: OBSTETRICS & GYNECOLOGY
Payer: COMMERCIAL

## 2022-05-24 VITALS — DIASTOLIC BLOOD PRESSURE: 82 MMHG | WEIGHT: 233 LBS | SYSTOLIC BLOOD PRESSURE: 122 MMHG | BODY MASS INDEX: 37.61 KG/M2

## 2022-05-24 DIAGNOSIS — Z3A.19 19 WEEKS GESTATION OF PREGNANCY: ICD-10-CM

## 2022-05-24 DIAGNOSIS — O09.92 SUPERVISION OF HIGH RISK PREGNANCY IN SECOND TRIMESTER: Primary | ICD-10-CM

## 2022-05-24 DIAGNOSIS — O09.522 AMA (ADVANCED MATERNAL AGE) MULTIGRAVIDA 35+, SECOND TRIMESTER: Primary | ICD-10-CM

## 2022-05-24 DIAGNOSIS — O26.90 PREGNANCY RELATED CONDITION, ANTEPARTUM: ICD-10-CM

## 2022-05-24 PROCEDURE — 76811 OB US DETAILED SNGL FETUS: CPT | Mod: 26 | Performed by: OBSTETRICS & GYNECOLOGY

## 2022-05-24 PROCEDURE — 99207 PR PRENATAL VISIT: CPT | Performed by: OBSTETRICS & GYNECOLOGY

## 2022-05-24 PROCEDURE — 76811 OB US DETAILED SNGL FETUS: CPT

## 2022-05-24 RX ORDER — LAMOTRIGINE 25 MG/1
50 TABLET ORAL DAILY
COMMUNITY
Start: 2022-04-25 | End: 2022-05-24

## 2022-05-24 NOTE — PROGRESS NOTES
Doing well today.  +flutters  No cramping/vb/spotting  No bowel/bladder issues  Had level II us today with MFM --limited heart views but otherwise normal; will repeat us in 3-4 weeks  No weight gain since last visit --appetite starting to improve; no n/v just nothing sounds good; discussed healthy diet, hydration, etc  RTC 4wks

## 2022-06-23 ENCOUNTER — HOSPITAL ENCOUNTER (OUTPATIENT)
Dept: ULTRASOUND IMAGING | Facility: CLINIC | Age: 37
Discharge: HOME | End: 2022-06-23
Attending: OBSTETRICS & GYNECOLOGY
Payer: COMMERCIAL

## 2022-06-23 ENCOUNTER — OFFICE VISIT (OUTPATIENT)
Dept: MATERNAL FETAL MEDICINE | Facility: CLINIC | Age: 37
End: 2022-06-23
Attending: OBSTETRICS & GYNECOLOGY
Payer: COMMERCIAL

## 2022-06-23 DIAGNOSIS — O09.522 AMA (ADVANCED MATERNAL AGE) MULTIGRAVIDA 35+, SECOND TRIMESTER: ICD-10-CM

## 2022-06-23 DIAGNOSIS — O09.522 AMA (ADVANCED MATERNAL AGE) MULTIGRAVIDA 35+, SECOND TRIMESTER: Primary | ICD-10-CM

## 2022-06-23 PROCEDURE — 76816 OB US FOLLOW-UP PER FETUS: CPT

## 2022-06-23 PROCEDURE — 76816 OB US FOLLOW-UP PER FETUS: CPT | Mod: 26 | Performed by: OBSTETRICS & GYNECOLOGY

## 2022-06-24 ENCOUNTER — PRENATAL OFFICE VISIT (OUTPATIENT)
Dept: OBGYN | Facility: CLINIC | Age: 37
End: 2022-06-24
Attending: OBSTETRICS & GYNECOLOGY
Payer: COMMERCIAL

## 2022-06-24 VITALS — SYSTOLIC BLOOD PRESSURE: 122 MMHG | DIASTOLIC BLOOD PRESSURE: 78 MMHG | BODY MASS INDEX: 37.61 KG/M2 | WEIGHT: 233 LBS

## 2022-06-24 DIAGNOSIS — O09.92 SUPERVISION OF HIGH RISK PREGNANCY IN SECOND TRIMESTER: Primary | ICD-10-CM

## 2022-06-24 DIAGNOSIS — Z3A.24 24 WEEKS GESTATION OF PREGNANCY: ICD-10-CM

## 2022-06-24 PROCEDURE — 99207 PR PRENATAL VISIT: CPT | Performed by: OBSTETRICS & GYNECOLOGY

## 2022-06-24 NOTE — PROGRESS NOTES
Doing well today.  +FM  No cramping/vb/spotting  Had 2 episodes of epigastric pain follow by emesis --unsure of what she may have eaten, etc.  Discussed possibility of either GERD vs gallstones --will continue to monitor  Some constipation --ordered senna; discussed hydration, fiber, etc  RTC 4wks --will repeat glucola at that visit  Had follow up us with MFM yesterday for cardiac views --all normal; recommended growth us at 34wks due to maternal obesity

## 2022-06-24 NOTE — PROGRESS NOTES
"Please see \"Imaging\" tab under \"Chart Review\" for details of today's ultrasound.    Han Benavides M.D.  Specialist in Maternal-Fetal Medicine     "

## 2022-07-13 DIAGNOSIS — Z23 NEED FOR TDAP VACCINATION: ICD-10-CM

## 2022-07-13 DIAGNOSIS — Z36.9 ENCOUNTER FOR ANTENATAL SCREENING OF MOTHER: Primary | ICD-10-CM

## 2022-07-22 ENCOUNTER — LAB (OUTPATIENT)
Dept: LAB | Facility: CLINIC | Age: 37
End: 2022-07-22

## 2022-07-22 ENCOUNTER — PRENATAL OFFICE VISIT (OUTPATIENT)
Dept: OBGYN | Facility: CLINIC | Age: 37
End: 2022-07-22
Payer: COMMERCIAL

## 2022-07-22 VITALS — BODY MASS INDEX: 37.93 KG/M2 | WEIGHT: 235 LBS | SYSTOLIC BLOOD PRESSURE: 124 MMHG | DIASTOLIC BLOOD PRESSURE: 70 MMHG

## 2022-07-22 DIAGNOSIS — O09.93 SUPERVISION OF HIGH RISK PREGNANCY IN THIRD TRIMESTER: ICD-10-CM

## 2022-07-22 DIAGNOSIS — O09.93 SUPERVISION OF HIGH RISK PREGNANCY IN THIRD TRIMESTER: Primary | ICD-10-CM

## 2022-07-22 DIAGNOSIS — Z36.9 ENCOUNTER FOR ANTENATAL SCREENING OF MOTHER: ICD-10-CM

## 2022-07-22 DIAGNOSIS — Z23 NEED FOR TDAP VACCINATION: ICD-10-CM

## 2022-07-22 LAB
ERYTHROCYTE [DISTWIDTH] IN BLOOD BY AUTOMATED COUNT: 13.2 % (ref 10–15)
GLUCOSE 1H P 50 G GLC PO SERPL-MCNC: 118 MG/DL (ref 70–129)
HCT VFR BLD AUTO: 34.1 % (ref 35–47)
HGB BLD-MCNC: 11.5 G/DL (ref 11.7–15.7)
MCH RBC QN AUTO: 30.9 PG (ref 26.5–33)
MCHC RBC AUTO-ENTMCNC: 33.7 G/DL (ref 31.5–36.5)
MCV RBC AUTO: 92 FL (ref 78–100)
PLATELET # BLD AUTO: 283 10E3/UL (ref 150–450)
RBC # BLD AUTO: 3.72 10E6/UL (ref 3.8–5.2)
WBC # BLD AUTO: 9.5 10E3/UL (ref 4–11)

## 2022-07-22 PROCEDURE — 99207 PR PRENATAL VISIT: CPT | Performed by: OBSTETRICS & GYNECOLOGY

## 2022-07-22 PROCEDURE — 36415 COLL VENOUS BLD VENIPUNCTURE: CPT

## 2022-07-22 PROCEDURE — 90471 IMMUNIZATION ADMIN: CPT | Performed by: OBSTETRICS & GYNECOLOGY

## 2022-07-22 PROCEDURE — 82950 GLUCOSE TEST: CPT

## 2022-07-22 PROCEDURE — 85027 COMPLETE CBC AUTOMATED: CPT

## 2022-07-22 PROCEDURE — 90715 TDAP VACCINE 7 YRS/> IM: CPT | Performed by: OBSTETRICS & GYNECOLOGY

## 2022-07-22 NOTE — PROGRESS NOTES
"Doing well today.  +FM  No ctx/cramping/vb/lof; occ \"lightning crotch\"  No bowel/bladder issues  Had another episode of epigastric/back pain with nausea and emesis; had only had chicken with bbq sauce --nothing fried or fatty; discussed possibility of GERD --will try pepcid or TUMS as needed  Repeat glucola, CBC and Tdap today  Will have growth us with us at 34wks due to obesity  "

## 2022-08-04 ENCOUNTER — PRENATAL OFFICE VISIT (OUTPATIENT)
Dept: OBGYN | Facility: CLINIC | Age: 37
End: 2022-08-04
Payer: COMMERCIAL

## 2022-08-04 VITALS — SYSTOLIC BLOOD PRESSURE: 122 MMHG | WEIGHT: 235 LBS | DIASTOLIC BLOOD PRESSURE: 82 MMHG | BODY MASS INDEX: 37.93 KG/M2

## 2022-08-04 DIAGNOSIS — O09.93 SUPERVISION OF HIGH RISK PREGNANCY IN THIRD TRIMESTER: Primary | ICD-10-CM

## 2022-08-04 DIAGNOSIS — Z3A.30 30 WEEKS GESTATION OF PREGNANCY: ICD-10-CM

## 2022-08-04 PROCEDURE — 99207 PR PRENATAL VISIT: CPT | Performed by: OBSTETRICS & GYNECOLOGY

## 2022-08-04 NOTE — PROGRESS NOTES
Doing well today.  +FM  No ctx/cramping/vb/spotting  Overall feeling well  No more GERD or epigastric pain  Had normal repeat glucola and CBC last visit  RTC 2wks --seeing Dr. Vargas in my absence; will have growth us the following visit (34wks) due to BMI

## 2022-08-19 ENCOUNTER — PRENATAL OFFICE VISIT (OUTPATIENT)
Dept: OBGYN | Facility: CLINIC | Age: 37
End: 2022-08-19
Payer: COMMERCIAL

## 2022-08-19 VITALS — DIASTOLIC BLOOD PRESSURE: 64 MMHG | WEIGHT: 235.4 LBS | BODY MASS INDEX: 37.99 KG/M2 | SYSTOLIC BLOOD PRESSURE: 126 MMHG

## 2022-08-19 DIAGNOSIS — O99.213 OBESITY AFFECTING PREGNANCY IN THIRD TRIMESTER: ICD-10-CM

## 2022-08-19 DIAGNOSIS — O09.93 SUPERVISION OF HIGH RISK PREGNANCY IN THIRD TRIMESTER: Primary | ICD-10-CM

## 2022-08-19 PROCEDURE — 99207 PR PRENATAL VISIT: CPT | Performed by: OBSTETRICS & GYNECOLOGY

## 2022-08-20 NOTE — PROGRESS NOTES
Patient of Dr. Hunter that I'm seeing in her absence.  Feeling good FM and no significant BH  FH on previous visits was on track and today is 4cm larger. However some is body habitus of Abi as well.   Has a growth scan planned in 2 weeks due to h.o A1GDM with her last and BMI of 37 but overall has had very minimal weight gain of 3# and doing well  Reassured minimal weight gain is ok when starting weight is >200#  Return 2 wks

## 2022-09-03 ENCOUNTER — HEALTH MAINTENANCE LETTER (OUTPATIENT)
Age: 37
End: 2022-09-03

## 2022-09-06 ENCOUNTER — ANCILLARY PROCEDURE (OUTPATIENT)
Dept: ULTRASOUND IMAGING | Facility: CLINIC | Age: 37
End: 2022-09-06
Attending: OBSTETRICS & GYNECOLOGY
Payer: COMMERCIAL

## 2022-09-06 ENCOUNTER — PRENATAL OFFICE VISIT (OUTPATIENT)
Dept: OBGYN | Facility: CLINIC | Age: 37
End: 2022-09-06
Attending: OBSTETRICS & GYNECOLOGY

## 2022-09-06 VITALS — SYSTOLIC BLOOD PRESSURE: 108 MMHG | BODY MASS INDEX: 38.7 KG/M2 | WEIGHT: 239.8 LBS | DIASTOLIC BLOOD PRESSURE: 74 MMHG

## 2022-09-06 DIAGNOSIS — O09.93 SUPERVISION OF HIGH RISK PREGNANCY IN THIRD TRIMESTER: Primary | ICD-10-CM

## 2022-09-06 DIAGNOSIS — Z23 NEED FOR PROPHYLACTIC VACCINATION AND INOCULATION AGAINST INFLUENZA: ICD-10-CM

## 2022-09-06 DIAGNOSIS — Z3A.34 34 WEEKS GESTATION OF PREGNANCY: ICD-10-CM

## 2022-09-06 PROCEDURE — 99207 PR PRENATAL VISIT: CPT | Performed by: OBSTETRICS & GYNECOLOGY

## 2022-09-06 PROCEDURE — 90471 IMMUNIZATION ADMIN: CPT | Performed by: OBSTETRICS & GYNECOLOGY

## 2022-09-06 PROCEDURE — 76816 OB US FOLLOW-UP PER FETUS: CPT | Performed by: OBSTETRICS & GYNECOLOGY

## 2022-09-06 PROCEDURE — 90686 IIV4 VACC NO PRSV 0.5 ML IM: CPT | Performed by: OBSTETRICS & GYNECOLOGY

## 2022-09-06 NOTE — PROGRESS NOTES
Doing well today.  +FM  No ctx/cramping/vb/spotting; occ sharp shooting pains in the groin  No bowel/bladder issues  Growth us today wnl, vtx, EFW 2595g/5-12# (57%ile), MVP 7.1cm  Flu shot today  RTC 2wks

## 2022-09-15 ENCOUNTER — PRENATAL OFFICE VISIT (OUTPATIENT)
Dept: OBGYN | Facility: CLINIC | Age: 37
End: 2022-09-15
Payer: COMMERCIAL

## 2022-09-15 VITALS — WEIGHT: 240 LBS | DIASTOLIC BLOOD PRESSURE: 78 MMHG | SYSTOLIC BLOOD PRESSURE: 122 MMHG | BODY MASS INDEX: 38.74 KG/M2

## 2022-09-15 DIAGNOSIS — O09.93 SUPERVISION OF HIGH RISK PREGNANCY IN THIRD TRIMESTER: Primary | ICD-10-CM

## 2022-09-15 DIAGNOSIS — Z3A.36 36 WEEKS GESTATION OF PREGNANCY: ICD-10-CM

## 2022-09-15 DIAGNOSIS — Z23 COVID-19 VACCINE ADMINISTERED: ICD-10-CM

## 2022-09-15 PROCEDURE — 87653 STREP B DNA AMP PROBE: CPT | Performed by: OBSTETRICS & GYNECOLOGY

## 2022-09-15 PROCEDURE — 91312 COVID-19,PF,PFIZER BOOSTER BIVALENT: CPT | Performed by: OBSTETRICS & GYNECOLOGY

## 2022-09-15 PROCEDURE — 0124A COVID-19,PF,PFIZER BOOSTER BIVALENT: CPT | Performed by: OBSTETRICS & GYNECOLOGY

## 2022-09-15 PROCEDURE — 99207 PR PRENATAL VISIT: CPT | Performed by: OBSTETRICS & GYNECOLOGY

## 2022-09-15 NOTE — PROGRESS NOTES
Doing well today.  +FM  NO ctx/cramping/vb/spotting  A bit more pressure  No bowel/bladder issues  GBS collected today  Labor precautions reviewed  COVID booster today  RTC 1wk

## 2022-09-17 LAB — GP B STREP DNA SPEC QL NAA+PROBE: NEGATIVE

## 2022-09-19 ENCOUNTER — TELEPHONE (OUTPATIENT)
Dept: OBGYN | Facility: CLINIC | Age: 37
End: 2022-09-19

## 2022-09-19 ENCOUNTER — VIRTUAL VISIT (OUTPATIENT)
Dept: FAMILY MEDICINE | Facility: CLINIC | Age: 37
End: 2022-09-19
Payer: COMMERCIAL

## 2022-09-19 DIAGNOSIS — U07.1 INFECTION DUE TO 2019 NOVEL CORONAVIRUS: Primary | ICD-10-CM

## 2022-09-19 DIAGNOSIS — F33.0 MAJOR DEPRESSIVE DISORDER, RECURRENT EPISODE, MILD (H): ICD-10-CM

## 2022-09-19 DIAGNOSIS — O09.93 HIGH-RISK PREGNANCY, THIRD TRIMESTER: ICD-10-CM

## 2022-09-19 PROCEDURE — 96127 BRIEF EMOTIONAL/BEHAV ASSMT: CPT | Mod: 95 | Performed by: FAMILY MEDICINE

## 2022-09-19 PROCEDURE — 99213 OFFICE O/P EST LOW 20 MIN: CPT | Mod: CS | Performed by: FAMILY MEDICINE

## 2022-09-19 ASSESSMENT — PATIENT HEALTH QUESTIONNAIRE - PHQ9
SUM OF ALL RESPONSES TO PHQ QUESTIONS 1-9: 5
SUM OF ALL RESPONSES TO PHQ QUESTIONS 1-9: 5
10. IF YOU CHECKED OFF ANY PROBLEMS, HOW DIFFICULT HAVE THESE PROBLEMS MADE IT FOR YOU TO DO YOUR WORK, TAKE CARE OF THINGS AT HOME, OR GET ALONG WITH OTHER PEOPLE: SOMEWHAT DIFFICULT

## 2022-09-19 NOTE — TELEPHONE ENCOUNTER
Pt would like to talk to someone about covid. She had her booster when she was here last week and has felt sick since. She tested herself this morning and is positive for covid but is wondering if that's because she received the booster last week. Please call her back    9/15: Covid Booster,   9/16: Covid Symptoms   9/19: Covid Positive test     [employer] message sent with covid recommendations in pregnancy  Plan telephone visit tomorrow  Osito Paredes RN on 9/19/2022 at 11:32 AM

## 2022-09-19 NOTE — TELEPHONE ENCOUNTER
Dr Telles calling to confirm if Paxlovid is safe in pregnancy. Provider completed patients virtual visit for Covid. OBGYN confirmed treatment with Paxlovid is recommended in pregnancy.  Osito Paredes RN on 9/19/2022 at 4:31 PM

## 2022-09-19 NOTE — PROGRESS NOTES
1. Infection due to 2019 novel coronavirus  Patient was tested positive for COVID infection today at home.  Her symptoms started on 9/16/2022.  She is currently pregnant in her third trimester.  After reviewing the literature, going over the risks and benefit, I recommended Paxlovid.  Her OB clinic is also recommended that to her.  I tried to contact her OB/GYN but she was out of the clinic.  Spoke to her nurse and she tells me that Paxlovid is recommended to all pregnant patients from what she knows.  CDC website reviewed about that as well.  After discussing all that with the patient, she agrees to starting the medication.  Instructed to stay hydrated.  Eat a well-balanced diet.  Isolate yourself.  If any symptomatic worsening noted, instructed to go to the emergency room to seek medical attention immediately  - nirmatrelvir and ritonavir (PAXLOVID) therapy pack; Take 3 tablets by mouth 2 times daily for 5 days (Take 2 Nirmatrelvir tablets and 1 Ritonavir tablet twice daily for 5 days)  Dispense: 30 each; Refill: 0    2. High-risk pregnancy, third trimester      3. Major depressive disorder, recurrent episode, mild (H)          Abi is a 37 year old who is being evaluated via a billable video visit.      How would you like to obtain your AVS? MyChart  If the video visit is dropped, the invitation should be resent by: Text to cell phone: [phone]  Will anyone else be joining your video visit? No        Answers for HPI/ROS submitted by the patient on 9/19/2022  If you checked off any problems, how difficult have these problems made it for you to do your work, take care of things at home, or get along with other people?: Somewhat difficult  PHQ9 TOTAL SCORE: 5        Subjective   Abi is a 37 year old, presenting for the following health issues:  Covid Concern      HPI          COVID-19 Symptom Review    PT wants  to know that She is Pregnant     How many days ago did these symptoms start? 09/19/2022    Are  any of the following symptoms significant for you?    New or worsening difficulty breathing? No    Worsening cough? Yes, I am coughing up mucus.    Fever or chills? Yes, I felt feverish or had chills.    Headache: YES    Sore throat: YES    Chest pain: No    Diarrhea: No    Body aches? YES    What treatments has patient tried? Acetaminophen   Does patient live in a nursing home, group home, or shelter? No  Does patient have a way to get food/medications during quarantined? Yes, I have a friend or family member who can help me.            Review of Systems   CONSTITUTIONAL: NEGATIVE for fever, chills, change in weight  GI: NEGATIVE for nausea, abdominal pain, heartburn, or change in bowel habits      Objective           Vitals:  No vitals were obtained today due to virtual visit.    Physical Exam   GENERAL: Healthy, alert and no distress  EYES: Eyes grossly normal to inspection.  No discharge or erythema, or obvious scleral/conjunctival abnormalities.  RESP: No audible wheeze, cough, or visible cyanosis.  No visible retractions or increased work of breathing.    SKIN: Visible skin clear. No significant rash, abnormal pigmentation or lesions.  NEURO: Cranial nerves grossly intact.  Mentation and speech appropriate for age.  PSYCH: Mentation appears normal, affect normal/bright, judgement and insight intact, normal speech and appearance well-groomed.                Video-Visit Details    Video Start Time: 3:40 PM    Type of service:  Video Visit    Video End Time: 3:57 PM    Originating Location (pt. Location): Home    Distant Location (provider location):  [facility]     Platform used for Video Visit: [employer]

## 2022-09-20 ENCOUNTER — VIRTUAL VISIT (OUTPATIENT)
Dept: OBGYN | Facility: CLINIC | Age: 37
End: 2022-09-20
Payer: COMMERCIAL

## 2022-09-20 DIAGNOSIS — O09.93 SUPERVISION OF HIGH RISK PREGNANCY IN THIRD TRIMESTER: Primary | ICD-10-CM

## 2022-09-20 DIAGNOSIS — Z3A.36 36 WEEKS GESTATION OF PREGNANCY: ICD-10-CM

## 2022-09-20 DIAGNOSIS — U07.1 INFECTION DUE TO 2019 NOVEL CORONAVIRUS: ICD-10-CM

## 2022-09-20 PROCEDURE — 99207 PR PRENATAL VISIT: CPT | Performed by: OBSTETRICS & GYNECOLOGY

## 2022-09-20 NOTE — PROGRESS NOTES
PHONE VISIT  COVID positive on Monday --no sick contacts or known exposures; +congestion, +fatigue.  No SOB, fevers, etc.  Symptoms first started on Friday.  Masking at home and has home O2 sat monitor.  Started on paxlovid yesterday  Labor precautions reviewed  GBS negative last week  RTC 1wk

## 2022-09-21 ENCOUNTER — HOSPITAL ENCOUNTER (OUTPATIENT)
Facility: CLINIC | Age: 37
Discharge: HOME | End: 2022-09-21
Attending: OBSTETRICS & GYNECOLOGY | Admitting: OBSTETRICS & GYNECOLOGY
Payer: COMMERCIAL

## 2022-09-21 ENCOUNTER — NURSE TRIAGE (OUTPATIENT)
Dept: NURSING | Facility: CLINIC | Age: 37
End: 2022-09-21

## 2022-09-21 VITALS — TEMPERATURE: 98.8 F | SYSTOLIC BLOOD PRESSURE: 127 MMHG | OXYGEN SATURATION: 97 % | DIASTOLIC BLOOD PRESSURE: 83 MMHG

## 2022-09-21 DIAGNOSIS — U07.1 COVID-19: Primary | ICD-10-CM

## 2022-09-21 PROCEDURE — 59025 FETAL NON-STRESS TEST: CPT

## 2022-09-21 PROCEDURE — G0463 HOSPITAL OUTPT CLINIC VISIT: HCPCS | Mod: 25

## 2022-09-21 RX ORDER — PROCHLORPERAZINE MALEATE 5 MG
10 TABLET ORAL EVERY 6 HOURS PRN
Status: DISCONTINUED | OUTPATIENT
Start: 2022-09-21 | End: 2022-09-21 | Stop reason: HOSPADM

## 2022-09-21 RX ORDER — ONDANSETRON 4 MG/1
4 TABLET, ORALLY DISINTEGRATING ORAL EVERY 6 HOURS PRN
Status: DISCONTINUED | OUTPATIENT
Start: 2022-09-21 | End: 2022-09-21 | Stop reason: HOSPADM

## 2022-09-21 RX ORDER — ONDANSETRON 2 MG/ML
4 INJECTION INTRAMUSCULAR; INTRAVENOUS EVERY 6 HOURS PRN
Status: DISCONTINUED | OUTPATIENT
Start: 2022-09-21 | End: 2022-09-21 | Stop reason: HOSPADM

## 2022-09-21 RX ORDER — PROCHLORPERAZINE 25 MG
25 SUPPOSITORY, RECTAL RECTAL EVERY 12 HOURS PRN
Status: DISCONTINUED | OUTPATIENT
Start: 2022-09-21 | End: 2022-09-21 | Stop reason: HOSPADM

## 2022-09-21 RX ORDER — METOCLOPRAMIDE HYDROCHLORIDE 5 MG/ML
10 INJECTION INTRAMUSCULAR; INTRAVENOUS EVERY 6 HOURS PRN
Status: DISCONTINUED | OUTPATIENT
Start: 2022-09-21 | End: 2022-09-21 | Stop reason: HOSPADM

## 2022-09-21 RX ORDER — METOCLOPRAMIDE 10 MG/1
10 TABLET ORAL EVERY 6 HOURS PRN
Status: DISCONTINUED | OUTPATIENT
Start: 2022-09-21 | End: 2022-09-21 | Stop reason: HOSPADM

## 2022-09-21 RX ORDER — ONDANSETRON 4 MG/1
4-8 TABLET, FILM COATED ORAL EVERY 8 HOURS PRN
Qty: 10 TABLET | Refills: 0 | Status: SHIPPED | OUTPATIENT
Start: 2022-09-21 | End: 2022-10-03

## 2022-09-21 ASSESSMENT — ACTIVITIES OF DAILY LIVING (ADL): ADLS_ACUITY_SCORE: 35

## 2022-09-21 NOTE — PLAN OF CARE
[id number] 10/13/22-36+6-O pos-GBS negative-Covid positive.c/o firm abdomen-has been having N/V today. EFM applied.  -Dr ferreira updated-continue to observe-have patient drink apple juice/crackers-o2 sat at 98%.  -Report given to next shift.

## 2022-09-21 NOTE — TELEPHONE ENCOUNTER
"Patient is 37 weeks pregnant and is positive for Covid-19 on 22    Patient is on the Plaxlovid for Covid-19 and  OB Triage Call      Is patient's OB/Midwife with the [facility] or [facility]? [facility]- Proceed with triage     Reason for call: Vomiting with stomach hardening and decreased fetal movement    Assessment: Patient has Covid-19    Plan: Will call out to L&D for an update on patients pending arrival    Patient plans to deliver at [facility]    Patient's primary OB Provider is [facility].      Per protocol recommendations Patient to be evaluated in L&D. Patient's primary OB is [facility].  Labor and delivery at [facility] ([phone]) notified of patient's pending arrival.  Report given to Mari.      Is patient's delivering hospital on divert? No      36w6d    Estimated Date of Delivery: Oct 13, 2022        OB History    Para Term  AB Living   2 1 1 0 0 1   SAB IAB Ectopic Multiple Live Births   0 0 0 0 1      # Outcome Date GA Lbr Benedict/2nd Weight Sex Delivery Anes PTL Lv   2 Current            1 Term 10/03/16 40w5d 07:00 / 03:17 3.55 kg (7 lb 13.2 oz) M Vag-Spont EPI N JUJU      Name: Kael      Apgar1: 8  Apgar5: 9       Lab Results   Component Value Date    GBS  2016     Negative  No GBS DNA detected, presumed negative for GBS or number of bacteria may be   below the limit of detection of the assay.   Assay performed on incubated broth culture of specimen using [employer] real-time   PCR.            Jennie Frias RN 22 5:43 PM  [facility] Nurse Advisor symptoms started five days ago    Today symptoms has vomited twice and vomit is brownish in color with undigested food    Patient is complaining of a hard stomach for over an hour and is uncomfortable    Patient says she is unable to feel fetal movement due to the hard stomach    Patient says \"I don't think it is a contraction, but my stomach remains hard and uncomfortable\"    Triage guidelines recommend " "to go to L&D now    Triager called out to [facility] L&D and spoke with Mari regarding guideline. She spoke with her charge nurse and they advised for patient to be seen in ED    Triager advised patient to go to [facility] ED for evaluation per Mari, and advised patient they will come down to ED to assess her as well per Mari  Caller verbalized and understands directives    Reason for Disposition    MODERATE-SEVERE abdominal pain (e.g., interferes with normal activities, awakens from sleep)    [1] Baby moving less today (e.g., kick count < 5 in 1 hour or < 10 in 2 hours) AND [2] pregnant 23 or more weeks    Additional Information    Negative: Passed out (i.e., lost consciousness, collapsed and was not responding)    Negative: Shock suspected (e.g., cold/pale/clammy skin, too weak to stand, low BP, rapid pulse)    Negative: Difficult to awaken or acting confused (e.g., disoriented, slurred speech)    Negative: [1] SEVERE abdominal pain (e.g., excruciating) AND [2] constant AND [3] present > 1 hour    Negative: SEVERE vaginal bleeding (e.g., continuous red blood from vagina, large blood clots)    Negative: Sounds like a life-threatening emergency to the triager    Negative: Followed an abdomen (stomach) injury    Negative: [1] Having contractions or other symptoms of labor (such as vaginal pressure) AND [2] 37 or more weeks pregnant (i.e., term pregnancy)    Negative: [1] Having contractions or other symptoms of labor (such as vaginal pressure) AND [2] < 37 weeks pregnant (i.e., )    Negative: [1] Abdominal pain AND [2] pregnant < 20 weeks    Negative: [1] Vomiting AND [2] contains red blood or black (\"coffee ground\") material  (Exception: few red streaks in vomit that only happened once)    Negative: [1] SEVERE headache AND [2] not relieved with acetaminophen (e.g., Tylenol)    Negative: Vaginal bleeding or spotting    Negative: Leakage of fluid from vagina    Negative: New hand or face swelling    Negative: " New blurred vision or vision changes    Negative: [1] Upper abdominal pain AND [2] Systolic BP >= 140 OR Diastolic BP >= 90    Protocols used: PREGNANCY - ABDOMINAL PAIN GREATER THAN 20 WEEKS EGA-A-

## 2022-09-22 NOTE — PROVIDER NOTIFICATION
09/21/22 2000   Provider Notification   Provider Name/Title Dr Abdi   Method of Notification Phone   Request Evaluate - Remote   Notification Reason Status Update     Report to Dr Abdi to include , +mod variability, +accels, -decels. Fhr was min variability -accels -decels when first on monitor, but improved after pt ate some pudding and drank apple juice. Some contractions on toco, but patient not feeling. O2 sats 97-99%. Pt does not feel like she is in labor, will not do SVE. Concerned about nausea with covid meds, will send rx for zofran to 24 hour pharmacy, promote hydration and BRAT diet. Pt may discharge to home.   Pt and spouse agreeable to POC for discharge. All questions answered. Discharged stable and ambulatory at 2029.

## 2022-09-22 NOTE — PROVIDER NOTIFICATION
09/21/22 1907   Provider Notification   Provider Name/Title Trinidad   Method of Notification Phone   Request Evaluate - Remote   Notification Reason Patient Arrived;Uterine Activity;Status Update

## 2022-09-22 NOTE — DISCHARGE INSTRUCTIONS
Discharge Instruction for Undelivered Patients      You were seen for: Labor Assessment, Fetal Assessment  We Consulted: Dr Abdi  You had (Test or Medicine):NST, oxygen monitoring      Diet:   Drink 8 to 12 glasses of liquids (milk, juice, water) every day.  You may eat meals and snacks.     Activity:  Count fetal kicks everyday (see handout)  Call your doctor or nurse midwife if your baby is moving less than usual.     Call your provider if you notice:  Swelling in your face or increased swelling in your hands or legs.  Headaches that are not relieved by Tylenol (acetaminophen).  Changes in your vision (blurring: seeing spots or stars.)  Nausea (sick to your stomach) and vomiting (throwing up).   Weight gain of 5 pounds or more per week.  Heartburn that doesn't go away.  Signs of bladder infection: pain when you urinate (use the toilet), need to go more often and more urgently.  The bag of rosenthal (rupture of membranes) breaks, or you notice leaking in your underwear.  Bright red blood in your underwear.  Abdominal (lower belly) or stomach pain.  Second (plus) baby: Contractions (tightening) less than 10 minutes apart and getting stronger.  Increase or change in vaginal discharge (note the color and amount)  Other: ***    Follow-up:  As scheduled in the clinic        Coronavirus Disease 2019 (COVID-19): Caring for Yourself or Others  If you or a household member test positive for COVID-19 or have symptoms like fever, cough, fatigue, loss of taste or smell, follow these guidelines for preventing spread of the virus and managing symptoms. This is regardless ofyour vaccination status.  If you have COVID-19 symptoms  Stay home. Call your healthcare provider and tell them you have COVID-19 symptoms or you have tested positive for COVID-19. Do this before going to any hospital or clinic. Follow your provider's instructions. You will be advised to isolate yourself at home. This is called self-isolation. Isolate even if  you don't have COVID-19 symptoms but you test positive. See the CDC's website for current, detailed information about staying home. Also, follow your local area's instructions on testing and staying home.  Stay away from work, school, and public places. Limit physical contact with family members. Limit visitors. Don't kiss anyone or share eating or drinking utensils. Clean surfaces you touch with disinfectant. This is to help prevent the virus from spreading.  If you need to cough or sneeze, do it into a tissue. Then throw the tissue into the trash. If you don't have tissues, cough or sneeze into the bend of your elbow.  Wear a mask with the best fit, protection, and comfort for you.  Don t share food or personal items with people in your household. This includes items like eating and drinking utensils, towels, and bedding.  If you need to go to a hospital or clinic, expect that the healthcare staff will wear protective equipment such as masks, gowns, gloves, and eye protection. You may be advised to wait in or enter through a separate area. This is to prevent the possible virus from spreading.  Tell the healthcare staff about recent travel. This includes local travel on public transport. Staff may need to find other people you have been in contact with.  Follow all instructions the healthcare staff give you.    If you have been diagnosed with COVID-19  Stay home and isolate. Don t leave your home unless you need to get medical care. Don't go to work, school, or public areas. Don't use public transportation or taxis.  Follow all instructions from your healthcare provider. Call your healthcare provider s office before going. They can prepare and give you instructions. This will help prevent the virus from spreading.  If you need to go to a hospital or clinic, expect that the healthcare staff will wear protective equipment such as masks, gowns, gloves, and eye protection. You may be advised to wait in or enter through  a separate area. This is to prevent the possible virus from spreading.  Wear a mask with the best fit, protection, and comfort for you.  Stay away from other people in your home. Stay in a separate room with its own bathroom, if possible.  Stay away from pets and other animals.  Don t share food or personal items with people in your household. This includes items like eating and drinking utensils, towels, and bedding.  If you need to cough or sneeze, do it into a tissue. Then throw the tissue into the trash. If you don't have tissues, cough or sneeze into the bend of your elbow.  Wash your hands often.    Self-care at home   Prevention  Several vaccines and booster shots are available to prevent COVID-19 or reduce its severity. These vaccines reduce how severe the illness will be if you get the virus. No vaccine is ever 100% effective in preventing any illness, but the COVID-19 vaccines work well and are safe. One vaccine is available for people as young as 5. Booster shots are available for people as young as 12. Expert groups, including ACOG and CDC, advise pregnant or breastfeeding people to be vaccinated. Talk with your healthcare provider about which vaccine is best foryou and your family and when you may need a booster shot.  Treatment  Current treatment is mainly aimed at helping your body while it fights the virus. This is known as supportive care. If you have confirmed COVID-19, talk with your healthcare provider. You may qualify for certain medicines approvedby the FDA to prevent severe COVID-19 infection.  For serious COVID-19, you may need to stay in the hospital. Supportive careincludes:  Getting rest. This helps your body fight the illness.  Staying hydrated. Drinking liquids is the best way to prevent dehydration. Try to drink 6 to 8 glasses of liquids every day, or as advised by your provider. Also check with your provider about which fluids are best for you. Don't drink fluids that contain caffeine  or alcohol.  Taking over-the-counter (OTC) pain medicine. These are used to help ease pain and reduce fever. Follow your healthcare provider's instructions for which OTC medicine to use.  If you've been treated for suspected or confirmed COVID-19, follow all of your healthcare team's instructions. This will include when it's OK to stop self-isolation. You may also get instructions on position changes to help your breathing, such as lying on your belly (prone positioning). If you were treated at a hospital and discharged, you may be sent home with a pulse oximeter. This is a small electronic device that you clip on your fingertip. It measures the amount of oxygen in your body. Follow your healthcare team's instructions on its use, how they will be in touch with you,and let you know when to call them.  The FDA approved monoclonal antibody therapy for emergency investigational use in certain people who have a positive COVID-19 viral test and have mild to moderate symptoms but are not in the hospital. Your healthcare provider willadvise you on whether monoclonal antibody therapy is appropriate for you.  If you've had confirmed COVID-19, your healthcare team may ask you to consider donating your plasma. This is called COVID-19 convalescent plasma donation. Plasma from people fully recovered from COVID-19 may contain antibodies to help fight COVID-19 in people who are currently seriously ill with the disease. Experts don't know the safety of COVID-19 convalescent plasma or how well it works. Research continues. The FDA has approved it for emergency use in certainpeople with serious or life-threatening COVID-19.  Home care for a sick person   Follow all instructions from healthcare staff.  Wash your hands often.  Wear protective clothing as advised. Wear a mask when caring for someone with COVID-19.  Make sure the sick person wears a mask. If they can't wear a mask, don't stay in the same room with the person. If you must  be in the same room, wear a face mask. When wearing a mask, make sure that it covers both the nose and mouth.  Keep track of the sick person s symptoms.  Clean home surfaces often with disinfectant. This includes phones, kitchen counters, fridge door handle, bathroom surfaces, and others.  Don t let anyone share household items with the sick person. This includes eating and drinking tools, towels, sheets, or blankets.  Clean fabrics and laundry thoroughly.  Keep other people and pets away from the sick person.    When you can stop isolation  When you have COVID-19, with or without symptoms, you should stay away fromother people. This is called isolation.  Everyone who has presumed or confirmed COVID-19 should stay home and isolate from other people for at least 5 full days. Day 0 is the first day of symptoms or the day you had a positive COVID-19 test. You can end isolation after 5 fulldays if:     You are fever-free for 24 hours without using fever-reducing medicines such as acetaminophen, and  Your other symptoms such as cough or trouble breathing are better     If you tested positive (even without symptoms), wear a well-fitting mask 5 more days after leaving isolation. This is day 6through day 10.  If you have a weak immune system and COVID-19, or if you've had severe COVID-19, your instructions on when to stop isolation will be somewhat different. Some conditions and treatments can cause a weak immune system. These include cancer treatment, bone marrow or organ transplants, and conditions such as HIV or other immune system disorders. You may be advised to self-isolate up to 20 days after your symptoms first started. Your healthcare provider may wantto retest you for COVID-19. Follow your provider's instructions.  See the CDC's isolation guidance.  CDC mask guidance     Follow the CDC's guidance and your local community's instructions on face masks. Everyone ages 2 years and older should correctly wear a  well-fitting mask indoors in public in areas where the COVID-19 Community Level is high, even if you are fully vaccinated.  Wear a mask with the best fit, protection, and comfort for you.  You may choose to wear a mask that offers greater protection in certain situations, such as when you are with people at higher risk for severe illness, or if you are at higher risk for severe illness.  See the CDC's mask guidance.     When to call your healthcare provider  Call your healthcare provider right away if a sick person has any of these:  Trouble breathing  Pain or pressure in chest  If a sick person has any of these, call 911:  Trouble breathing that gets worse  Pain or pressure in chest that gets worse  Blue tint to lips or face  Fast or irregular heartbeat  Confusion or trouble waking  Fainting or loss of consciousness  Coughing up blood  Going home from the hospital  If you were diagnosed with COVID-19 and were recently discharged from thespital:  Follow the instructions above for self-care and isolation.  Follow the hospital healthcare team s specific instructions.  Ask questions if anything is unclear to you. Write down answers so you remember them.  Date last modified: 3/3/2022  Anthony last reviewed this educational content on12/1/2021 2000-2022 [employer]. All rights reserved. This information is not intended as a substitute for professional medical care. Always follow yourhealthcare professional's instructions.        Sharkey Diet  Your healthcare provider may recommend a bland diet if you have an upset stomach. It consists of foods that are mild and easy to digest. It is better to eat small frequent meals rather than 3 large meals a day.    Beverages  OK: Fruit juices, non-caffeinated teas and coffee, non-carbonated rosenthal  Avoid: Carbonated beverage, caffeinated tea and coffee, all alcoholic beverages  Bread  OK: Refined white, wheat or rye bread, sunita or soda crackers, Hyacinth toast,  "plain rolls, bagels  Avoid: Whole-grain bread  Cereal  OK: Refined cereals: cooked or ready to eat  Avoid: Whole-grain cereals and granola, or those containing bran, seeds or nuts  Desserts  OK: Peanut butter and all others except those to \"avoid\"  Avoid: Chocolate, cocoa, coconut, popcorn, nuts, seeds, jam, marmalade  Fruits  OK: Canned, cooked, frozen or fresh fruits without seeds or tough skin  Avoid: Olives, skin and seeds of fruit, dried fruit  Meats  OK: All fresh or preserved meat, fish and fowl  Avoid: Any that are prepared with those spices to \"avoid\"  Cheese and eggs  OK: Eggs, cottage cheese, cream cheese, other cheeses  Avoid: All cheeses made with those spices to \"avoid\"  Potatoes and pasta  OK: Potato, rice, macaroni, noodles, spaghetti  Avoid: None  Soups  OK: All soups without heavy seasoning  Avoid: Soups made with those spices to \"avoid\"  Vegetables  OK: Canned, cooked, fresh or frozen mildly flavored vegetables without seeds, skins or coarse fiber  Avoid: Vegetables prepared with those spices to \"avoid\"; skin and seeds of vegetables and those with coarse fiber, broccoli, cabbage, cauliflower, cucumber, green peppers, and corn  Spices  OK: Salt, lemon and limejuice, vinegar, all extracts, arlyn, cinnamon, thyme, mace, allspice, paprika  Avoid: Chili powder, cloves, pepper, seed spices, garlic, gravy pickles, highly seasoned salad dressings  Anthony last reviewed this educational content on 5/1/2018 2000-2021 [employer]. All rights reserved. This information is not intended as a substitute for professional medical care. Always follow your healthcare professional's instructions.          "

## 2022-09-27 ENCOUNTER — PRENATAL OFFICE VISIT (OUTPATIENT)
Dept: OBGYN | Facility: CLINIC | Age: 37
End: 2022-09-27
Payer: COMMERCIAL

## 2022-09-27 VITALS — WEIGHT: 239 LBS | DIASTOLIC BLOOD PRESSURE: 86 MMHG | BODY MASS INDEX: 38.58 KG/M2 | SYSTOLIC BLOOD PRESSURE: 130 MMHG

## 2022-09-27 DIAGNOSIS — Z86.16 PERSONAL HISTORY OF COVID-19: ICD-10-CM

## 2022-09-27 DIAGNOSIS — Z3A.37 37 WEEKS GESTATION OF PREGNANCY: ICD-10-CM

## 2022-09-27 DIAGNOSIS — O09.93 SUPERVISION OF HIGH RISK PREGNANCY IN THIRD TRIMESTER: Primary | ICD-10-CM

## 2022-09-27 PROCEDURE — 99207 PR PRENATAL VISIT: CPT | Performed by: OBSTETRICS & GYNECOLOGY

## 2022-09-27 NOTE — PROGRESS NOTES
Doing well today.  +FM  No ctx/cramping/vb/spotting; much more pressure this week --uncomfortable with any standing, walking, etc  Very ready to be done!!  Recovered from covid --still slight congestion but feeling much better; no one else at home positive  Still occ nausea --may be related to breaded chicken?  Some constipation --will start colace and continue until delivery  Membrane stripping today  Labor precautions reviewed  Elective IOL scheduled with me next Thursday 10/6 at 7:30AM

## 2022-10-02 ENCOUNTER — NURSE TRIAGE (OUTPATIENT)
Dept: NURSING | Facility: CLINIC | Age: 37
End: 2022-10-02

## 2022-10-02 ENCOUNTER — HOSPITAL ENCOUNTER (OUTPATIENT)
Facility: CLINIC | Age: 37
Discharge: HOME | End: 2022-10-02
Attending: OBSTETRICS & GYNECOLOGY | Admitting: OBSTETRICS & GYNECOLOGY
Payer: COMMERCIAL

## 2022-10-02 ENCOUNTER — APPOINTMENT (OUTPATIENT)
Dept: ULTRASOUND IMAGING | Facility: CLINIC | Age: 37
End: 2022-10-02
Attending: OBSTETRICS & GYNECOLOGY
Payer: COMMERCIAL

## 2022-10-02 VITALS
OXYGEN SATURATION: 99 % | SYSTOLIC BLOOD PRESSURE: 128 MMHG | DIASTOLIC BLOOD PRESSURE: 83 MMHG | RESPIRATION RATE: 18 BRPM

## 2022-10-02 LAB
ABO/RH(D): NORMAL
ALT SERPL W P-5'-P-CCNC: 50 U/L (ref 0–50)
AMYLASE SERPL-CCNC: 77 U/L (ref 30–110)
ANTIBODY SCREEN: NEGATIVE
AST SERPL W P-5'-P-CCNC: 31 U/L (ref 0–45)
BASOPHILS # BLD AUTO: 0 10E3/UL (ref 0–0.2)
BASOPHILS NFR BLD AUTO: 0 %
CREAT SERPL-MCNC: 0.96 MG/DL (ref 0.52–1.04)
CREAT UR-MCNC: 247 MG/DL
EOSINOPHIL # BLD AUTO: 0 10E3/UL (ref 0–0.7)
EOSINOPHIL NFR BLD AUTO: 0 %
ERYTHROCYTE [DISTWIDTH] IN BLOOD BY AUTOMATED COUNT: 12.9 % (ref 10–15)
GFR SERPL CREATININE-BSD FRML MDRD: 79 ML/MIN/1.73M2
HCT VFR BLD AUTO: 38.8 % (ref 35–47)
HGB BLD-MCNC: 13.2 G/DL (ref 11.7–15.7)
IMM GRANULOCYTES # BLD: 0.1 10E3/UL
IMM GRANULOCYTES NFR BLD: 1 %
LIPASE SERPL-CCNC: 198 U/L (ref 73–393)
LYMPHOCYTES # BLD AUTO: 1.9 10E3/UL (ref 0.8–5.3)
LYMPHOCYTES NFR BLD AUTO: 18 %
MCH RBC QN AUTO: 30.7 PG (ref 26.5–33)
MCHC RBC AUTO-ENTMCNC: 34 G/DL (ref 31.5–36.5)
MCV RBC AUTO: 90 FL (ref 78–100)
MONOCYTES # BLD AUTO: 0.6 10E3/UL (ref 0–1.3)
MONOCYTES NFR BLD AUTO: 6 %
NEUTROPHILS # BLD AUTO: 7.7 10E3/UL (ref 1.6–8.3)
NEUTROPHILS NFR BLD AUTO: 75 %
NRBC # BLD AUTO: 0 10E3/UL
NRBC BLD AUTO-RTO: 0 /100
PLATELET # BLD AUTO: 286 10E3/UL (ref 150–450)
PROT UR-MCNC: 0.66 G/L
PROT/CREAT 24H UR: 0.27 G/G CR (ref 0–0.2)
RBC # BLD AUTO: 4.3 10E6/UL (ref 3.8–5.2)
SPECIMEN EXPIRATION DATE: NORMAL
URATE SERPL-MCNC: 5.5 MG/DL (ref 2.6–6)
WBC # BLD AUTO: 10.3 10E3/UL (ref 4–11)

## 2022-10-02 PROCEDURE — 36415 COLL VENOUS BLD VENIPUNCTURE: CPT | Performed by: OBSTETRICS & GYNECOLOGY

## 2022-10-02 PROCEDURE — 84156 ASSAY OF PROTEIN URINE: CPT | Performed by: OBSTETRICS & GYNECOLOGY

## 2022-10-02 PROCEDURE — 82150 ASSAY OF AMYLASE: CPT | Performed by: OBSTETRICS & GYNECOLOGY

## 2022-10-02 PROCEDURE — 59025 FETAL NON-STRESS TEST: CPT

## 2022-10-02 PROCEDURE — 258N000003 HC RX IP 258 OP 636: Performed by: OBSTETRICS & GYNECOLOGY

## 2022-10-02 PROCEDURE — 96360 HYDRATION IV INFUSION INIT: CPT

## 2022-10-02 PROCEDURE — 999N000128 HC STATISTIC PERIPHERAL IV START W/O US GUIDANCE

## 2022-10-02 PROCEDURE — 84450 TRANSFERASE (AST) (SGOT): CPT | Performed by: OBSTETRICS & GYNECOLOGY

## 2022-10-02 PROCEDURE — G0463 HOSPITAL OUTPT CLINIC VISIT: HCPCS | Mod: 25

## 2022-10-02 PROCEDURE — 83690 ASSAY OF LIPASE: CPT | Performed by: OBSTETRICS & GYNECOLOGY

## 2022-10-02 PROCEDURE — 86780 TREPONEMA PALLIDUM: CPT | Performed by: OBSTETRICS & GYNECOLOGY

## 2022-10-02 PROCEDURE — 85025 COMPLETE CBC W/AUTO DIFF WBC: CPT | Performed by: OBSTETRICS & GYNECOLOGY

## 2022-10-02 PROCEDURE — 82565 ASSAY OF CREATININE: CPT | Performed by: OBSTETRICS & GYNECOLOGY

## 2022-10-02 PROCEDURE — 84460 ALANINE AMINO (ALT) (SGPT): CPT | Performed by: OBSTETRICS & GYNECOLOGY

## 2022-10-02 PROCEDURE — 84550 ASSAY OF BLOOD/URIC ACID: CPT | Performed by: OBSTETRICS & GYNECOLOGY

## 2022-10-02 PROCEDURE — 86901 BLOOD TYPING SEROLOGIC RH(D): CPT | Performed by: OBSTETRICS & GYNECOLOGY

## 2022-10-02 PROCEDURE — 76705 ECHO EXAM OF ABDOMEN: CPT

## 2022-10-02 PROCEDURE — 96361 HYDRATE IV INFUSION ADD-ON: CPT | Mod: 59

## 2022-10-02 RX ORDER — FENTANYL CITRATE 50 UG/ML
INJECTION, SOLUTION INTRAMUSCULAR; INTRAVENOUS
Status: DISCONTINUED
Start: 2022-10-02 | End: 2022-10-02 | Stop reason: WASHOUT

## 2022-10-02 RX ORDER — FENTANYL CITRATE 50 UG/ML
100 INJECTION, SOLUTION INTRAMUSCULAR; INTRAVENOUS ONCE
Status: DISCONTINUED | OUTPATIENT
Start: 2022-10-02 | End: 2022-10-02 | Stop reason: HOSPADM

## 2022-10-02 RX ORDER — ONDANSETRON 2 MG/ML
4 INJECTION INTRAMUSCULAR; INTRAVENOUS ONCE
Status: DISCONTINUED | OUTPATIENT
Start: 2022-10-02 | End: 2022-10-02 | Stop reason: HOSPADM

## 2022-10-02 RX ORDER — LIDOCAINE 40 MG/G
CREAM TOPICAL
Status: DISCONTINUED | OUTPATIENT
Start: 2022-10-02 | End: 2022-10-02 | Stop reason: HOSPADM

## 2022-10-02 RX ORDER — ONDANSETRON 2 MG/ML
INJECTION INTRAMUSCULAR; INTRAVENOUS
Status: DISCONTINUED
Start: 2022-10-02 | End: 2022-10-02 | Stop reason: WASHOUT

## 2022-10-02 RX ORDER — SODIUM CHLORIDE, SODIUM LACTATE, POTASSIUM CHLORIDE, CALCIUM CHLORIDE 600; 310; 30; 20 MG/100ML; MG/100ML; MG/100ML; MG/100ML
INJECTION, SOLUTION INTRAVENOUS CONTINUOUS
Status: DISCONTINUED | OUTPATIENT
Start: 2022-10-02 | End: 2022-10-02 | Stop reason: HOSPADM

## 2022-10-02 RX ADMIN — SODIUM CHLORIDE, POTASSIUM CHLORIDE, SODIUM LACTATE AND CALCIUM CHLORIDE: 600; 310; 30; 20 INJECTION, SOLUTION INTRAVENOUS at 12:48

## 2022-10-02 ASSESSMENT — ACTIVITIES OF DAILY LIVING (ADL)
ADLS_ACUITY_SCORE: 31
ADLS_ACUITY_SCORE: 35
ADLS_ACUITY_SCORE: 31

## 2022-10-02 NOTE — DISCHARGE INSTRUCTIONS
Discharge Instruction for Undelivered Patients      You were seen for:  Pain assessment  We Consulted: Dr Vargas  You had (Test or Medicine): fetal and uterine monitoring, labs, ultrasound     Diet:   Drink 8 to 12 glasses of liquids (milk, juice, water) every day.  You may eat meals and snacks. Avoid fatty foods     Activity:  Call your doctor or nurse midwife if your baby is moving less than usual.     Call your provider if you notice:  Swelling in your face or increased swelling in your hands or legs.  Headaches that are not relieved by Tylenol (acetaminophen).  Changes in your vision (blurring: seeing spots or stars.)  Nausea (sick to your stomach) and vomiting (throwing up).   Weight gain of 5 pounds or more per week.  Heartburn that doesn't go away.  Signs of bladder infection: pain when you urinate (use the toilet), need to go more often and more urgently.  The bag of rosenthal (rupture of membranes) breaks, or you notice leaking in your underwear.  Bright red blood in your underwear.  Abdominal (lower belly) or stomach pain.  For first baby: Contractions (tightening) less than 5 minutes apart for one hour or more.  Second (plus) baby: Contractions (tightening) less than 10 minutes apart and getting stronger.  Increase or change in vaginal discharge (note the color and amount)      Follow-up:  Tomorrow 10/3 for blood pressure check

## 2022-10-02 NOTE — PROGRESS NOTES
[facility]:    HPI: Abi  36 yo  at 38+3wga comes in with sudden onset recurrent epigastric to upper abdominal pain. She has had similar pain earlier in pregnancy but this is more intense. She was found to have elevated blood pressures at home with diastolics in the 100s. Here her blood pressures are in the mildly elevated range. Prenatal care is with Dr. Zonia Hunter.    Vitals:    10/02/22 1045 10/02/22 1100   BP: (!) 138/91 (!) 143/80   Resp: 16    SpO2:  99%       GEN: Sitting up in bed and wincing from pain  CV: RRR  RESP: CTAB  GI: Some epigastric tenderness to palpation.   Reflexes: 1+  FHT: 130bpm baseline/ + accels/ no decels/ mod allan  [place]: none  SVE: 1.5/50/-3 posterior    Bedside sonogram done to assist in fetal monitoring    A/P 36 yo  at 38+3 with sudden onset epigastric abdominal pain worse than pain earlier in pregnancy with mildly elevated blood pressures    --Initial labs show a high normal ALT. Normal CBC with no thrombocytopenia. Awaiting P/C ratio. Awaiting Amylase Lipase. Gall bladder sonogram cannot be done until 1530 due to meal at 730. Will treat pain and nausea while waiting. Given hypertension after 37 weeks I recommend induction of labor.    Rebecca Vargas MD

## 2022-10-02 NOTE — PROVIDER NOTIFICATION
10/02/22 1145   Provider Notification   Provider Name/Title Sam   Method of Notification At Bedside   Request Evaluate in Person    At bedside to assess. U/S for FHT's and position. POC reviewed with pt. Await lab results. Fetal monitor reapplied and adjusted. Continued support given.

## 2022-10-02 NOTE — PROVIDER NOTIFICATION
10/02/22 1107   Provider Notification   Provider Name/Title Giuseppejavier   Method of Notification Electronic Page   Immediate return of page. Updated on pt arrival,  38+3 week pt of Dr. Hunter with epigastric pain -no relief from pepcid and back pain this am, BP's at home, BP's here, denies HA or visual problems, SVE 1-2cm/50% posterior, occational UC, unable to monitor baby with continuous strip, O2 sat %. Orders given for pre-E labs, urine for protein, and possible admission.  Will be over to see pt.  POC reviewed with pt and support. Emesis x2 while nurse out of room. Continued support given.   Fetal monitor held in place. Pt sitting high fowlers.  1130- lab here to draw blood.

## 2022-10-02 NOTE — PLAN OF CARE
Updated Dr Vargas on pt status, viewed ultrasound results and labs. Pt's pain and nausea resolved. BP better once pain gone. Plan to return to clinic tomorrow for BP check, also has appointment scheduled for Tuesday. Induction scheduled for Thursday. No other pre-eclampsia symptoms. Reviewed with pt pre-e symptoms to watch for, low fat diet, reasons to call provider. Questions answered. IV removed. Discharge to home with friend.

## 2022-10-02 NOTE — TELEPHONE ENCOUNTER
OB Triage Call      Is patient's OB/Midwife with the [facility] or [facility]? [facility]- Proceed with triage     Reason for call: abdominal pain     Assessment: the pain is epigastric right at the sternal area. The pain started this am around 8am.     She also had the pain yesterday from 6 pm until midnight.    The pain radiates to her back.     Being upright is helping than lying down    The pain is constant since starting.    She is having nausea    Has some issues with constipation but did have a BM today    Did try - Pepcid and that didn't help- also tried heat and that has not helped    Denies any diarrhea     Denies any fever    Denies any painful urination     Baby is moving fine    This is keeping her from sleeping    123/100    148/100      Plan: patient to go to labor and delivery    Patient plans to deliver at [facility]    Patient's primary OB Provider is Dale.      Per protocol recommendations Patient to be evaluated in L&D. Patient's primary OB is [facility].  Labor and delivery at [facility] ([phone]) notified of patient's pending arrival.  Report given to Charge Nurse.      Is patient's delivering hospital on divert? No      38w3d    Estimated Date of Delivery: Oct 13, 2022        OB History    Para Term  AB Living   2 1 1 0 0 1   SAB IAB Ectopic Multiple Live Births   0 0 0 0 1      # Outcome Date GA Lbr Benedict/2nd Weight Sex Delivery Anes PTL Lv   2 Current            1 Term 10/03/16 40w5d 07:00 / 03:17 3.55 kg (7 lb 13.2 oz) M Vag-Spont EPI N JUJU      Name: Kael      Apgar1: 8  Apgar5: 9       Lab Results   Component Value Date    GBS  2016     Negative  No GBS DNA detected, presumed negative for GBS or number of bacteria may be   below the limit of detection of the assay.   Assay performed on incubated broth culture of specimen using Flavourly real-time   PCR.            Katie Taylor RN 10/02/22 9:27 AM  [facility] Nurse Advisor    Reason for  "Disposition    [1] Upper abdominal pain AND [2] Systolic BP >= 140 OR Diastolic BP >= 90    Additional Information    Negative: Passed out (i.e., lost consciousness, collapsed and was not responding)    Negative: Shock suspected (e.g., cold/pale/clammy skin, too weak to stand, low BP, rapid pulse)    Negative: Difficult to awaken or acting confused (e.g., disoriented, slurred speech)    Negative: [1] SEVERE abdominal pain (e.g., excruciating) AND [2] constant AND [3] present > 1 hour    Negative: SEVERE vaginal bleeding (e.g., continuous red blood from vagina, large blood clots)    Negative: Sounds like a life-threatening emergency to the triager    Negative: Followed an abdomen (stomach) injury    Negative: [1] Having contractions or other symptoms of labor (such as vaginal pressure) AND [2] 37 or more weeks pregnant (i.e., term pregnancy)    Negative: [1] Having contractions or other symptoms of labor (such as vaginal pressure) AND [2] < 37 weeks pregnant (i.e., )    Negative: [1] Abdominal pain AND [2] pregnant < 20 weeks    Negative: [1] Vomiting AND [2] contains red blood or black (\"coffee ground\") material  (Exception: few red streaks in vomit that only happened once)    Negative: [1] SEVERE headache AND [2] not relieved with acetaminophen (e.g., Tylenol)    Negative: MODERATE-SEVERE abdominal pain (e.g., interferes with normal activities, awakens from sleep)    Negative: Vaginal bleeding or spotting    Negative: [1] Baby moving less today (e.g., kick count < 5 in 1 hour or < 10 in 2 hours) AND [2] pregnant 23 or more weeks    Negative: Leakage of fluid from vagina    Negative: New hand or face swelling    Negative: New blurred vision or vision changes    Protocols used: PREGNANCY - ABDOMINAL PAIN GREATER THAN 20 WEEKS EGA-A-ADOLFO      "

## 2022-10-02 NOTE — PROGRESS NOTES
Imaging reviewed, pain resolved on its own and gall stones are present without evidence of cholecystitis or pancreatitis. Without the pain her blood pressure is normal. Will discharge home as there is no concern for preeclampsia. Recommend continuing to check BP at home and present to the clinic for a BP check tomorrow 10/3.  Rebecca Vargas MD

## 2022-10-02 NOTE — PLAN OF CARE
IV started by IV team. Offered pt zofran and fentanyl but now she is resting comfortably in bed, has no nausea, and pain is gone. She says now it feels similar to hunger pains but not the same as the pain she came in with. Will hold off on fentanyl and zofran for now and await ultrasound. LR infusion started.

## 2022-10-03 ENCOUNTER — VIRTUAL VISIT (OUTPATIENT)
Dept: FAMILY MEDICINE | Facility: CLINIC | Age: 37
End: 2022-10-03
Payer: COMMERCIAL

## 2022-10-03 ENCOUNTER — ALLIED HEALTH/NURSE VISIT (OUTPATIENT)
Dept: NURSING | Facility: CLINIC | Age: 37
End: 2022-10-03

## 2022-10-03 VITALS — HEART RATE: 84 BPM | SYSTOLIC BLOOD PRESSURE: 129 MMHG | DIASTOLIC BLOOD PRESSURE: 87 MMHG

## 2022-10-03 DIAGNOSIS — K80.20 CALCULUS OF GALLBLADDER WITHOUT CHOLECYSTITIS WITHOUT OBSTRUCTION: Primary | ICD-10-CM

## 2022-10-03 DIAGNOSIS — O09.93 SUPERVISION OF HIGH RISK PREGNANCY IN THIRD TRIMESTER: Primary | ICD-10-CM

## 2022-10-03 DIAGNOSIS — Z01.30 BLOOD PRESSURE CHECK: ICD-10-CM

## 2022-10-03 LAB — T PALLIDUM AB SER QL: NONREACTIVE

## 2022-10-03 PROCEDURE — 99214 OFFICE O/P EST MOD 30 MIN: CPT | Mod: 95 | Performed by: PHYSICIAN ASSISTANT

## 2022-10-03 PROCEDURE — 99207 PR NO CHARGE NURSE ONLY: CPT

## 2022-10-03 RX ORDER — OXYTOCIN/0.9 % SODIUM CHLORIDE 30/500 ML
1-24 PLASTIC BAG, INJECTION (ML) INTRAVENOUS CONTINUOUS
Status: CANCELLED | OUTPATIENT
Start: 2022-10-03

## 2022-10-03 RX ORDER — SODIUM CHLORIDE, SODIUM LACTATE, POTASSIUM CHLORIDE, CALCIUM CHLORIDE 600; 310; 30; 20 MG/100ML; MG/100ML; MG/100ML; MG/100ML
INJECTION, SOLUTION INTRAVENOUS CONTINUOUS PRN
Status: CANCELLED | OUTPATIENT
Start: 2022-10-03

## 2022-10-03 RX ORDER — MISOPROSTOL 200 UG/1
800 TABLET ORAL
Status: CANCELLED | OUTPATIENT
Start: 2022-10-03

## 2022-10-03 RX ORDER — PROCHLORPERAZINE MALEATE 5 MG
10 TABLET ORAL EVERY 6 HOURS PRN
Status: CANCELLED | OUTPATIENT
Start: 2022-10-03

## 2022-10-03 RX ORDER — KETOROLAC TROMETHAMINE 30 MG/ML
30 INJECTION, SOLUTION INTRAMUSCULAR; INTRAVENOUS
Status: CANCELLED | OUTPATIENT
Start: 2022-10-03 | End: 2022-10-08

## 2022-10-03 RX ORDER — METHYLERGONOVINE MALEATE 0.2 MG/ML
200 INJECTION INTRAVENOUS
Status: CANCELLED | OUTPATIENT
Start: 2022-10-03

## 2022-10-03 RX ORDER — LIDOCAINE 40 MG/G
CREAM TOPICAL
Status: CANCELLED | OUTPATIENT
Start: 2022-10-03

## 2022-10-03 RX ORDER — OXYTOCIN 10 [USP'U]/ML
10 INJECTION, SOLUTION INTRAMUSCULAR; INTRAVENOUS
Status: CANCELLED | OUTPATIENT
Start: 2022-10-03

## 2022-10-03 RX ORDER — OXYTOCIN/0.9 % SODIUM CHLORIDE 30/500 ML
340 PLASTIC BAG, INJECTION (ML) INTRAVENOUS CONTINUOUS PRN
Status: CANCELLED | OUTPATIENT
Start: 2022-10-03

## 2022-10-03 RX ORDER — ONDANSETRON 2 MG/ML
4 INJECTION INTRAMUSCULAR; INTRAVENOUS EVERY 6 HOURS PRN
Status: CANCELLED | OUTPATIENT
Start: 2022-10-03

## 2022-10-03 RX ORDER — METOCLOPRAMIDE 5 MG/1
10 TABLET ORAL EVERY 6 HOURS PRN
Status: CANCELLED | OUTPATIENT
Start: 2022-10-03

## 2022-10-03 RX ORDER — MISOPROSTOL 100 UG/1
400 TABLET ORAL
Status: CANCELLED | OUTPATIENT
Start: 2022-10-03

## 2022-10-03 RX ORDER — NALOXONE HYDROCHLORIDE 0.4 MG/ML
0.4 INJECTION, SOLUTION INTRAMUSCULAR; INTRAVENOUS; SUBCUTANEOUS
Status: CANCELLED | OUTPATIENT
Start: 2022-10-03

## 2022-10-03 RX ORDER — ONDANSETRON 4 MG/1
4-8 TABLET, FILM COATED ORAL EVERY 8 HOURS PRN
Qty: 10 TABLET | Refills: 0 | Status: SHIPPED | OUTPATIENT
Start: 2022-10-03 | End: 2022-11-29

## 2022-10-03 RX ORDER — IBUPROFEN 200 MG
800 TABLET ORAL
Status: CANCELLED | OUTPATIENT
Start: 2022-10-03 | End: 2022-10-08

## 2022-10-03 RX ORDER — PROCHLORPERAZINE 25 MG
25 SUPPOSITORY, RECTAL RECTAL EVERY 12 HOURS PRN
Status: CANCELLED | OUTPATIENT
Start: 2022-10-03

## 2022-10-03 RX ORDER — CITRIC ACID/SODIUM CITRATE 334-500MG
30 SOLUTION, ORAL ORAL
Status: CANCELLED | OUTPATIENT
Start: 2022-10-03

## 2022-10-03 RX ORDER — METOCLOPRAMIDE HYDROCHLORIDE 5 MG/ML
10 INJECTION INTRAMUSCULAR; INTRAVENOUS EVERY 6 HOURS PRN
Status: CANCELLED | OUTPATIENT
Start: 2022-10-03

## 2022-10-03 RX ORDER — CARBOPROST TROMETHAMINE 250 UG/ML
250 INJECTION, SOLUTION INTRAMUSCULAR
Status: CANCELLED | OUTPATIENT
Start: 2022-10-03

## 2022-10-03 RX ORDER — NALOXONE HYDROCHLORIDE 0.4 MG/ML
0.2 INJECTION, SOLUTION INTRAMUSCULAR; INTRAVENOUS; SUBCUTANEOUS
Status: CANCELLED | OUTPATIENT
Start: 2022-10-03

## 2022-10-03 RX ORDER — ONDANSETRON 4 MG/1
4 TABLET, ORALLY DISINTEGRATING ORAL EVERY 6 HOURS PRN
Status: CANCELLED | OUTPATIENT
Start: 2022-10-03

## 2022-10-03 RX ORDER — OXYTOCIN/0.9 % SODIUM CHLORIDE 30/500 ML
100-340 PLASTIC BAG, INJECTION (ML) INTRAVENOUS CONTINUOUS PRN
Status: CANCELLED | OUTPATIENT
Start: 2022-10-03

## 2022-10-03 NOTE — PROGRESS NOTES
"Abi is a 37 year old who is being evaluated via a billable video visit.      How would you like to obtain your AVS? MyChart  If the video visit is dropped, the invitation should be resent by: Text to cell phone: [phone]  Will anyone else be joining your video visit? No          Assessment & Plan     Calculus of gallbladder without cholecystitis without obstruction  No signs of acute infection at this time; dietary changes discussed with patient at length and option to follow up with general surgery status post induction. Referral updated today. Prescription for as needed zofran sent to pharmacy as well. Return to clinic with any worsening or changes in symptoms or go to ER in off hours.  - Adult General Surg Referral; Future  - ondansetron (ZOFRAN) 4 MG tablet; Take 1-2 tablets (4-8 mg) by mouth every 8 hours as needed for nausea or vomiting    Review of prior external note(s) from - previous PCP/OB notes and recent hospital note  30 minutes spent on the date of the encounter doing chart review, history and exam, documentation and further activities per the note       BMI:   Estimated body mass index is 38.58 kg/m  as calculated from the following:    Height as of 2/24/22: 1.676 m (5' 6\").    Weight as of 9/27/22: 108.4 kg (239 lb).   Weight management plan: Discussed healthy diet and exercise guidelines    Patient Instructions   Experts recommend the following to help prevent gallstones:    Eat more foods that are high in fiber, such as  fruits, vegetables, beans, and peas.  whole grains, including brown rice, oats, and whole wheat bread.  Eat fewer refined carbohydrates and less sugar.  Eat healthy fats, like fish oil and olive oil, to help your gallbladder contract and empty on a regular basis.  Avoid unhealthy fats, like those often found in desserts and fried foods.      Did you know?      You can schedule a video visit for follow-up appointments as well as future appointments for certain conditions.  " Please see the below link.     Video Visits ([email or web address])     If you have not already done so,  I encourage you to sign up for [employer] ([email or web address]).  This will allow you to review your results, securely communicate with a provider, and schedule virtual visits as well.      Return in about 4 weeks (around 10/31/2022) for Follow up, with PCP, Routine Visit, or sooner with worsening symptoms.    Maude Montero PA-C  [facility] [place]    Rashid Alex is a 37 year old presenting for the following health issues:  Abdominal Pain      HPI     Patient had an ultrasound yesterday and gallstones were found. Currently is not experiencing any symptoms of abdominal pain.  Hospital Follow-up Visit:    Hospital/Nursing Home/IP Rehab Facility: [facility]  Date of Admission: 10/2/22  Date of Discharge: 10/2/22  Reason(s) for Admission: Gallstone  Was your hospitalization related to COVID-19? No   Problems taking medications regularly:  None  Medication changes since discharge: None  Problems adhering to non-medication therapy:  None    Summary of hospitalization:  [facility] discharge summary reviewed  Diagnostic Tests/Treatments reviewed.  Follow up needed: OB induction tomorrow, 10/4/22  Other Healthcare Providers Involved in Patient s Care:      None  Update since discharge: stable.         Post Medication Reconciliation Status: Discharge medications reconciled, continue medications without change      Plan of care communicated with patient             Patient 38w4d pregnant with plans for induction tomorrow (10/4/22).  Wondering what foods she can eat and when to go back to ED vs next steps.      Review of Systems   Constitutional, HEENT, cardiovascular, pulmonary, GI, , musculoskeletal, neuro, skin, endocrine and psych systems are negative, except as otherwise noted.      Objective           Vitals:  No vitals were  obtained today due to virtual visit.    Physical Exam   GENERAL: Healthy, alert and no distress  EYES: Eyes grossly normal to inspection.  No discharge or erythema, or obvious scleral/conjunctival abnormalities.  RESP: No audible wheeze, cough, or visible cyanosis.  No visible retractions or increased work of breathing.    SKIN: Visible skin clear. No significant rash, abnormal pigmentation or lesions.  NEURO: Cranial nerves grossly intact.  Mentation and speech appropriate for age.  PSYCH: Mentation appears normal, affect normal/bright, judgement and insight intact, normal speech and appearance well-groomed.            Video-Visit Details    Video Start Time: 11:48 AM    Type of service:  Video Visit    Video End Time:12:08 PM    Originating Location (pt. Location): Home    Distant Location (provider location):  [facility]     Platform used for Video Visit: Lexim

## 2022-10-03 NOTE — NURSING NOTE
"38w4d    In [facility] Sunday over the weekend for abdominal pain and dx w gallstones. BP's elevated 123/100 and 148/100 but was having pain and was sent home w insructions to f/u today for BP check.    Today's BP check: 122/91 and 129/87    Pt denies pain, HA, visual changes, RUQ pain or swelling at this time in clinic. However had another episode of RUQ pain/\"glall bladder\" pain last evening and nausea/visual changes last evening which resolved over time.    Dr Vargas in office and updated on BP's as above and dx w GHTN and would recommend induction tomorrow vs Thursday 10/6    Reviewed GHTN and sx to watch for and call to speak to a nurse. Go home and rest - avoid strenuous activity like walking the dog and call w ANY sx. She is willing to proceed w induction tomorrow as recommended. She will plan for this as well.    Discharged ambulatory.    Myriam Prater RN on 10/3/2022 at 11:29 AM        "

## 2022-10-03 NOTE — PATIENT INSTRUCTIONS
Experts recommend the following to help prevent gallstones:    Eat more foods that are high in fiber, such as  fruits, vegetables, beans, and peas.  whole grains, including brown rice, oats, and whole wheat bread.  Eat fewer refined carbohydrates and less sugar.  Eat healthy fats, like fish oil and olive oil, to help your gallbladder contract and empty on a regular basis.  Avoid unhealthy fats, like those often found in desserts and fried foods.      Did you know?      You can schedule a video visit for follow-up appointments as well as future appointments for certain conditions.  Please see the below link.     Video Visits ([email or web address])     If you have not already done so,  I encourage you to sign up for [employer] [email or web address]).  This will allow you to review your results, securely communicate with a provider, and schedule virtual visits as well.

## 2022-10-03 NOTE — Clinical Note
Pt willing to be induced Tuesday/tomorrow. Please schedule and let nurse know so we can send her a confirmation mychart as discussed in clinic.

## 2022-10-04 ENCOUNTER — ANESTHESIA EVENT (OUTPATIENT)
Dept: OBGYN | Facility: CLINIC | Age: 37
End: 2022-10-04
Payer: COMMERCIAL

## 2022-10-04 ENCOUNTER — HOSPITAL ENCOUNTER (INPATIENT)
Facility: CLINIC | Age: 37
LOS: 3 days | Discharge: HOME | End: 2022-10-07
Attending: OBSTETRICS & GYNECOLOGY | Admitting: OBSTETRICS & GYNECOLOGY
Payer: COMMERCIAL

## 2022-10-04 ENCOUNTER — ANESTHESIA (OUTPATIENT)
Dept: OBGYN | Facility: CLINIC | Age: 37
End: 2022-10-04
Payer: COMMERCIAL

## 2022-10-04 DIAGNOSIS — K80.20 CALCULUS OF GALLBLADDER WITHOUT CHOLECYSTITIS WITHOUT OBSTRUCTION: ICD-10-CM

## 2022-10-04 PROBLEM — O09.93 SUPERVISION OF HIGH RISK PREGNANCY IN THIRD TRIMESTER: Status: ACTIVE | Noted: 2022-10-04

## 2022-10-04 LAB
ABO/RH(D): NORMAL
ALBUMIN SERPL-MCNC: 2.3 G/DL (ref 3.4–5)
ALP SERPL-CCNC: 238 U/L (ref 40–150)
ALT SERPL W P-5'-P-CCNC: 81 U/L (ref 0–50)
ANTIBODY SCREEN: NEGATIVE
AST SERPL W P-5'-P-CCNC: 62 U/L (ref 0–45)
BILIRUB DIRECT SERPL-MCNC: 0.7 MG/DL (ref 0–0.2)
BILIRUB SERPL-MCNC: 1.3 MG/DL (ref 0.2–1.3)
ERYTHROCYTE [DISTWIDTH] IN BLOOD BY AUTOMATED COUNT: 13.2 % (ref 10–15)
HCT VFR BLD AUTO: 34.2 % (ref 35–47)
HGB BLD-MCNC: 11.8 G/DL (ref 11.7–15.7)
MCH RBC QN AUTO: 30.6 PG (ref 26.5–33)
MCHC RBC AUTO-ENTMCNC: 34.5 G/DL (ref 31.5–36.5)
MCV RBC AUTO: 89 FL (ref 78–100)
PLATELET # BLD AUTO: 257 10E3/UL (ref 150–450)
PROT SERPL-MCNC: 6.1 G/DL (ref 6.8–8.8)
RBC # BLD AUTO: 3.86 10E6/UL (ref 3.8–5.2)
SPECIMEN EXPIRATION DATE: NORMAL
T PALLIDUM AB SER QL: NONREACTIVE
WBC # BLD AUTO: 6.5 10E3/UL (ref 4–11)

## 2022-10-04 PROCEDURE — 86780 TREPONEMA PALLIDUM: CPT | Performed by: OBSTETRICS & GYNECOLOGY

## 2022-10-04 PROCEDURE — 0KQM0ZZ REPAIR PERINEUM MUSCLE, OPEN APPROACH: ICD-10-PCS | Performed by: OBSTETRICS & GYNECOLOGY

## 2022-10-04 PROCEDURE — 120N000012 HC R&B POSTPARTUM

## 2022-10-04 PROCEDURE — 250N000011 HC RX IP 250 OP 636: Performed by: ANESTHESIOLOGY

## 2022-10-04 PROCEDURE — 3E0R3BZ INTRODUCTION OF ANESTHETIC AGENT INTO SPINAL CANAL, PERCUTANEOUS APPROACH: ICD-10-PCS | Performed by: ANESTHESIOLOGY

## 2022-10-04 PROCEDURE — 370N000003 HC ANESTHESIA WARD SERVICE

## 2022-10-04 PROCEDURE — 59400 OBSTETRICAL CARE: CPT | Performed by: OBSTETRICS & GYNECOLOGY

## 2022-10-04 PROCEDURE — 250N000009 HC RX 250: Performed by: OBSTETRICS & GYNECOLOGY

## 2022-10-04 PROCEDURE — 85018 HEMOGLOBIN: CPT | Performed by: OBSTETRICS & GYNECOLOGY

## 2022-10-04 PROCEDURE — 10907ZC DRAINAGE OF AMNIOTIC FLUID, THERAPEUTIC FROM PRODUCTS OF CONCEPTION, VIA NATURAL OR ARTIFICIAL OPENING: ICD-10-PCS | Performed by: OBSTETRICS & GYNECOLOGY

## 2022-10-04 PROCEDURE — 250N000013 HC RX MED GY IP 250 OP 250 PS 637: Performed by: OBSTETRICS & GYNECOLOGY

## 2022-10-04 PROCEDURE — 86901 BLOOD TYPING SEROLOGIC RH(D): CPT | Performed by: OBSTETRICS & GYNECOLOGY

## 2022-10-04 PROCEDURE — 00HU33Z INSERTION OF INFUSION DEVICE INTO SPINAL CANAL, PERCUTANEOUS APPROACH: ICD-10-PCS | Performed by: ANESTHESIOLOGY

## 2022-10-04 PROCEDURE — 250N000013 HC RX MED GY IP 250 OP 250 PS 637: Performed by: STUDENT IN AN ORGANIZED HEALTH CARE EDUCATION/TRAINING PROGRAM

## 2022-10-04 PROCEDURE — 36415 COLL VENOUS BLD VENIPUNCTURE: CPT | Performed by: OBSTETRICS & GYNECOLOGY

## 2022-10-04 PROCEDURE — 258N000003 HC RX IP 258 OP 636: Performed by: OBSTETRICS & GYNECOLOGY

## 2022-10-04 PROCEDURE — 80076 HEPATIC FUNCTION PANEL: CPT | Performed by: STUDENT IN AN ORGANIZED HEALTH CARE EDUCATION/TRAINING PROGRAM

## 2022-10-04 PROCEDURE — 36415 COLL VENOUS BLD VENIPUNCTURE: CPT | Performed by: STUDENT IN AN ORGANIZED HEALTH CARE EDUCATION/TRAINING PROGRAM

## 2022-10-04 PROCEDURE — 722N000001 HC LABOR CARE VAGINAL DELIVERY SINGLE

## 2022-10-04 RX ORDER — BISACODYL 10 MG
10 SUPPOSITORY, RECTAL RECTAL DAILY PRN
Status: DISCONTINUED | OUTPATIENT
Start: 2022-10-04 | End: 2022-10-07 | Stop reason: HOSPADM

## 2022-10-04 RX ORDER — OXYCODONE HYDROCHLORIDE 5 MG/1
5 TABLET ORAL ONCE
Status: COMPLETED | OUTPATIENT
Start: 2022-10-04 | End: 2022-10-04

## 2022-10-04 RX ORDER — FENTANYL CITRATE-0.9 % NACL/PF 10 MCG/ML
100 PLASTIC BAG, INJECTION (ML) INTRAVENOUS EVERY 5 MIN PRN
Status: DISCONTINUED | OUTPATIENT
Start: 2022-10-04 | End: 2022-10-04

## 2022-10-04 RX ORDER — OXYTOCIN/0.9 % SODIUM CHLORIDE 30/500 ML
1-24 PLASTIC BAG, INJECTION (ML) INTRAVENOUS CONTINUOUS
Status: DISCONTINUED | OUTPATIENT
Start: 2022-10-04 | End: 2022-10-04

## 2022-10-04 RX ORDER — TRANEXAMIC ACID 10 MG/ML
1 INJECTION, SOLUTION INTRAVENOUS EVERY 30 MIN PRN
Status: DISCONTINUED | OUTPATIENT
Start: 2022-10-04 | End: 2022-10-04

## 2022-10-04 RX ORDER — DOCUSATE SODIUM 100 MG/1
100 CAPSULE, LIQUID FILLED ORAL DAILY
Status: DISCONTINUED | OUTPATIENT
Start: 2022-10-04 | End: 2022-10-07 | Stop reason: HOSPADM

## 2022-10-04 RX ORDER — METOCLOPRAMIDE HYDROCHLORIDE 5 MG/ML
10 INJECTION INTRAMUSCULAR; INTRAVENOUS EVERY 6 HOURS PRN
Status: DISCONTINUED | OUTPATIENT
Start: 2022-10-04 | End: 2022-10-04

## 2022-10-04 RX ORDER — ONDANSETRON 2 MG/ML
4 INJECTION INTRAMUSCULAR; INTRAVENOUS EVERY 6 HOURS PRN
Status: DISCONTINUED | OUTPATIENT
Start: 2022-10-04 | End: 2022-10-04

## 2022-10-04 RX ORDER — METHYLERGONOVINE MALEATE 0.2 MG/ML
200 INJECTION INTRAVENOUS
Status: DISCONTINUED | OUTPATIENT
Start: 2022-10-04 | End: 2022-10-07 | Stop reason: HOSPADM

## 2022-10-04 RX ORDER — OXYTOCIN 10 [USP'U]/ML
10 INJECTION, SOLUTION INTRAMUSCULAR; INTRAVENOUS
Status: DISCONTINUED | OUTPATIENT
Start: 2022-10-04 | End: 2022-10-04

## 2022-10-04 RX ORDER — NALOXONE HYDROCHLORIDE 0.4 MG/ML
0.4 INJECTION, SOLUTION INTRAMUSCULAR; INTRAVENOUS; SUBCUTANEOUS
Status: DISCONTINUED | OUTPATIENT
Start: 2022-10-04 | End: 2022-10-04

## 2022-10-04 RX ORDER — MISOPROSTOL 200 UG/1
400 TABLET ORAL
Status: DISCONTINUED | OUTPATIENT
Start: 2022-10-04 | End: 2022-10-07 | Stop reason: HOSPADM

## 2022-10-04 RX ORDER — OXYTOCIN 10 [USP'U]/ML
10 INJECTION, SOLUTION INTRAMUSCULAR; INTRAVENOUS
Status: DISCONTINUED | OUTPATIENT
Start: 2022-10-04 | End: 2022-10-07 | Stop reason: HOSPADM

## 2022-10-04 RX ORDER — ACETAMINOPHEN 325 MG/1
650 TABLET ORAL EVERY 4 HOURS PRN
Status: DISCONTINUED | OUTPATIENT
Start: 2022-10-04 | End: 2022-10-04

## 2022-10-04 RX ORDER — MISOPROSTOL 200 UG/1
800 TABLET ORAL
Status: DISCONTINUED | OUTPATIENT
Start: 2022-10-04 | End: 2022-10-07 | Stop reason: HOSPADM

## 2022-10-04 RX ORDER — HYDROCORTISONE 25 MG/G
CREAM TOPICAL 3 TIMES DAILY PRN
Status: DISCONTINUED | OUTPATIENT
Start: 2022-10-04 | End: 2022-10-07 | Stop reason: HOSPADM

## 2022-10-04 RX ORDER — OXYTOCIN/0.9 % SODIUM CHLORIDE 30/500 ML
340 PLASTIC BAG, INJECTION (ML) INTRAVENOUS CONTINUOUS PRN
Status: DISCONTINUED | OUTPATIENT
Start: 2022-10-04 | End: 2022-10-04

## 2022-10-04 RX ORDER — IBUPROFEN 400 MG/1
800 TABLET, FILM COATED ORAL
Status: DISCONTINUED | OUTPATIENT
Start: 2022-10-04 | End: 2022-10-04

## 2022-10-04 RX ORDER — OXYTOCIN/0.9 % SODIUM CHLORIDE 30/500 ML
340 PLASTIC BAG, INJECTION (ML) INTRAVENOUS CONTINUOUS PRN
Status: DISCONTINUED | OUTPATIENT
Start: 2022-10-04 | End: 2022-10-07 | Stop reason: HOSPADM

## 2022-10-04 RX ORDER — TRANEXAMIC ACID 10 MG/ML
1 INJECTION, SOLUTION INTRAVENOUS EVERY 30 MIN PRN
Status: DISCONTINUED | OUTPATIENT
Start: 2022-10-04 | End: 2022-10-07 | Stop reason: HOSPADM

## 2022-10-04 RX ORDER — LIDOCAINE 40 MG/G
CREAM TOPICAL
Status: DISCONTINUED | OUTPATIENT
Start: 2022-10-04 | End: 2022-10-04

## 2022-10-04 RX ORDER — PROCHLORPERAZINE MALEATE 5 MG
10 TABLET ORAL EVERY 6 HOURS PRN
Status: DISCONTINUED | OUTPATIENT
Start: 2022-10-04 | End: 2022-10-04

## 2022-10-04 RX ORDER — ONDANSETRON 4 MG/1
4 TABLET, ORALLY DISINTEGRATING ORAL EVERY 6 HOURS PRN
Status: DISCONTINUED | OUTPATIENT
Start: 2022-10-04 | End: 2022-10-04

## 2022-10-04 RX ORDER — MODIFIED LANOLIN
OINTMENT (GRAM) TOPICAL
Status: DISCONTINUED | OUTPATIENT
Start: 2022-10-04 | End: 2022-10-07 | Stop reason: HOSPADM

## 2022-10-04 RX ORDER — ACETAMINOPHEN 325 MG/1
650 TABLET ORAL EVERY 4 HOURS PRN
Status: DISCONTINUED | OUTPATIENT
Start: 2022-10-04 | End: 2022-10-07 | Stop reason: HOSPADM

## 2022-10-04 RX ORDER — ESCITALOPRAM OXALATE 20 MG/1
20 TABLET ORAL DAILY
Status: DISCONTINUED | OUTPATIENT
Start: 2022-10-04 | End: 2022-10-07 | Stop reason: HOSPADM

## 2022-10-04 RX ORDER — MISOPROSTOL 200 UG/1
800 TABLET ORAL
Status: DISCONTINUED | OUTPATIENT
Start: 2022-10-04 | End: 2022-10-04

## 2022-10-04 RX ORDER — METOCLOPRAMIDE 10 MG/1
10 TABLET ORAL EVERY 6 HOURS PRN
Status: DISCONTINUED | OUTPATIENT
Start: 2022-10-04 | End: 2022-10-04

## 2022-10-04 RX ORDER — OXYTOCIN/0.9 % SODIUM CHLORIDE 30/500 ML
100-340 PLASTIC BAG, INJECTION (ML) INTRAVENOUS CONTINUOUS PRN
Status: DISCONTINUED | OUTPATIENT
Start: 2022-10-04 | End: 2022-10-04

## 2022-10-04 RX ORDER — SODIUM CHLORIDE, SODIUM LACTATE, POTASSIUM CHLORIDE, CALCIUM CHLORIDE 600; 310; 30; 20 MG/100ML; MG/100ML; MG/100ML; MG/100ML
INJECTION, SOLUTION INTRAVENOUS CONTINUOUS PRN
Status: DISCONTINUED | OUTPATIENT
Start: 2022-10-04 | End: 2022-10-04

## 2022-10-04 RX ORDER — IBUPROFEN 400 MG/1
800 TABLET, FILM COATED ORAL EVERY 6 HOURS PRN
Status: DISCONTINUED | OUTPATIENT
Start: 2022-10-04 | End: 2022-10-07 | Stop reason: HOSPADM

## 2022-10-04 RX ORDER — NALOXONE HYDROCHLORIDE 0.4 MG/ML
0.2 INJECTION, SOLUTION INTRAMUSCULAR; INTRAVENOUS; SUBCUTANEOUS
Status: DISCONTINUED | OUTPATIENT
Start: 2022-10-04 | End: 2022-10-04

## 2022-10-04 RX ORDER — NALBUPHINE HYDROCHLORIDE 10 MG/ML
2.5-5 INJECTION, SOLUTION INTRAMUSCULAR; INTRAVENOUS; SUBCUTANEOUS EVERY 6 HOURS PRN
Status: DISCONTINUED | OUTPATIENT
Start: 2022-10-04 | End: 2022-10-04

## 2022-10-04 RX ORDER — METHYLERGONOVINE MALEATE 0.2 MG/ML
200 INJECTION INTRAVENOUS
Status: DISCONTINUED | OUTPATIENT
Start: 2022-10-04 | End: 2022-10-04

## 2022-10-04 RX ORDER — CARBOPROST TROMETHAMINE 250 UG/ML
250 INJECTION, SOLUTION INTRAMUSCULAR
Status: DISCONTINUED | OUTPATIENT
Start: 2022-10-04 | End: 2022-10-04

## 2022-10-04 RX ORDER — MISOPROSTOL 200 UG/1
400 TABLET ORAL
Status: DISCONTINUED | OUTPATIENT
Start: 2022-10-04 | End: 2022-10-04

## 2022-10-04 RX ORDER — KETOROLAC TROMETHAMINE 30 MG/ML
30 INJECTION, SOLUTION INTRAMUSCULAR; INTRAVENOUS
Status: DISCONTINUED | OUTPATIENT
Start: 2022-10-04 | End: 2022-10-04

## 2022-10-04 RX ORDER — CARBOPROST TROMETHAMINE 250 UG/ML
250 INJECTION, SOLUTION INTRAMUSCULAR
Status: DISCONTINUED | OUTPATIENT
Start: 2022-10-04 | End: 2022-10-07 | Stop reason: HOSPADM

## 2022-10-04 RX ORDER — PROCHLORPERAZINE 25 MG
25 SUPPOSITORY, RECTAL RECTAL EVERY 12 HOURS PRN
Status: DISCONTINUED | OUTPATIENT
Start: 2022-10-04 | End: 2022-10-04

## 2022-10-04 RX ORDER — CITRIC ACID/SODIUM CITRATE 334-500MG
30 SOLUTION, ORAL ORAL
Status: DISCONTINUED | OUTPATIENT
Start: 2022-10-04 | End: 2022-10-04

## 2022-10-04 RX ADMIN — Medication 2 MILLI-UNITS/MIN: at 08:43

## 2022-10-04 RX ADMIN — SODIUM CHLORIDE, POTASSIUM CHLORIDE, SODIUM LACTATE AND CALCIUM CHLORIDE: 600; 310; 30; 20 INJECTION, SOLUTION INTRAVENOUS at 08:23

## 2022-10-04 RX ADMIN — ACETAMINOPHEN 650 MG: 325 TABLET, FILM COATED ORAL at 08:40

## 2022-10-04 RX ADMIN — SODIUM CHLORIDE, POTASSIUM CHLORIDE, SODIUM LACTATE AND CALCIUM CHLORIDE 1000 ML: 600; 310; 30; 20 INJECTION, SOLUTION INTRAVENOUS at 11:18

## 2022-10-04 RX ADMIN — IBUPROFEN 800 MG: 400 TABLET, FILM COATED ORAL at 22:57

## 2022-10-04 RX ADMIN — Medication 12 ML/HR: at 11:41

## 2022-10-04 RX ADMIN — ACETAMINOPHEN 650 MG: 325 TABLET, FILM COATED ORAL at 22:57

## 2022-10-04 RX ADMIN — ACETAMINOPHEN 650 MG: 325 TABLET, FILM COATED ORAL at 18:18

## 2022-10-04 RX ADMIN — IBUPROFEN 800 MG: 400 TABLET, FILM COATED ORAL at 17:33

## 2022-10-04 RX ADMIN — OXYCODONE HYDROCHLORIDE 5 MG: 5 TABLET ORAL at 18:51

## 2022-10-04 ASSESSMENT — ACTIVITIES OF DAILY LIVING (ADL)
ADLS_ACUITY_SCORE: 18
ADLS_ACUITY_SCORE: 19
ADLS_ACUITY_SCORE: 18
ADLS_ACUITY_SCORE: 19

## 2022-10-04 NOTE — PLAN OF CARE
1230 - SVE done due to patient feeling pressure.  Cervix found to be 7/90/-1-0 station.  Dr. Hunter updated.  Orders received to recheck cervix at 1300.  Patient updated and agrees with plan.    1245 - SVE by HEATHER Pena, NATALEE shows 10/100/+1.  Dr. Hunter called to come for delivery at 1250  Room set up for delivery.    1255 - Dr. Hunter at bedside for delivery.  Second RN at bedside.  Pushing instructions given.  Begin pushing.    1312 -  of viable male infant. See delivery summary for details.

## 2022-10-04 NOTE — PLAN OF CARE
1031 - Patient up to bathroom.  Patient requesting to be more active after resting this morning.  Plan for On monitor placement.  Patient agrees.    1044 - Patient back from bathroom.  Abdomen prepped for No monitor.  No placed and is tracing FHT's and contractions well.

## 2022-10-04 NOTE — ANESTHESIA PROCEDURE NOTES
Epidural catheter Procedure Note    Pre-Procedure   Staff -        Anesthesiologist:  Emmanuel Rojas MD       Performed By: anesthesiologist       Referred By: OB       Location: OB       Pre-Anesthestic Checklist: patient identified, IV checked, risks and benefits discussed, informed consent, monitors and equipment checked and pre-op evaluation  Timeout:       Correct Patient: Yes        Correct Procedure: Yes        Correct Site: Yes        Correct Position: Yes   Procedure Documentation  Procedure: epidural catheter       Patient Position: sitting       Patient Prep/Sterile Barriers: sterile gloves, mask, patient draped       Skin prep: Chloraprep       Local skin infiltrated with 3 mL of 2% lidocaine.        Insertion Site: L3-4. (midline approach).       Technique: LORT saline        MARK at 8 cm.       Needle Type: ToSlantrangey needle       Needle Gauge: 17.        Needle Length (Inches): 3.5        Catheter: 19 G.          Catheter threaded easily.         4 cm epidural space.         Threaded 12 cm at skin.         # of attempts: 1 and  # of redirects:     Assessment/Narrative         Paresthesias: No.       Test dose of 3 mL lidocaine 1.5% w/ 1:200,000 epinephrine at 11:40 CDT.         Test dose negative, 3 minutes after injection, for signs of intravascular, subdural, or intrathecal injection.       Insertion/Infusion Method: LORT saline       Aspiration negative for Heme or CSF via Epidural Catheter.    Medication(s) Administered   0.125% Bupivacaine + 2 mcg/mL Fentanyl via CADD (Epidural) - EPIDURAL   10 mL - 10/4/2022 11:43:00 AM

## 2022-10-04 NOTE — H&P
Significant change in general health status.  See prenatal record and notation in the progress note.  36yo  admitted at 38+5wks for IOL due to GHTN.  Seen in MAC over the weekend for likely gallstones/RUQ pain and found to have elevated mildly elevated blood pressures with normal labs including Pr:Cr of 0.27.  Repeat BP check in clinic yesterday with diastolic of 91 and IOL recommended.  Cervix today 3/70/-3.  AROM for clear fluid.  Continuous monitoring.  Epidural if/when desired.  Close BP monitoring with labs if elevated.    EFW: 7lb 8oz     Zonia Hunter MD

## 2022-10-04 NOTE — PLAN OF CARE
Data: Abi Dawkins transferred to 428 via wheelchair at 1605. Baby transferred via parent's arms.  Action: Receiving unit notified of transfer: Yes. Patient and family notified of room change. Report given to Barbara BARTH RN at 1610. Belongings sent to receiving unit. Accompanied by Registered Nurse. Oriented patient to surroundings. Call light within reach. ID bands double-checked with receiving RN.  Response: Patient tolerated transfer and is stable.

## 2022-10-04 NOTE — ANESTHESIA PREPROCEDURE EVALUATION
Anesthesia Pre-Procedure Evaluation    Patient: Abi Dawkins   MRN: [id number] : 1985        Procedure : * No procedures listed *          Past Medical History:   Diagnosis Date     Anxiety      Cholesteatoma 2012     COVID-19 2022    Sx's started Friday fatigue, chills, headache, runny nose. Saturday started coughing, weird episode of shakes-intense shivering. Yesterday loss sense of taste. No fevers. No known exposure     Hypertension     GHTN with second pregnancy     Major depressive disorder, recurrent episode, mild (H)      Migraines       Past Surgical History:   Procedure Laterality Date     ENT SURGERY  2011    typanamastoidectomy     ENT SURGERY Left [id number]    10 T-tube placements     LASIK CUSTOMVUE BILATERAL Bilateral 2015    Procedure: LASIK CUSTOMVUE BILATERAL;  Surgeon: Maycol Bella MD;  Location:  [facility]     WAVESCAN SCREENING Bilateral 2015    Procedure: WAVESCAN SCREENING;  Surgeon: Maycol Bella MD;  Location:  [facility]      Allergies   Allergen Reactions     Cats      Runny nose, redness under eyes, sniffling      Social History     Tobacco Use     Smoking status: Never Smoker     Smokeless tobacco: Never Used   Substance Use Topics     Alcohol use: Not Currently     Comment: one drink      Wt Readings from Last 1 Encounters:   10/04/22 108.9 kg (240 lb)        Anesthesia Evaluation            ROS/MED HX  ENT/Pulmonary:    (-) asthma   Neurologic:  - neg neurologic ROS     Cardiovascular:     (+) hypertension----- (-) PIH   METS/Exercise Tolerance:     Hematologic:     (+) no anticoagulation therapy, no coagulopathy,     Musculoskeletal:       GI/Hepatic:     (+) GERD,     Renal/Genitourinary:       Endo:     (+) Obesity,     Psychiatric/Substance Use:     (+) psychiatric history anxiety     Infectious Disease:       Malignancy:       Other:            Physical Exam    Airway        Mallampati: II   TM distance: > 3 FB   Neck ROM: full     Respiratory Devices  and Support         Dental  no notable dental history         Cardiovascular   cardiovascular exam normal          Pulmonary   pulmonary exam normal                OUTSIDE LABS:  CBC:   Lab Results   Component Value Date    WBC 6.5 10/04/2022    WBC 10.3 10/02/2022    HGB 11.8 10/04/2022    HGB 13.2 10/02/2022    HCT 34.2 (L) 10/04/2022    HCT 38.8 10/02/2022     10/04/2022     10/02/2022     BMP:   Lab Results   Component Value Date     09/21/2018     03/08/2016    POTASSIUM 4.5 09/21/2018    POTASSIUM 3.6 03/08/2016    CHLORIDE 102 09/21/2018    CHLORIDE 105 03/08/2016    CO2 31 09/21/2018    CO2 25 03/08/2016    BUN 19 09/21/2018    BUN 12 03/08/2016    CR 0.96 10/02/2022    CR 0.94 12/06/2019    GLC 69 (L) 09/21/2018    GLC 98 10/04/2016     COAGS: No results found for: PTT, INR, FIBR  POC:   Lab Results   Component Value Date     (H) 10/04/2016     HEPATIC:   Lab Results   Component Value Date    ALBUMIN 4.0 09/21/2018    PROTTOTAL 7.9 09/21/2018    ALT 50 10/02/2022    AST 31 10/02/2022    ALKPHOS 71 09/21/2018    BILITOTAL 0.6 09/21/2018     OTHER:   Lab Results   Component Value Date    A1C 5.0 09/21/2018    ISHAN 9.3 09/21/2018    LIPASE 198 10/02/2022    AMYLASE 77 10/02/2022    TSH 0.95 12/06/2019       Anesthesia Plan    ASA Status:  2      Anesthesia Type: Epidural.              Consents    Anesthesia Plan(s) and associated risks, benefits, and realistic alternatives discussed. Questions answered and patient/representative(s) expressed understanding.    - Discussed:     - Discussed with:  Patient         Postoperative Care            Comments:    Other Comments: Orders to manage the epidural infusion have been entered, and through coordination with the nurse, we will continute to manage and monitor the patient's labor epidural.  We will continuously be available to adjust as needed thruout the entire L&D process.             Emmanuel Rojas MD

## 2022-10-04 NOTE — ANESTHESIA POSTPROCEDURE EVALUATION
Patient: Abi Dawkins    Procedure: * No procedures listed *       Anesthesia Type:  Epidural    Note:     Postop Pain Control: Uneventful            Sign Out: Well controlled pain   PONV: No   Neuro/Psych: Uneventful            Sign Out: Acceptable/Baseline neuro status   Airway/Respiratory: Uneventful            Sign Out: Acceptable/Baseline resp. status   CV/Hemodynamics: Uneventful            Sign Out: Acceptable CV status; No obvious hypovolemia; No obvious fluid overload   Other NRE: NONE   DID A NON-ROUTINE EVENT OCCUR? No           Last vitals:  Vitals:    10/04/22 1435 10/04/22 1450 10/04/22 1505   BP: 129/88 114/68 122/76   Resp:   16   Temp:      SpO2:          Electronically Signed By: Priti Santiago MD, MD  October 4, 2022  4:11 PM

## 2022-10-04 NOTE — PLAN OF CARE
0749 -  at 38+5/7 weeks admitted to induction of labor due to GHTN.  POC discussed including monitoring, lab tests, SVE, AROM, Pitocin.  Patient verbalizes understanding and agreement with POC.  Monitors applied.  Admission assessment completed.    0800 - Dr. Hunter at bedside for eval, SVE and AROM.  Patient verbally consents.  SVE by MD shows 3/-3.  AROM performed for small amount of clear fluid.      0843 - Pitocin discussed with patient and  who agree to proceed.  Pitocin started per MD order.

## 2022-10-04 NOTE — LACTATION NOTE
"Initial Lactation visit with Abi, significant other Peter & baby boy Manan. Abi shared she had a poor breastfeeding experience with her first child 6 years ago; she's hoping to be able to breastfeed this time around but is open to feeding Manan however she needs to. She shared he fed well at first feeding, but at time of visit she was struggling to get baby to latch.     LC assisted with positioning on left side in cross cradle and then in football hold, but he didn't latch well even with full LC assist. Checked suck with gloved finger and noted more uncoordinated, biting suck that improved with some gentle tongue massage. Recommended trying on right side and he was able to latch in football hold. Abi felt latch was pinchy so reviewed how to relatch him and how to roll lower lip down and out. We were able to get a deeper latch and noted audible swallows, but took full assist to keep him latched deeply. Abi felt positioning for breastfeeding wasn't very comfortable so after a few minutes, changed over to left side. Added a blanket roll and was able to get baby to latch more easily in cross cradle; Abi felt latch was comfortable.      brought lanolin to room and by the time returning, Abi had removed baby from breast and stated, \"I just need a break\". Offered support and encouragement. Updated primary RN on feedings.    Recommend unlimited, frequent breast feedings: At least 8 - 12 times every 24 hours. Recommended rooming in. Instructed in hand expression. Avoid pacifiers and supplementation with formula unless medically indicated. Explained benefits of holding baby skin on skin to help promote better breastfeeding outcomes. Abi has a pump for home use. Will revisit as needed.    Tuyet Chavarria, RN-C, IBCLC, MNN, PHN, BSN    "

## 2022-10-04 NOTE — L&D DELIVERY NOTE
of viable male at 1312   Baby's weight not yet recorded      Apgars 8, 9     Placenta delivered spontaneously and intact   Nuchal cord easily reduced   2nd degree laceration repaired    Primary OB: Hunter

## 2022-10-04 NOTE — PROVIDER NOTIFICATION
MD Corbin notified of pt in excruciating pain, same as her previous gallstone attacks. Originally had thought it was cramping and tried heat and ibuprofen, then tylenol but pain became apparent as the same as gallstone issues. MD instructed RN to order 5mg oxycodone once now and notify if no improvement, and MD will place additional orders related to GI consult and NPO status. Will continue to monitor.

## 2022-10-04 NOTE — L&D DELIVERY NOTE
Delivery Date: 10/04/2022    Abi is a 37-year-old G2, P1 who was brought to Labor and Delivery at 38+5 weeks' gestation for induction of labor secondary to gestational hypertension.  [facility] 38+3 weeks' gestation with acute gallbladder attack due to known gallstones, and at that time had elevated blood pressures.  [facility] labs were all otherwise within normal limits, including a protein-to-creatinine ratio of 0.27.  As her pain resolved, blood pressures improved and plan was for short interval followup with blood pressure check in clinic the following day.  Repeat blood pressure check had a slightly elevated diastolic, and for this reason, she was recommended for induction the following day.  On arrival, Abi was 3 cm dilated and -3 station.  Prenatal laboratories were within normal limits, including blood type O positive, rubella status immune, Glucola within normal limits at both 20 and 28 weeks' gestation and GBS negative.  Due to maternal obesity, she did have a growth ultrasound at 34 weeks, which was within normal limits.  Due to advanced maternal age, Abi opted for genetic screening and had normal [employer] testing, as well as alpha-fetoprotein testing.  She also had a normal level 2 ultrasound with maternal fetal medicine.  Abi had COVID approximately 2 weeks prior to delivery with minor symptoms and complete recovery.  Abi received pertussis, flu and COVID vaccinations, including booster, during this pregnancy.  Estimated fetal weight is 7-1/2 pounds.    On arrival to Labor and Delivery, Abi was found to be 3 cm dilated, 70% effaced, and -3 station.  Rupture of membranes was performed for clear fluid.  Pitocin was started for augmentation.  Abi received an epidural for pain control, after which she was found to be 7 cm dilated and -1 station.  Fetal heart tones remained reactive throughout the labor process with baseline in the 140s with moderate variability  and accelerations present.  Abi quickly became complete and +1 station with excellent maternal effort, delivered a male infant in the right occiput anterior position at 1312.  Nuchal cord x 1 was easily reduced on the perineum and remainder of baby was delivered and placed on maternal abdomen.  Cord clamping was delayed by 1 minute.  Placenta was delivered spontaneously and intact.  Second-degree perineal laceration was repaired in the usual fashion using a 3-0 Vicryl.  Both mother and infant were doing very well following delivery and are stable for recovery.    DELIVERY TIMES:  Not yet recorded.    FINAL DIAGNOSES:   1.  Successful medical induction of labor at 38+5 weeks' gestation with a male infant with weight not yet recorded and Apgars of 8 and 9.  2.  Advanced maternal age.  3.  Gestational hypertension.  4.  Maternal obesity.  5.  Epidural for pain control.  6.  Second-degree perineal laceration.    Zonia Hunter MD        D: 10/04/2022   T: 10/04/2022   MT: FILI    Name:     AMARA DUARTEIDI HATTIESal  MRN:      [id number]        Account:       [id number]   :      1985           Delivery Date: 10/04/2022     Document: [id number]

## 2022-10-05 ENCOUNTER — APPOINTMENT (OUTPATIENT)
Dept: MRI IMAGING | Facility: CLINIC | Age: 37
End: 2022-10-05
Attending: PHYSICIAN ASSISTANT
Payer: COMMERCIAL

## 2022-10-05 LAB
ALBUMIN SERPL-MCNC: 2.2 G/DL (ref 3.4–5)
ALP SERPL-CCNC: 218 U/L (ref 40–150)
ALT SERPL W P-5'-P-CCNC: 81 U/L (ref 0–50)
ANION GAP SERPL CALCULATED.3IONS-SCNC: 9 MMOL/L (ref 3–14)
AST SERPL W P-5'-P-CCNC: 54 U/L (ref 0–45)
BILIRUB SERPL-MCNC: 1 MG/DL (ref 0.2–1.3)
BUN SERPL-MCNC: 10 MG/DL (ref 7–30)
CALCIUM SERPL-MCNC: 9.2 MG/DL (ref 8.5–10.1)
CHLORIDE BLD-SCNC: 111 MMOL/L (ref 94–109)
CO2 SERPL-SCNC: 20 MMOL/L (ref 20–32)
CREAT SERPL-MCNC: 0.84 MG/DL (ref 0.52–1.04)
ERYTHROCYTE [DISTWIDTH] IN BLOOD BY AUTOMATED COUNT: 13.1 % (ref 10–15)
GFR SERPL CREATININE-BSD FRML MDRD: >90 ML/MIN/1.73M2
GLUCOSE BLD-MCNC: 82 MG/DL (ref 70–99)
HCT VFR BLD AUTO: 35.7 % (ref 35–47)
HGB BLD-MCNC: 12 G/DL (ref 11.7–15.7)
MCH RBC QN AUTO: 30.6 PG (ref 26.5–33)
MCHC RBC AUTO-ENTMCNC: 33.6 G/DL (ref 31.5–36.5)
MCV RBC AUTO: 91 FL (ref 78–100)
PLATELET # BLD AUTO: 239 10E3/UL (ref 150–450)
POTASSIUM BLD-SCNC: 3.9 MMOL/L (ref 3.4–5.3)
PROT SERPL-MCNC: 5.8 G/DL (ref 6.8–8.8)
RBC # BLD AUTO: 3.92 10E6/UL (ref 3.8–5.2)
SODIUM SERPL-SCNC: 140 MMOL/L (ref 133–144)
WBC # BLD AUTO: 8.9 10E3/UL (ref 4–11)

## 2022-10-05 PROCEDURE — 99254 IP/OBS CNSLTJ NEW/EST MOD 60: CPT | Mod: 57 | Performed by: SURGERY

## 2022-10-05 PROCEDURE — 85027 COMPLETE CBC AUTOMATED: CPT | Performed by: OBSTETRICS & GYNECOLOGY

## 2022-10-05 PROCEDURE — 74183 MRI ABD W/O CNTR FLWD CNTR: CPT

## 2022-10-05 PROCEDURE — 255N000002 HC RX 255 OP 636: Performed by: OBSTETRICS & GYNECOLOGY

## 2022-10-05 PROCEDURE — A9585 GADOBUTROL INJECTION: HCPCS | Performed by: OBSTETRICS & GYNECOLOGY

## 2022-10-05 PROCEDURE — 82248 BILIRUBIN DIRECT: CPT | Performed by: OBSTETRICS & GYNECOLOGY

## 2022-10-05 PROCEDURE — 250N000013 HC RX MED GY IP 250 OP 250 PS 637: Performed by: OBSTETRICS & GYNECOLOGY

## 2022-10-05 PROCEDURE — 36415 COLL VENOUS BLD VENIPUNCTURE: CPT | Performed by: OBSTETRICS & GYNECOLOGY

## 2022-10-05 PROCEDURE — 120N000012 HC R&B POSTPARTUM

## 2022-10-05 RX ORDER — NALOXONE HYDROCHLORIDE 0.4 MG/ML
0.2 INJECTION, SOLUTION INTRAMUSCULAR; INTRAVENOUS; SUBCUTANEOUS
Status: DISCONTINUED | OUTPATIENT
Start: 2022-10-05 | End: 2022-10-07 | Stop reason: HOSPADM

## 2022-10-05 RX ORDER — GADOBUTROL 604.72 MG/ML
11 INJECTION INTRAVENOUS ONCE
Status: COMPLETED | OUTPATIENT
Start: 2022-10-05 | End: 2022-10-05

## 2022-10-05 RX ORDER — NALOXONE HYDROCHLORIDE 0.4 MG/ML
0.4 INJECTION, SOLUTION INTRAMUSCULAR; INTRAVENOUS; SUBCUTANEOUS
Status: DISCONTINUED | OUTPATIENT
Start: 2022-10-05 | End: 2022-10-07 | Stop reason: HOSPADM

## 2022-10-05 RX ORDER — OXYCODONE HYDROCHLORIDE 5 MG/1
5 TABLET ORAL EVERY 4 HOURS PRN
Status: DISCONTINUED | OUTPATIENT
Start: 2022-10-05 | End: 2022-10-06

## 2022-10-05 RX ADMIN — ACETAMINOPHEN 650 MG: 325 TABLET, FILM COATED ORAL at 18:03

## 2022-10-05 RX ADMIN — IBUPROFEN 800 MG: 400 TABLET, FILM COATED ORAL at 05:18

## 2022-10-05 RX ADMIN — DOCUSATE SODIUM 100 MG: 100 CAPSULE, LIQUID FILLED ORAL at 08:01

## 2022-10-05 RX ADMIN — ACETAMINOPHEN 650 MG: 325 TABLET, FILM COATED ORAL at 05:17

## 2022-10-05 RX ADMIN — IBUPROFEN 800 MG: 400 TABLET, FILM COATED ORAL at 12:11

## 2022-10-05 RX ADMIN — IBUPROFEN 800 MG: 400 TABLET, FILM COATED ORAL at 18:03

## 2022-10-05 RX ADMIN — ESCITALOPRAM OXALATE 20 MG: 20 TABLET ORAL at 08:01

## 2022-10-05 RX ADMIN — ACETAMINOPHEN 650 MG: 325 TABLET, FILM COATED ORAL at 12:11

## 2022-10-05 RX ADMIN — GADOBUTROL 11 ML: 604.72 INJECTION INTRAVENOUS at 10:23

## 2022-10-05 ASSESSMENT — ACTIVITIES OF DAILY LIVING (ADL)
ADLS_ACUITY_SCORE: 19
ADLS_ACUITY_SCORE: 18
ADLS_ACUITY_SCORE: 19

## 2022-10-05 NOTE — PLAN OF CARE
Vitally stable. Breastfeeding poor. Giving up easily.  Tried pumping. Supplementing 10ml donor milk finger feeding. Fundus firm, midline. Independent to bathroom and in room.

## 2022-10-05 NOTE — CONSULTS
Acute recurrent biliary colic  Elevated LFTs, U/S consistent Gallstone no biliary ductal dilation or biliary obstruction.   Post partum  Clear liquid dit  Npo after midnight  Repeat labs in AM  Consider surgical consult  MRCP in AM    Jerome Oliveros MD FACP  [facility]

## 2022-10-05 NOTE — PLAN OF CARE
VSS, fundus firm, scant flow, voiding adequately, ambulated well with standby assist. Breastfeeding encouraged at least 8x in 24 hours. Pain from delivery had been minimal to none, see previous note in regard to current plan for cholestasis. Oxycodone was given at 1851, plan to update MD immediately if pain does not improve. Awaiting stat labs and GI consult. Patient is very tearful and upset, she was under the understanding this would all go away once she delivered, and she doesn't know how she can care for a  in this much pain. This RN and  ensured patient she will have any help she needs and we would fully care for baby until she was able to. Will continue to monitor.

## 2022-10-05 NOTE — PROGRESS NOTES
[facility]    Post-Partum Progress Note    Assessment & Plan   Assessment:  Post-partum day #1  Normal spontaneous vaginal delivery  Acute biliary colic    Doing well.  No excessive bleeding  Pain well-controlled.    Plan:  Ambulation encouraged  Hemoglobin in AM  Lactation consultation  Monitor wound for signs of infection  Pain control measures as needed  Reportable signs and symptoms dicussed with the patient  Appreciate Dr. Oliveros's assistance with gallbladder issues; will await MRCP results today and plan moving forward; AM labs pending    Zonia Hunter MD     Interval History   Doing well.  Pain is adequately controlled--had one dose oxycodone last evening for RUQ pain and no issues since.  A bit more uterine cramping.  No fevers.  No history of foul-smelling vaginal discharge.  Good appetite --no n/v.  Currently NPO for MRCP.  Denies chest pain, shortness of breath, nausea or vomiting.  Vaginal bleeding is similar to a heavy menstrual flow.  Breastfeeding a bit frustrating. Really nervous about going home and having another gallstone attack     Medications     - MEDICATION INSTRUCTIONS -       - MEDICATION INSTRUCTIONS -       oxytocin in 0.9% NaCl         docusate sodium  100 mg Oral Daily     escitalopram  20 mg Oral Daily       Physical Exam   Temp: 98.4  F (36.9  C) Temp src: Oral BP: 130/86 Pulse: 75   Resp: 16 SpO2: 97 % O2 Device: None (Room air)    Vitals:    10/04/22 0806   Weight: 108.9 kg (240 lb)     Vital Signs with Ranges  Temp:  [97.5  F (36.4  C)-98.4  F (36.9  C)] 98.4  F (36.9  C)  Pulse:  [68-75] 75  Resp:  [16-18] 16  BP: (114-142)/(63-93) 130/86  SpO2:  [96 %-98 %] 97 %  I/O last 3 completed shifts:  In: -   Out: 62 [Blood:62]    Uterine fundus is firm, non-tender and at the level of the umbilicus  Extremities Non-tender    Data   Recent Labs   Lab Test 10/04/22  0837 03/03/22  1423 10/03/16  0906   ABO  --   --  O   RH  --   --   Pos   AS Negative   < > Neg     < > = values in this interval not displayed.     Recent Labs   Lab Test 10/04/22  0837 10/02/22  1129   HGB 11.8 13.2     Recent Labs   Lab Test 03/03/22  1423   RUQIGG Positive

## 2022-10-05 NOTE — CONSULTS
[facility] General Surgery Consultation    Abi Dawkins MRN# [id number]   YOB: 1985 Age: 37 year old      Date of Admission:  10/4/2022  Date of Consult: 10/5/2022         Assessment and Plan:   Patient is a 37 year old female, currently breast-feeding, post-partum day#1 from a normal vaginal delivery, with symptomatic cholelithiasis.    PLAN:  - GI planning to obtain MRCP  - Surgical plan pending MRCP findings and shared-decision making with patient. Discussed briefly minimally invasive cholecystectomy and indications including risks, benefits, and therapeutic alternatives particularly as patient is currently breastfeeding  - Continue supportive cares including analgesics prn  - Remainder of excellent cares per primary    Thank you for involving us in Ms. Dawkins care. Please page/call surgery service with any questions or concerns.         Requesting Physician:      Zonia Zarate PA-C        Chief Complaint:   Right upper abdominal pain         History of Present Illness:   Abi Dawkins is a 37 year old female who is post-partum day 1 from a normal spontaneous vaginal delivery of a baby boy, now seen in consultation at the request of Zonia Zarate PA-C for right upper quadrant abdominal pain. Patient indicates she first had a single episode of right upper quadrant abdominal pain during her second trimester, and these episodes have increased in frequency and severity during her third trimester. She indicates that over the past 5 days, she has had several episodes of moderate to severe pain in the right upper quadrant which at times can last for several hours. She has not noticed any correlation with eating. She has had associated nausea and NBNB emesis. She denies fevers, chills, dyspnea, diarrhea or other changes in her bowel movements. She is currently nursing.    She had a right upper quadrant US on 10/2 which demonstrated cholelithiasis without evidence of acute cholecystitis.  "Labs reviewed and significant for ALT 81-> 81, AST 62 -> 54, T. Bili 1.3 ->1, D bili 0.7, alk phos 238 -> 218          Physical Exam:   Blood pressure (!) 123/91, pulse 55, temperature 98.4  F (36.9  C), temperature source Oral, resp. rate 16, height 1.676 m (5' 6\"), weight 108.9 kg (240 lb), last menstrual period 12/30/2021, SpO2 97 %, unknown if currently breastfeeding.  240 lbs 0 oz  General: alert, oriented, lying in bed in NAD  Psych: Alert and Oriented.  Normal affect  Neurological: grossly intact  Eyes: Sclera clear  Respiratory:  nonlabored breathing on room air  Cardiovascular:  Regular rate and rhythm   GI: Soft, obese, non-distended, minimally tender to palpation in the RUQ, negative Martinez's sign   Integumentary:  No rashes         Past Medical History:     Past Medical History:   Diagnosis Date     Anxiety      Cholesteatoma 07/19/2012     COVID-19 09/19/2022    Sx's started Friday fatigue, chills, headache, runny nose. Saturday started coughing, weird episode of shakes-intense shivering. Yesterday loss sense of taste. No fevers. No known exposure     Hypertension     GHTN with second pregnancy     Major depressive disorder, recurrent episode, mild (H)      Migraines             Past Surgical History:     Past Surgical History:   Procedure Laterality Date     ENT SURGERY  2011    typanamastoidectomy     ENT SURGERY Left [id number]    10 T-tube placements     LASIK CUSTOMVUE BILATERAL Bilateral 5/11/2015    Procedure: LASIK CUSTOMVUE BILATERAL;  Surgeon: Maycol Bella MD;  Location: [facility]     WAVESCAN SCREENING Bilateral 5/11/2015    Procedure: WAVESCAN SCREENING;  Surgeon: Maycol Bella MD;  Location: [facility]            Current Medications:           docusate sodium  100 mg Oral Daily     escitalopram  20 mg Oral Daily       acetaminophen, benzocaine-menthol, bisacodyl, carboprost, hydrocortisone (Perianal), ibuprofen, lanolin, methylergonovine, misoprostol **OR** misoprostol, - MEDICATION " INSTRUCTIONS -, - MEDICATION INSTRUCTIONS -, oxytocin in 0.9% NaCl, oxytocin, tranexamic acid         Home Medications:     Prior to Admission medications    Medication Sig Last Dose Taking? Auth Provider Long Term End Date   escitalopram (LEXAPRO) 20 MG tablet Take 1 tablet (20 mg) by mouth daily Past Month at Unknown time Yes Zonia Hunter MD Yes    lamoTRIgine (LAMICTAL) 25 MG chewable tablet Take 50 mg by mouth 2 times daily Past Month at Unknown time Yes Reported, Patient Yes    ondansetron (ZOFRAN) 4 MG tablet Take 1-2 tablets (4-8 mg) by mouth every 8 hours as needed for nausea or vomiting Past Week at Unknown time Yes Maude Montero PA-C     Prenatal Vit-Fe Fumarate-FA (PRENATAL MULTIVITAMIN W/IRON) 27-0.8 MG tablet Take 1 tablet by mouth daily Past Week at Unknown time Yes Reported, Patient     rizatriptan (MAXALT-MLT) 5 MG ODT tab Take 1 tablet (5 mg) by mouth at onset of headache for migraine More than a month at Unknown time Yes Candace Lara MD No             Allergies:     Allergies   Allergen Reactions     Cats      Runny nose, redness under eyes, sniffling            Family History:     Family History   Problem Relation Age of Onset     Diabetes Father      Hypertension Father      Allergies Father      High cholesterol Father      No Known Problems Mother      Asthma Brother      Hypertension Brother      Anxiety Disorder Brother      No Known Problems Sister      No Known Problems Maternal Grandmother      No Known Problems Maternal Grandfather      No Known Problems Paternal Grandmother      No Known Problems Other      Breast Cancer No family hx of      Colon Cancer No family hx of            Social History:   Abi KUHN Juancho  reports that she has never smoked. She has never used smokeless tobacco. She reports previous alcohol use. She reports that she does not use drugs.          Review of Systems:   The 10 point Review of Systems is negative other than noted in the HPI.          Labs/Imaging   All new lab and imaging data was reviewed.   I have personally reviewed the imaging studies        Dago Coombs MD   [facility]  p:[phone]

## 2022-10-05 NOTE — CONSULTS
[facility]  Gastroenterology Consultation         Abi Dawkins  [address]  [place] [address]  37 year old female    Admission Date/Time: 10/4/2022  Primary Care Provider: Zonia Hunter  Referring / Attending Physician:  Dr. Deann Redman    We were asked to see the patient in consultation by Dr. Deann Redman for evaluation of RUQ pain.      CC: RUQ pain    HPI:  Abi Dawkins is a 37 year old female whom is post partum day #1 with a normal spontaneous vaginal delivery whom has complained of significant RUQ pain intermittently over last week and found to have cholelithiasis without evidence of acute cholecystitis on 10/2/22. She denies chills, fevers, nausea, vomiting, shortness of breath. She reports no correlation with pain with eating or drinking. She denies diarrhea. She is currently nursing.    Labs significant for Alkaline phosphatase 238->218. ALT/AST 81/62->81/54. Total bilirubin 1.0.     Patient states she is very hungry and needs to eat and not willing to go all day without eating. Also demanding pain medication in case has another attack. States does not want to leave until this is figured out.    ROS: A comprehensive ten point review of systems was negative aside from those in mentioned in the HPI.      PAST MED HX:  I have reviewed this patient's medical history and updated it with pertinent information if needed.   Past Medical History:   Diagnosis Date     Anxiety      Cholesteatoma 07/19/2012     COVID-19 09/19/2022    Sx's started Friday fatigue, chills, headache, runny nose. Saturday started coughing, weird episode of shakes-intense shivering. Yesterday loss sense of taste. No fevers. No known exposure     Hypertension     GHTN with second pregnancy     Major depressive disorder, recurrent episode, mild (H)      Migraines        MEDICATIONS:   Prior to Admission Medications   Prescriptions Last Dose Informant Patient Reported? Taking?   Prenatal Vit-Fe Fumarate-FA  (PRENATAL MULTIVITAMIN W/IRON) 27-0.8 MG tablet Past Week at Unknown time  Yes Yes   Sig: Take 1 tablet by mouth daily   escitalopram (LEXAPRO) 20 MG tablet Past Month at Unknown time  No Yes   Sig: Take 1 tablet (20 mg) by mouth daily   lamoTRIgine (LAMICTAL) 25 MG chewable tablet Past Month at Unknown time  Yes Yes   Sig: Take 50 mg by mouth 2 times daily   ondansetron (ZOFRAN) 4 MG tablet Past Week at Unknown time  No Yes   Sig: Take 1-2 tablets (4-8 mg) by mouth every 8 hours as needed for nausea or vomiting   rizatriptan (MAXALT-MLT) 5 MG ODT tab More than a month at Unknown time  No Yes   Sig: Take 1 tablet (5 mg) by mouth at onset of headache for migraine      Facility-Administered Medications: None       ALLERGIES:   Allergies   Allergen Reactions     Cats      Runny nose, redness under eyes, sniffling       SOCIAL HISTORY:  Social History     Tobacco Use     Smoking status: Never Smoker     Smokeless tobacco: Never Used   Vaping Use     Vaping Use: Never used   Substance Use Topics     Alcohol use: Not Currently     Comment: one drink     Drug use: No       FAMILY HISTORY:  Family History   Problem Relation Age of Onset     Diabetes Father      Hypertension Father      Allergies Father      High cholesterol Father      No Known Problems Mother      Asthma Brother      Hypertension Brother      Anxiety Disorder Brother      No Known Problems Sister      No Known Problems Maternal Grandmother      No Known Problems Maternal Grandfather      No Known Problems Paternal Grandmother      No Known Problems Other      Breast Cancer No family hx of      Colon Cancer No family hx of        PHYSICAL EXAM:   General  Alert, oriented and comfortable  Vital Signs with Ranges  Temp: 98.4  F (36.9  C) Temp src: Oral BP: (!) 123/91 Pulse: 55   Resp: 16 SpO2: 97 %      I/O last 3 completed shifts:  In: -   Out: 62 [Blood:62]    Constitutional: healthy, alert and no distress   Cardiovascular: negative, PMI normal. No lifts,  heaves, or thrills. RRR. No murmurs, clicks gallops or rub  Respiratory: negative, Percussion normal. Good diaphragmatic excursion. Lungs clear  Abdomen: Abdomen soft, non-tender. BS normal. No masses, organomegaly          ADDITIONAL COMMENTS:   I reviewed the patient's new clinical lab test results.   Recent Labs   Lab Test 10/05/22  0745 10/04/22  0837 10/02/22  1129   WBC 8.9 6.5 10.3   HGB 12.0 11.8 13.2   MCV 91 89 90    257 286     Recent Labs   Lab Test 10/05/22  0745 09/21/18  0934 03/08/16 2124   POTASSIUM 3.9 4.5 3.6   CHLORIDE 111* 102 105   CO2  --  31 25   BUN  --  19 12   ANIONGAP  --  6 7     Recent Labs   Lab Test 10/04/22  1913 10/02/22  1129 03/03/22  1321 09/21/18  0934 03/08/16 2124 03/08/16 2124 03/08/16 2123 02/23/16  1516   ALBUMIN 2.3*  --   --  4.0  --  3.7  --   --    BILITOTAL 1.3  --   --  0.6  --  0.8  --   --    ALT 81* 50  --  22   < > 34  --   --    AST 62* 31  --  23   < > 19  --   --    PROTEIN  --   --  Negative  --   --   --  Negative Negative   LIPASE  --  198  --   --   --  212  --   --    AMYLASE  --  77  --   --   --   --   --   --     < > = values in this interval not displayed.       I reviewed the patient's new imaging results.        CONSULTATION ASSESSMENT AND PLAN:    Abi Dawkins is a 37 year old female whom is postpartum day #1 with c/o RUQ pain.    RUQ pain  Cholelithiasis  Elevated LFTs  Abdominal ultrasound on 10/2/22 noted cholelithiasis with 3 mm CBD  LFTs: Alkaline phosphatase 238->218. ALT/AST 81/62->81/54. Total bilirubin 1.0.   Patient likely has cholelithiasis and possible choledocholithiasis.    - Recommend MRCP (Ordered) to determine if ERCP is necessary  - Patient adamant about eating- will let eat breakfast and then NPO after as had to be NPO for 4 hours prior to MRCP  -- Consult surgery  -- Daily labs  -- Contacted Dr. Hunter about pain control- I do not manage this and defer to primary team for pain control          Zonia Zarate,  [facility].  Office: [phone]  Cell : [phone] (Dr. Oliveros)  Cell: [phone] (Zonia Zarate PA-C)

## 2022-10-05 NOTE — PLAN OF CARE
Vital signs stable. Postpartum assessment WDL. Pain controlled with ibuprofen and Tylenol. Patient has gall stones.  A scan showed multiple stones.   Surgeon has scheduled patient for surgery tomorrow at noon but will cancel if patient decides to wait.  Patient is hesitant about making a decision since she is also postpartum.  She knows she will be NPO after midnight if she plans to move forward with surgery.  At this time pain is only a 2 and is perineal.   Patient voiding without difficulty. Patient is pumping and giving infant donor milk or formula via bottle.  Spouse very supportive. .Patient and infant bonding well. Will continue with current plan of care.

## 2022-10-05 NOTE — PLAN OF CARE
Vital signs stable. Patient voiding without difficulty. Able to ambulate in room free of dizziness. Taking tylenol/ibuprofen for pain management. Has previously been dealing with severe right upper quadrant pain. Patient went for an MRCP to determine further plan. GI MD spoke with patient and her  about the next steps (see note).   Patient has decided to pump and bottle, infant is taking donor milk until mothers milk is in. Encouraged to call with questions/concerns. Will continue to monitor.

## 2022-10-06 ENCOUNTER — ANESTHESIA (OUTPATIENT)
Dept: SURGERY | Facility: CLINIC | Age: 37
End: 2022-10-06
Payer: COMMERCIAL

## 2022-10-06 ENCOUNTER — ANESTHESIA EVENT (OUTPATIENT)
Dept: SURGERY | Facility: CLINIC | Age: 37
End: 2022-10-06
Payer: COMMERCIAL

## 2022-10-06 ENCOUNTER — APPOINTMENT (OUTPATIENT)
Dept: GENERAL RADIOLOGY | Facility: CLINIC | Age: 37
End: 2022-10-06
Attending: OBSTETRICS & GYNECOLOGY
Payer: COMMERCIAL

## 2022-10-06 LAB
ALBUMIN SERPL-MCNC: 2.2 G/DL (ref 3.4–5)
ALP SERPL-CCNC: 218 U/L (ref 40–150)
ALT SERPL W P-5'-P-CCNC: 81 U/L (ref 0–50)
AST SERPL W P-5'-P-CCNC: 54 U/L (ref 0–45)
BILIRUB DIRECT SERPL-MCNC: 0.4 MG/DL (ref 0–0.2)
BILIRUB SERPL-MCNC: 1 MG/DL (ref 0.2–1.3)
PROT SERPL-MCNC: 5.8 G/DL (ref 6.8–8.8)

## 2022-10-06 PROCEDURE — 250N000013 HC RX MED GY IP 250 OP 250 PS 637: Performed by: OBSTETRICS & GYNECOLOGY

## 2022-10-06 PROCEDURE — 250N000013 HC RX MED GY IP 250 OP 250 PS 637: Performed by: PHYSICIAN ASSISTANT

## 2022-10-06 PROCEDURE — 272N000001 HC OR GENERAL SUPPLY STERILE: Performed by: SURGERY

## 2022-10-06 PROCEDURE — 258N000003 HC RX IP 258 OP 636: Performed by: ANESTHESIOLOGY

## 2022-10-06 PROCEDURE — 250N000011 HC RX IP 250 OP 636: Performed by: SURGERY

## 2022-10-06 PROCEDURE — 710N000010 HC RECOVERY PHASE 1, LEVEL 2, PER MIN: Performed by: SURGERY

## 2022-10-06 PROCEDURE — 360N000080 HC SURGERY LEVEL 7, PER MIN: Performed by: SURGERY

## 2022-10-06 PROCEDURE — 255N000002 HC RX 255 OP 636: Performed by: SURGERY

## 2022-10-06 PROCEDURE — 8E0W4CZ ROBOTIC ASSISTED PROCEDURE OF TRUNK REGION, PERCUTANEOUS ENDOSCOPIC APPROACH: ICD-10-PCS | Performed by: SURGERY

## 2022-10-06 PROCEDURE — 88304 TISSUE EXAM BY PATHOLOGIST: CPT | Mod: 26 | Performed by: PATHOLOGY

## 2022-10-06 PROCEDURE — BF101ZZ FLUOROSCOPY OF BILE DUCTS USING LOW OSMOLAR CONTRAST: ICD-10-PCS | Performed by: SURGERY

## 2022-10-06 PROCEDURE — BF50200 OTHER IMAGING OF BILE DUCTS USING FLUORESCING AGENT, INDOCYANINE GREEN DYE, INTRAOPERATIVE: ICD-10-PCS | Performed by: SURGERY

## 2022-10-06 PROCEDURE — 88304 TISSUE EXAM BY PATHOLOGIST: CPT | Mod: TC | Performed by: SURGERY

## 2022-10-06 PROCEDURE — 250N000011 HC RX IP 250 OP 636: Performed by: ANESTHESIOLOGY

## 2022-10-06 PROCEDURE — 47563 LAPARO CHOLECYSTECTOMY/GRAPH: CPT | Mod: AS | Performed by: PHYSICIAN ASSISTANT

## 2022-10-06 PROCEDURE — 250N000009 HC RX 250: Performed by: NURSE ANESTHETIST, CERTIFIED REGISTERED

## 2022-10-06 PROCEDURE — 999N000141 HC STATISTIC PRE-PROCEDURE NURSING ASSESSMENT: Performed by: SURGERY

## 2022-10-06 PROCEDURE — 999N000179 XR SURGERY CARM FLUORO LESS THAN 5 MIN W STILLS

## 2022-10-06 PROCEDURE — 0FT44ZZ RESECTION OF GALLBLADDER, PERCUTANEOUS ENDOSCOPIC APPROACH: ICD-10-PCS | Performed by: SURGERY

## 2022-10-06 PROCEDURE — 250N000011 HC RX IP 250 OP 636: Performed by: NURSE ANESTHETIST, CERTIFIED REGISTERED

## 2022-10-06 PROCEDURE — 250N000009 HC RX 250: Performed by: SURGERY

## 2022-10-06 PROCEDURE — 370N000017 HC ANESTHESIA TECHNICAL FEE, PER MIN: Performed by: SURGERY

## 2022-10-06 PROCEDURE — 120N000012 HC R&B POSTPARTUM

## 2022-10-06 PROCEDURE — 47563 LAPARO CHOLECYSTECTOMY/GRAPH: CPT | Performed by: SURGERY

## 2022-10-06 RX ORDER — PROPOFOL 10 MG/ML
INJECTION, EMULSION INTRAVENOUS PRN
Status: DISCONTINUED | OUTPATIENT
Start: 2022-10-06 | End: 2022-10-06

## 2022-10-06 RX ORDER — HYDROMORPHONE HCL IN WATER/PF 6 MG/30 ML
0.2 PATIENT CONTROLLED ANALGESIA SYRINGE INTRAVENOUS EVERY 5 MIN PRN
Status: DISCONTINUED | OUTPATIENT
Start: 2022-10-06 | End: 2022-10-06 | Stop reason: HOSPADM

## 2022-10-06 RX ORDER — KETOROLAC TROMETHAMINE 15 MG/ML
15 INJECTION, SOLUTION INTRAMUSCULAR; INTRAVENOUS ONCE
Status: COMPLETED | OUTPATIENT
Start: 2022-10-06 | End: 2022-10-06

## 2022-10-06 RX ORDER — FENTANYL CITRATE 50 UG/ML
25 INJECTION, SOLUTION INTRAMUSCULAR; INTRAVENOUS EVERY 5 MIN PRN
Status: DISCONTINUED | OUTPATIENT
Start: 2022-10-06 | End: 2022-10-06 | Stop reason: HOSPADM

## 2022-10-06 RX ORDER — SODIUM CHLORIDE, SODIUM LACTATE, POTASSIUM CHLORIDE, CALCIUM CHLORIDE 600; 310; 30; 20 MG/100ML; MG/100ML; MG/100ML; MG/100ML
INJECTION, SOLUTION INTRAVENOUS CONTINUOUS
Status: DISCONTINUED | OUTPATIENT
Start: 2022-10-06 | End: 2022-10-06 | Stop reason: HOSPADM

## 2022-10-06 RX ORDER — MAGNESIUM HYDROXIDE 1200 MG/15ML
LIQUID ORAL PRN
Status: DISCONTINUED | OUTPATIENT
Start: 2022-10-06 | End: 2022-10-06 | Stop reason: HOSPADM

## 2022-10-06 RX ORDER — CEFAZOLIN SODIUM/WATER 2 G/20 ML
2 SYRINGE (ML) INTRAVENOUS SEE ADMIN INSTRUCTIONS
Status: DISCONTINUED | OUTPATIENT
Start: 2022-10-06 | End: 2022-10-06 | Stop reason: HOSPADM

## 2022-10-06 RX ORDER — HYDROMORPHONE HCL IN WATER/PF 6 MG/30 ML
0.4 PATIENT CONTROLLED ANALGESIA SYRINGE INTRAVENOUS EVERY 5 MIN PRN
Status: DISCONTINUED | OUTPATIENT
Start: 2022-10-06 | End: 2022-10-06 | Stop reason: HOSPADM

## 2022-10-06 RX ORDER — INDOCYANINE GREEN AND WATER 25 MG
1.25 KIT INJECTION ONCE
Status: COMPLETED | OUTPATIENT
Start: 2022-10-06 | End: 2022-10-06

## 2022-10-06 RX ORDER — HYDROMORPHONE HYDROCHLORIDE 1 MG/ML
INJECTION, SOLUTION INTRAMUSCULAR; INTRAVENOUS; SUBCUTANEOUS PRN
Status: DISCONTINUED | OUTPATIENT
Start: 2022-10-06 | End: 2022-10-06

## 2022-10-06 RX ORDER — DEXAMETHASONE SODIUM PHOSPHATE 4 MG/ML
INJECTION, SOLUTION INTRA-ARTICULAR; INTRALESIONAL; INTRAMUSCULAR; INTRAVENOUS; SOFT TISSUE PRN
Status: DISCONTINUED | OUTPATIENT
Start: 2022-10-06 | End: 2022-10-06

## 2022-10-06 RX ORDER — OXYCODONE HYDROCHLORIDE 5 MG/1
5-10 TABLET ORAL EVERY 4 HOURS PRN
Status: DISCONTINUED | OUTPATIENT
Start: 2022-10-06 | End: 2022-10-07 | Stop reason: HOSPADM

## 2022-10-06 RX ORDER — CEFAZOLIN SODIUM/WATER 2 G/20 ML
2 SYRINGE (ML) INTRAVENOUS
Status: COMPLETED | OUTPATIENT
Start: 2022-10-06 | End: 2022-10-06

## 2022-10-06 RX ORDER — ONDANSETRON 4 MG/1
4 TABLET, ORALLY DISINTEGRATING ORAL EVERY 30 MIN PRN
Status: DISCONTINUED | OUTPATIENT
Start: 2022-10-06 | End: 2022-10-06 | Stop reason: HOSPADM

## 2022-10-06 RX ORDER — POLYETHYLENE GLYCOL 3350 17 G/17G
17 POWDER, FOR SOLUTION ORAL DAILY
Status: DISCONTINUED | OUTPATIENT
Start: 2022-10-06 | End: 2022-10-07 | Stop reason: HOSPADM

## 2022-10-06 RX ORDER — ONDANSETRON 2 MG/ML
4 INJECTION INTRAMUSCULAR; INTRAVENOUS EVERY 30 MIN PRN
Status: DISCONTINUED | OUTPATIENT
Start: 2022-10-06 | End: 2022-10-06 | Stop reason: HOSPADM

## 2022-10-06 RX ORDER — PROPOFOL 10 MG/ML
INJECTION, EMULSION INTRAVENOUS CONTINUOUS PRN
Status: DISCONTINUED | OUTPATIENT
Start: 2022-10-06 | End: 2022-10-06

## 2022-10-06 RX ORDER — BUPIVACAINE HYDROCHLORIDE AND EPINEPHRINE 5; 5 MG/ML; UG/ML
INJECTION, SOLUTION PERINEURAL PRN
Status: DISCONTINUED | OUTPATIENT
Start: 2022-10-06 | End: 2022-10-06 | Stop reason: HOSPADM

## 2022-10-06 RX ORDER — IOPAMIDOL 612 MG/ML
INJECTION, SOLUTION INTRAVASCULAR PRN
Status: DISCONTINUED | OUTPATIENT
Start: 2022-10-06 | End: 2022-10-06 | Stop reason: HOSPADM

## 2022-10-06 RX ORDER — FENTANYL CITRATE 50 UG/ML
INJECTION, SOLUTION INTRAMUSCULAR; INTRAVENOUS PRN
Status: DISCONTINUED | OUTPATIENT
Start: 2022-10-06 | End: 2022-10-06

## 2022-10-06 RX ORDER — OXYCODONE HYDROCHLORIDE 5 MG/1
5 TABLET ORAL EVERY 4 HOURS PRN
Status: DISCONTINUED | OUTPATIENT
Start: 2022-10-06 | End: 2022-10-06 | Stop reason: HOSPADM

## 2022-10-06 RX ORDER — LIDOCAINE HYDROCHLORIDE 20 MG/ML
INJECTION, SOLUTION INFILTRATION; PERINEURAL PRN
Status: DISCONTINUED | OUTPATIENT
Start: 2022-10-06 | End: 2022-10-06

## 2022-10-06 RX ADMIN — DOCUSATE SODIUM 100 MG: 100 CAPSULE, LIQUID FILLED ORAL at 08:17

## 2022-10-06 RX ADMIN — DEXAMETHASONE SODIUM PHOSPHATE 4 MG: 4 INJECTION, SOLUTION INTRA-ARTICULAR; INTRALESIONAL; INTRAMUSCULAR; INTRAVENOUS; SOFT TISSUE at 13:19

## 2022-10-06 RX ADMIN — HYDROMORPHONE HYDROCHLORIDE 0.4 MG: 0.2 INJECTION, SOLUTION INTRAMUSCULAR; INTRAVENOUS; SUBCUTANEOUS at 15:38

## 2022-10-06 RX ADMIN — OXYCODONE HYDROCHLORIDE 5 MG: 5 TABLET ORAL at 17:50

## 2022-10-06 RX ADMIN — KETOROLAC TROMETHAMINE 15 MG: 15 INJECTION, SOLUTION INTRAMUSCULAR; INTRAVENOUS at 15:59

## 2022-10-06 RX ADMIN — SODIUM CHLORIDE, POTASSIUM CHLORIDE, SODIUM LACTATE AND CALCIUM CHLORIDE: 600; 310; 30; 20 INJECTION, SOLUTION INTRAVENOUS at 11:47

## 2022-10-06 RX ADMIN — FENTANYL CITRATE 100 MCG: 50 INJECTION, SOLUTION INTRAMUSCULAR; INTRAVENOUS at 12:54

## 2022-10-06 RX ADMIN — MIDAZOLAM 2 MG: 1 INJECTION INTRAMUSCULAR; INTRAVENOUS at 12:46

## 2022-10-06 RX ADMIN — INDOCYANINE GREEN AND WATER 1.25 MG: KIT at 11:33

## 2022-10-06 RX ADMIN — SODIUM CHLORIDE, POTASSIUM CHLORIDE, SODIUM LACTATE AND CALCIUM CHLORIDE: 600; 310; 30; 20 INJECTION, SOLUTION INTRAVENOUS at 14:13

## 2022-10-06 RX ADMIN — HYDROMORPHONE HYDROCHLORIDE 0.4 MG: 0.2 INJECTION, SOLUTION INTRAMUSCULAR; INTRAVENOUS; SUBCUTANEOUS at 16:22

## 2022-10-06 RX ADMIN — HYDROMORPHONE HYDROCHLORIDE 0.2 MG: 0.2 INJECTION, SOLUTION INTRAMUSCULAR; INTRAVENOUS; SUBCUTANEOUS at 15:22

## 2022-10-06 RX ADMIN — SUCCINYLCHOLINE CHLORIDE 80 MG: 20 INJECTION, SOLUTION INTRAMUSCULAR; INTRAVENOUS; PARENTERAL at 12:54

## 2022-10-06 RX ADMIN — ROCURONIUM BROMIDE 50 MG: 50 INJECTION, SOLUTION INTRAVENOUS at 13:07

## 2022-10-06 RX ADMIN — PROPOFOL 200 MCG/KG/MIN: 10 INJECTION, EMULSION INTRAVENOUS at 12:54

## 2022-10-06 RX ADMIN — ACETAMINOPHEN 650 MG: 325 TABLET, FILM COATED ORAL at 20:47

## 2022-10-06 RX ADMIN — HYDROMORPHONE HYDROCHLORIDE 0.4 MG: 0.2 INJECTION, SOLUTION INTRAMUSCULAR; INTRAVENOUS; SUBCUTANEOUS at 15:46

## 2022-10-06 RX ADMIN — HYDROMORPHONE HYDROCHLORIDE 0.4 MG: 0.2 INJECTION, SOLUTION INTRAMUSCULAR; INTRAVENOUS; SUBCUTANEOUS at 16:09

## 2022-10-06 RX ADMIN — PROPOFOL 200 MG: 10 INJECTION, EMULSION INTRAVENOUS at 12:54

## 2022-10-06 RX ADMIN — SUGAMMADEX 200 MG: 100 INJECTION, SOLUTION INTRAVENOUS at 14:34

## 2022-10-06 RX ADMIN — ESCITALOPRAM OXALATE 20 MG: 20 TABLET ORAL at 08:17

## 2022-10-06 RX ADMIN — FENTANYL CITRATE 25 MCG: 50 INJECTION, SOLUTION INTRAMUSCULAR; INTRAVENOUS at 14:55

## 2022-10-06 RX ADMIN — ACETAMINOPHEN 650 MG: 325 TABLET, FILM COATED ORAL at 08:17

## 2022-10-06 RX ADMIN — HYDROMORPHONE HYDROCHLORIDE 0.2 MG: 0.2 INJECTION, SOLUTION INTRAMUSCULAR; INTRAVENOUS; SUBCUTANEOUS at 15:07

## 2022-10-06 RX ADMIN — ONDANSETRON 4 MG: 2 INJECTION INTRAMUSCULAR; INTRAVENOUS at 14:34

## 2022-10-06 RX ADMIN — IBUPROFEN 800 MG: 400 TABLET, FILM COATED ORAL at 08:17

## 2022-10-06 RX ADMIN — LIDOCAINE HYDROCHLORIDE 100 MG: 20 INJECTION, SOLUTION INFILTRATION; PERINEURAL at 12:54

## 2022-10-06 RX ADMIN — HYDROMORPHONE HYDROCHLORIDE 0.4 MG: 0.2 INJECTION, SOLUTION INTRAMUSCULAR; INTRAVENOUS; SUBCUTANEOUS at 15:55

## 2022-10-06 RX ADMIN — HYDROMORPHONE HYDROCHLORIDE 0.2 MG: 0.2 INJECTION, SOLUTION INTRAMUSCULAR; INTRAVENOUS; SUBCUTANEOUS at 15:28

## 2022-10-06 RX ADMIN — Medication 2 G: at 12:52

## 2022-10-06 RX ADMIN — ROCURONIUM BROMIDE 10 MG: 50 INJECTION, SOLUTION INTRAVENOUS at 13:38

## 2022-10-06 RX ADMIN — HYDROMORPHONE HYDROCHLORIDE 0.2 MG: 0.2 INJECTION, SOLUTION INTRAMUSCULAR; INTRAVENOUS; SUBCUTANEOUS at 15:15

## 2022-10-06 RX ADMIN — HYDROMORPHONE HYDROCHLORIDE 0.5 MG: 1 INJECTION, SOLUTION INTRAMUSCULAR; INTRAVENOUS; SUBCUTANEOUS at 13:56

## 2022-10-06 RX ADMIN — OXYCODONE HYDROCHLORIDE 10 MG: 5 TABLET ORAL at 22:01

## 2022-10-06 RX ADMIN — FENTANYL CITRATE 25 MCG: 50 INJECTION, SOLUTION INTRAMUSCULAR; INTRAVENOUS at 15:00

## 2022-10-06 RX ADMIN — IBUPROFEN 800 MG: 400 TABLET, FILM COATED ORAL at 22:01

## 2022-10-06 RX ADMIN — OXYCODONE HYDROCHLORIDE 5 MG: 5 TABLET ORAL at 17:18

## 2022-10-06 ASSESSMENT — ACTIVITIES OF DAILY LIVING (ADL)
ADLS_ACUITY_SCORE: 18

## 2022-10-06 ASSESSMENT — ENCOUNTER SYMPTOMS: SEIZURES: 0

## 2022-10-06 ASSESSMENT — LIFESTYLE VARIABLES: TOBACCO_USE: 0

## 2022-10-06 NOTE — ANESTHESIA POSTPROCEDURE EVALUATION
Patient: Abi Dawkins    Procedure: * No procedures listed *       Anesthesia Type:  Epidural    Note:  Disposition: Inpatient   Postop Pain Control: Uneventful            Sign Out: Well controlled pain   PONV: No   Neuro/Psych: Uneventful            Sign Out: Acceptable/Baseline neuro status   Airway/Respiratory: Uneventful            Sign Out: Acceptable/Baseline resp. status   CV/Hemodynamics: Uneventful            Sign Out: Acceptable CV status; No obvious hypovolemia; No obvious fluid overload   Other NRE: NONE   DID A NON-ROUTINE EVENT OCCUR? No           Last vitals:  Vitals:    10/04/22 2007 10/05/22 0801 10/05/22 1535   BP: 130/86 (!) 123/91 129/86   Pulse: 75 55 56   Resp: 16 16 16   Temp: 36.9  C (98.4  F) 36.8  C (98.3  F) 36.7  C (98  F)   SpO2:          Electronically Signed By: Karolina Cline MD  October 5, 2022  8:13 PM

## 2022-10-06 NOTE — PROGRESS NOTES
[facility]  Gastroenterology Progress Note     Abi Dawkins MRN# [id number]   YOB: 1985 Age: 37 year old          Assessment and Plan:   Abi Dawkins is a 37 year old female whom is postpartum day #1 with c/o RUQ pain.     RUQ pain  Cholelithiasis  Elevated LFTs  Abdominal ultrasound on 10/2/22 noted cholelithiasis with 3 mm CBD  LFTs: Alkaline phosphatase 238->218. ALT/AST 81/62->81/54. Total bilirubin 1.0.   Patient likely has cholelithiasis and possible choledocholithiasis.  10/5 MRCP negative for choledocholithiasis     -- Appreciate surgery consult surgery  -- recommend repeat hepatic panel this a.m.  -- GI will sign off as no evidence of choledocholithiasis on MRCP                  Interval History:   no new complaints and doing well; no cp, sob, n/v/d, or abd pain.              Review of Systems:   C: NEGATIVE for fever, chills, change in weight  E/M: NEGATIVE for ear, mouth and throat problems  R: NEGATIVE for significant cough or SOB  CV: NEGATIVE for chest pain, palpitations or peripheral edema             Medications:   I have reviewed this patient's current medications    docusate sodium  100 mg Oral Daily     escitalopram  20 mg Oral Daily                  Physical Exam:   Vitals were reviewed  Vital Signs with Ranges  Temp:  [97.8  F (36.6  C)-98  F (36.7  C)] 97.9  F (36.6  C)  Pulse:  [56] 56  Resp:  [16] 16  BP: (125-130)/(86-89) 125/89  No intake/output data recorded.  Constitutional: healthy, alert and no distress   Cardiovascular: negative, PMI normal. No lifts, heaves, or thrills. RRR. No murmurs, clicks gallops or rub  Respiratory: negative, Percussion normal. Good diaphragmatic excursion. Lungs clear  Abdomen: Abdomen soft, non-tender. BS normal. No masses, organomegaly           Data:   I reviewed the patient's new clinical lab test results.   Recent Labs   Lab Test 10/05/22  0745 10/04/22  0837 10/02/22  1129   WBC 8.9 6.5 10.3   HGB 12.0 11.8 13.2    MCV 91 89 90    257 286     Recent Labs   Lab Test 10/05/22  0745 09/21/18  0934 03/08/16 2124   POTASSIUM 3.9 4.5 3.6   CHLORIDE 111* 102 105   CO2 20 31 25   BUN 10 19 12   ANIONGAP 9 6 7     Recent Labs   Lab Test 10/05/22  0745 10/04/22  1913 10/02/22  1129 03/03/22  1321 09/21/18  0934 03/08/16 2124 03/08/16 2124 03/08/16 2123 02/23/16  1516   ALBUMIN 2.2* 2.3*  --   --  4.0   < > 3.7  --   --    BILITOTAL 1.0 1.3  --   --  0.6   < > 0.8  --   --    ALT 81* 81* 50  --  22   < > 34  --   --    AST 54* 62* 31  --  23   < > 19  --   --    PROTEIN  --   --   --  Negative  --   --   --  Negative Negative   LIPASE  --   --  198  --   --   --  212  --   --    AMYLASE  --   --  77  --   --   --   --   --   --     < > = values in this interval not displayed.       I reviewed the patient's new imaging results.    All laboratory data reviewed  All imaging studies reviewed by me.    Zonia Zarate PA-C,  10/6/2022  [facility]  Office : [phone]  Cell: [phone] (Dr. Oliveros)  Cell: [phone] (Zonia Zarate PA-C)

## 2022-10-06 NOTE — OP NOTE
Procedure Date: 10/06/22      STAFF SURGEON:  Zaynab An MD      ASSISTANT:  Barbara Best PA-C     PREOPERATIVE DIAGNOSIS:   Symptomatic cholelithiasis with elevated LFTs     POSTOPERATIVE DIAGNOSIS:   Same     PROCEDURES PERFORMED:  Robotic cholecystectomy with intraoperative cholangiograms     INDICATIONS:   Abi is a 37-year-old female who presented with intermittent biliary colic, increasing in frequency and severity throughout her pregnancy.  Following vaginal delivery, the patient was evaluated and right upper quadrant ultrasound which demonstrated cholelithiasis.  LFT's were elevated.  MRCP was completed and did not demonstrate any evidence of choledocholithiasis. Due to the persistence of the patient's symptoms, the decision was made for the patient to proceed to the operating room for robotic cholecystectomy with intraoperative cholangiograms.  The risks and benefits of the procedure were discussed with the patient and consent for the procedure was obtained.      ANESTHESIA:  General.      DESCRIPTION OF PROCEDURE:  The patient was brought to the preoperative area and preoperative antibiotics were administered.  The patient also received preoperative indocyanine green injection.  The patient was then taken back to the operating room and placed in the supine position on the operating table.  A timeout was completed.  Anesthesia was induced and the patient was intubated.  The patient was secured to the operating table and her pressure points were padded.  The patient's abdomen was prepped and draped in a sterile fashion.        Following infiltration with local anesthetic, the 11 blade scalpel was used to make a small left upper quadrant incision.  Entrance into the abdomen was then gained under direct visualization using the 5 mm Visiport and the 5 mm laparoscope.  When the patient's abdomen had been safely entered, it was fully insufflated.  The laparoscope was then reinserted into the abdominal  cavity and initial inspection revealed no evidence of injury at the port entry site.  Under direct visualization, 3 additional 8 mm robotic ports were placed extending in a line towards the patient's right mid abdomen.  The patient's left upper quadrant 5 mm port was then upsized to an 8 mm robotic port.  The patient was then placed into the reverse Trendelenburg position with a slight left tilt.  The robot was then brought onto the field and docked.  The robotic tip up grasper, robotic fenestrated bipolar grasper, and the robotic scissors were introduced into the abdominal cavity under direct visualization.  The tip up grasper was used to grasp the dome of the gallbladder, which was then reflected cephalad.  The fenestrated grasper was used to grasp the infundibulum of the gallbladder.  The robotic scissors was then used to carefully dissect out the patient's cystic duct and cystic artery.  Firefly visualization was utilized throughout the dissection to confirm our anatomy.  When the patient's cystic duct and cystic artery had been completely dissected out and the critical view had been obtained the Martines clamp was brought in through the right lateral port.  The cholangiogram catheter was advanced into the cystic duct.  We then obtained intraoperative cholangiograms.  No obvious evidence of ductal obstruction or abnormality were visualized.  There was some possible common duct sludge that may have flushed into the duodenum.  The cholangiogram catheter and Martines clamp were then removed from the abdominal cavity.  The cystic duct was doubly clipped proximally, singly clipped distally and divided.  The cystic artery was clipped proximally and distally and then divided utilizing the electrocautery on the robotic scissors.  The gallbladder was then carefully  free from its attachments to the liver bed utilizing the robotic scissors and electrocautery.  When the gallbladder had been completely dissected free, it  was placed into an Endocatch bag and removed through the patient's left upper quadrant port site.  The patient's right upper quadrant was again surveyed.  The cystic duct and cystic artery clips were found to be intact.  There was no significant bleeding from the liver bed.  Hemostasis was ensured.    The patient's left upper quadrant port was removed.  The fascia at the port site was then reapproximated using a figure-of-eight 0 Vicryl suture and the Mario-Becka fascial closure device.     The remaining robotic equipment was removed from the abdominal cavity and the robot was undocked.  The patient's abdomen was allowed to desufflate fully.  The ports were removed and the port sites inspected and hemostasis was ensured.  The port sites were then reapproximated using 4-0 Monocryl subcuticular stitches.  The incisions were washed and dried and skin glue was applied.  Lap, needle and instrument counts were correct at the end of the procedure.  The patient tolerated the procedure well and was taken to the recovery area in stable condition.     Barbara Best PA-C assisted in the above procedure. They provided assistance with pre-operative positioning, prepping, and draping of the patient. The assistant provided vital operative assistance with retraction using instruments thus providing the necessary exposure and visualization for the case, manipulation of tissues to achieve hemostasis, and assisted in closure of the wound. Post-operatively they assisted in transfer of the patient off the operative table and transition to the PACU physicians.       ESTIMATED BLOOD LOSS:  15 mL      FINDINGS:  Normal-appearing biliary anatomy.    Intraoperative cholangiograms obtained, without evidence of ductal obstruction or abnormality.  Possible common duct sludge flushed into the duodenum.     COMPLICATIONS:  None.      SPECIMENS:  Gallbladder and contents.      DRAINS:  None.      CONDITION:  The patient will be readmitted to  the postpartum unit with instructions for postoperative cares and medications for pain management.         ANGEL MEANS MD

## 2022-10-06 NOTE — PROGRESS NOTES
[facility]    General Surgery  Daily Note       Assessment and Plan:   Abi Dawkins is a 37 year old female with symptomatic cholelithiasis and elevated LFTs    -Repeat hepatic panel this a.m.  -Proceed to the OR today for robotic cholecystectomy with intraoperative cholangiograms.  Patient in agreement with proceeding.        Interval History:   Patient remained stable.  No significant biliary colic overnight.  Surgical plan and anticipated recovery extensively discussed today at bedside with the patient and her spouse.           Physical Exam:   Temp: 97.8  F (36.6  C) Temp src: Oral BP: 130/89 Pulse: 56   Resp: 16          No intake/output data recorded.      Constitutional: healthy, alert and no distress   Respiratory: Breathing nonlabored      Data   Recent Labs   Lab 10/05/22  0745 10/04/22  1913 10/04/22  0837 10/02/22  1129   WBC 8.9  --  6.5 10.3   HGB 12.0  --  11.8 13.2   MCV 91  --  89 90     --  257 286     --   --   --    POTASSIUM 3.9  --   --   --    CHLORIDE 111*  --   --   --    CO2 20  --   --   --    BUN 10  --   --   --    CR 0.84  --   --  0.96   ANIONGAP 9  --   --   --    ISHAN 9.2  --   --   --    GLC 82  --   --   --    ALBUMIN 2.2* 2.3*  --   --    PROTTOTAL 5.8* 6.1*  --   --    BILITOTAL 1.0 1.3  --   --    ALKPHOS 218* 238*  --   --    ALT 81* 81*  --  50   AST 54* 62*  --  31       Zaynab An MD

## 2022-10-06 NOTE — ANESTHESIA POSTPROCEDURE EVALUATION
Patient: Abi Dawkins    Procedure: Procedure(s):  Robotic cholecystectomy with intraoperative cholangiograms       Anesthesia Type:  General    Note:     Postop Pain Control: Uneventful            Sign Out: Well controlled pain   PONV: No   Neuro/Psych: Uneventful            Sign Out: Acceptable/Baseline neuro status   Airway/Respiratory: Uneventful            Sign Out: Acceptable/Baseline resp. status   CV/Hemodynamics: Uneventful            Sign Out: Acceptable CV status; No obvious hypovolemia; No obvious fluid overload   Other NRE: NONE   DID A NON-ROUTINE EVENT OCCUR? No           Last vitals:  Vitals Value Taken Time   /94 10/06/22 1545   Temp 37.3  C (99.2  F) 10/06/22 1452   Pulse 68 10/06/22 1554   Resp 18 10/06/22 1554   SpO2 94 % 10/06/22 1554   Vitals shown include unvalidated device data.    Electronically Signed By: James Garcias MD  October 6, 2022  3:55 PM

## 2022-10-06 NOTE — PLAN OF CARE
Vitally stable. Independent in room. Denies pain. Bonding well with baby. Pumping and bottle feeding Sim.

## 2022-10-06 NOTE — BRIEF OP NOTE
[facility]    Brief Operative Note    Pre-operative diagnosis: Symptomatic cholelithiasis  Post-operative diagnosis: Same    Procedure:    Robotic cholecystectomy with intraoperative cholangiograms    Surgeon:       * Zaynab An MD - Primary     * Barbara Best PA-C - Assisting     Anesthesia: General     Estimated Blood Loss: 15cc    Specimens:   ID Type Source Tests Collected by Time Destination   1 : GALLBLADDER AND CONTENTS Tissue Gallbladder SURGICAL PATHOLOGY EXAM Zaynab An MD 10/6/2022  2:25 PM      Findings:    As above. Cholangiogram with possible sludge that flushed through, good filling of small intestine, no evidence for common duct stone. No immediate complications. See operative report for full details.      Barbara Best PA-C  [facility]  [phone]

## 2022-10-06 NOTE — ANESTHESIA PREPROCEDURE EVALUATION
Anesthesia Pre-Procedure Evaluation    Patient: Abi Dawkins   MRN: [id number] : 1985        Procedure : Procedure(s):  Robotic cholecystectomy with intraoperative cholangiograms          Past Medical History:   Diagnosis Date     Anxiety      Cholesteatoma 2012     COVID-19 2022    Sx's started Friday fatigue, chills, headache, runny nose. Saturday started coughing, weird episode of shakes-intense shivering. Yesterday loss sense of taste. No fevers. No known exposure     Hypertension     GHTN with second pregnancy     Major depressive disorder, recurrent episode, mild (H)      Migraines       Past Surgical History:   Procedure Laterality Date     ENT SURGERY  2011    typanamastoidectomy     ENT SURGERY Left [id number]    10 T-tube placements     LASIK CUSTOMVUE BILATERAL Bilateral 2015    Procedure: LASIK CUSTOMVUE BILATERAL;  Surgeon: Mayocl Bella MD;  Location:  [facility]     WAVESCAN SCREENING Bilateral 2015    Procedure: WAVESCAN SCREENING;  Surgeon: Maycol Bella MD;  Location:  [facility]      Allergies   Allergen Reactions     Cats      Runny nose, redness under eyes, sniffling      Social History     Tobacco Use     Smoking status: Never Smoker     Smokeless tobacco: Never Used   Substance Use Topics     Alcohol use: Not Currently     Comment: one drink      Wt Readings from Last 1 Encounters:   10/04/22 108.9 kg (240 lb)        Anesthesia Evaluation   Pt has had prior anesthetic.     History of anesthetic complications  - PONV.      ROS/MED HX  ENT/Pulmonary:    (-) tobacco use and sleep apnea   Neurologic:     (+) migraines,  (-) no seizures   Cardiovascular:     (+) hypertension-----    METS/Exercise Tolerance:     Hematologic:       Musculoskeletal:       GI/Hepatic:    (-) GERD and liver disease   Renal/Genitourinary:       Endo:     (+) Obesity,     Psychiatric/Substance Use:     (+) psychiatric history anxiety and depression     Infectious Disease:       Malignancy:        Other: Comment: Post partum day 2           Physical Exam    Airway        Mallampati: III   TM distance: > 3 FB   Neck ROM: full   Mouth opening: > 3 cm    Respiratory Devices and Support         Dental  no notable dental history         Cardiovascular   cardiovascular exam normal          Pulmonary   pulmonary exam normal                OUTSIDE LABS:  CBC:   Lab Results   Component Value Date    WBC 8.9 10/05/2022    WBC 6.5 10/04/2022    HGB 12.0 10/05/2022    HGB 11.8 10/04/2022    HCT 35.7 10/05/2022    HCT 34.2 (L) 10/04/2022     10/05/2022     10/04/2022     BMP:   Lab Results   Component Value Date     10/05/2022     09/21/2018    POTASSIUM 3.9 10/05/2022    POTASSIUM 4.5 09/21/2018    CHLORIDE 111 (H) 10/05/2022    CHLORIDE 102 09/21/2018    CO2 20 10/05/2022    CO2 31 09/21/2018    BUN 10 10/05/2022    BUN 19 09/21/2018    CR 0.84 10/05/2022    CR 0.96 10/02/2022    GLC 82 10/05/2022    GLC 69 (L) 09/21/2018     COAGS: No results found for: PTT, INR, FIBR  POC:   Lab Results   Component Value Date     (H) 10/04/2016     HEPATIC:   Lab Results   Component Value Date    ALBUMIN 2.2 (L) 10/05/2022    ALBUMIN 2.2 (L) 10/05/2022    PROTTOTAL 5.8 (L) 10/05/2022    PROTTOTAL 5.8 (L) 10/05/2022    ALT 81 (H) 10/05/2022    ALT 81 (H) 10/05/2022    AST 54 (H) 10/05/2022    AST 54 (H) 10/05/2022    ALKPHOS 218 (H) 10/05/2022    ALKPHOS 218 (H) 10/05/2022    BILITOTAL 1.0 10/05/2022    BILITOTAL 1.0 10/05/2022     OTHER:   Lab Results   Component Value Date    A1C 5.0 09/21/2018    ISHAN 9.2 10/05/2022    LIPASE 198 10/02/2022    AMYLASE 77 10/02/2022    TSH 0.95 12/06/2019       Anesthesia Plan    ASA Status:  2   NPO Status:  NPO Appropriate    Anesthesia Type: General.     - Airway: ETT   Induction: Intravenous, RSI.   Maintenance: TIVA.   Techniques and Equipment:     - Airway: Video-Laryngoscope         Consents    Anesthesia Plan(s) and associated risks, benefits, and realistic  alternatives discussed. Questions answered and patient/representative(s) expressed understanding.    - Discussed:     - Discussed with:  Patient, Spouse         Postoperative Care    Pain management: Multi-modal analgesia.   PONV prophylaxis: Ondansetron (or other 5HT-3), Dexamethasone or Solumedrol, Background Propofol Infusion     Comments:                James Garcias MD

## 2022-10-06 NOTE — ANESTHESIA CARE TRANSFER NOTE
Patient: Abi Dawkins    Procedure: Procedure(s):  Robotic cholecystectomy with intraoperative cholangiograms       Diagnosis: Calculus of gallbladder without cholecystitis without obstruction [K80.20]  Diagnosis Additional Information: No value filed.    Anesthesia Type:   General     Note:    Oropharynx: oropharynx clear of all foreign objects  Level of Consciousness: awake  Oxygen Supplementation: face mask  Level of Supplemental Oxygen (L/min / FiO2): 6  Independent Airway: airway patency satisfactory and stable  Dentition: dentition unchanged      Patient transferred to: PACU    Handoff Report: Identifed the Patient, Identified the Reponsible Provider, Reviewed the pertinent medical history, Discussed the surgical course, Reviewed Intra-OP anesthesia mangement and issues during anesthesia, Set expectations for post-procedure period and Allowed opportunity for questions and acknowledgement of understanding      Vitals:  Vitals Value Taken Time   BP     Temp     Pulse     Resp     SpO2         Electronically Signed By: MORRIS Gibbs CRNA  October 6, 2022  2:47 PM

## 2022-10-06 NOTE — PROGRESS NOTES
[facility]    Post-Partum Progress Note    Assessment & Plan   Assessment:  Post-partum day #2  Normal spontaneous vaginal delivery  Acute biliary colic    Doing well.  No excessive bleeding  Pain well-controlled.    Plan:  Ambulation encouraged  Lactation consultation  Monitor wound for signs of infection  Pain control measures as needed  Reportable signs and symptoms dicussed with the patient  Planning for cholecystectomy with Dr. An today; appreciate Dr. An's cares.  Dispo planning per gen surg team as from OB standpoint, ready for discharge.  Anticipate discharge tomorrow AM    Zonia Hunter MD     Interval History   Doing well.  Pain is well-controlled --some cramping with pumping.  No fevers.  No history of foul-smelling vaginal discharge.  Good appetite --NPO since midnight.  No n/v.  Denies chest pain, shortness of breath, nausea or vomiting.  Vaginal bleeding is similar to a heavy menstrual flow.  Ambulatory without issues.  Pumping with small amounts of colostrum.  Anxious for surgery today    Medications     - MEDICATION INSTRUCTIONS -       - MEDICATION INSTRUCTIONS -       oxytocin in 0.9% NaCl         docusate sodium  100 mg Oral Daily     escitalopram  20 mg Oral Daily       Physical Exam   Temp: 97.8  F (36.6  C) Temp src: Oral BP: 130/89 Pulse: 56   Resp: 16        Vitals:    10/04/22 0806   Weight: 108.9 kg (240 lb)     Vital Signs with Ranges  Temp:  [97.8  F (36.6  C)-98.3  F (36.8  C)] 97.8  F (36.6  C)  Pulse:  [55-56] 56  Resp:  [16] 16  BP: (123-130)/(86-91) 130/89  No intake/output data recorded.    Uterine fundus is firm, non-tender and at the level of the umbilicus  Extremities Non-tender    Data   Recent Labs   Lab Test 10/04/22  0837 03/03/22  1423 10/03/16  0906   ABO  --   --  O   RH  --   --   Pos   AS Negative   < > Neg    < > = values in this interval not displayed.     Recent Labs   Lab Test 10/05/22  0745 10/04/22  0837   HGB 12.0 11.8      Recent Labs   Lab Test 03/03/22  1423   RUQIGG Positive

## 2022-10-06 NOTE — PLAN OF CARE
Vital signs stable. Went to surgery today to have her gall bladder removed. Arrived back to the unit around 1700. She arrived still having a lot of pain. Patient was given Oxycodone and she states that she feels the same but appears to be feeling much better. Patient ambulated to the bathroom with assist of 2 and voided without difficulty. Tolerating fluids and crackers. Infant was discharged today and is rooming in with his father. Rooming in agreement discussed and both parents verbalized understanding. Encouraged to call with questions/concerns. Will continue to monitor.

## 2022-10-06 NOTE — ANESTHESIA PROCEDURE NOTES
Airway       Patient location during procedure: OR       Procedure Start/Stop Times: 10/6/2022 12:56 PM  Staff -        CRNA: Lina Abad APRN CRNA       Performed By: CRNA  Consent for Airway        Urgency: elective  Indications and Patient Condition       Indications for airway management: karmen-procedural       Induction type:RSI       Mask difficulty assessment: 0 - not attempted    Final Airway Details       Final airway type: endotracheal airway       Successful airway: ETT - single  Endotracheal Airway Details        ETT size (mm): 7.0       Successful intubation technique: video laryngoscopy       VL Blade Size: Mak 3       Grade View of Cords: 1       Adjucts: stylet       Position: Right       Measured from: gums/teeth       Secured at (cm): 21       Bite block used: None    Post intubation assessment        Placement verified by: capnometry, equal breath sounds and chest rise        Number of attempts at approach: 1       Secured with: pink tape       Ease of procedure: easy       Dentition: Intact and Unchanged    Medication(s) Administered   Medication Administration Time: 10/6/2022 12:56 PM

## 2022-10-07 VITALS
HEIGHT: 66 IN | HEART RATE: 58 BPM | DIASTOLIC BLOOD PRESSURE: 85 MMHG | SYSTOLIC BLOOD PRESSURE: 118 MMHG | OXYGEN SATURATION: 95 % | WEIGHT: 240 LBS | TEMPERATURE: 97.9 F | RESPIRATION RATE: 16 BRPM | BODY MASS INDEX: 38.57 KG/M2

## 2022-10-07 LAB
ALBUMIN SERPL-MCNC: 2.2 G/DL (ref 3.4–5)
ALP SERPL-CCNC: 164 U/L (ref 40–150)
ALT SERPL W P-5'-P-CCNC: 81 U/L (ref 0–50)
AST SERPL W P-5'-P-CCNC: 52 U/L (ref 0–45)
BILIRUB DIRECT SERPL-MCNC: 0.2 MG/DL (ref 0–0.2)
BILIRUB SERPL-MCNC: 0.4 MG/DL (ref 0.2–1.3)
PROT SERPL-MCNC: 5.5 G/DL (ref 6.8–8.8)

## 2022-10-07 PROCEDURE — 250N000013 HC RX MED GY IP 250 OP 250 PS 637: Performed by: PHYSICIAN ASSISTANT

## 2022-10-07 PROCEDURE — 36415 COLL VENOUS BLD VENIPUNCTURE: CPT | Performed by: PHYSICIAN ASSISTANT

## 2022-10-07 PROCEDURE — 82040 ASSAY OF SERUM ALBUMIN: CPT | Performed by: PHYSICIAN ASSISTANT

## 2022-10-07 RX ORDER — POLYETHYLENE GLYCOL 3350 17 G/17G
17 POWDER, FOR SOLUTION ORAL DAILY
Qty: 510 G | Refills: 0 | Status: SHIPPED | OUTPATIENT
Start: 2022-10-07 | End: 2023-04-21

## 2022-10-07 RX ORDER — CYCLOBENZAPRINE HCL 5 MG
5-10 TABLET ORAL 3 TIMES DAILY PRN
Status: DISCONTINUED | OUTPATIENT
Start: 2022-10-07 | End: 2022-10-07 | Stop reason: HOSPADM

## 2022-10-07 RX ORDER — IBUPROFEN 800 MG/1
800 TABLET, FILM COATED ORAL EVERY 8 HOURS PRN
Qty: 30 TABLET | Refills: 0 | Status: SHIPPED | OUTPATIENT
Start: 2022-10-07 | End: 2022-11-29

## 2022-10-07 RX ORDER — CYCLOBENZAPRINE HCL 5 MG
5-10 TABLET ORAL 3 TIMES DAILY PRN
Qty: 12 TABLET | Refills: 0 | Status: SHIPPED | OUTPATIENT
Start: 2022-10-07 | End: 2022-11-29

## 2022-10-07 RX ORDER — OXYCODONE HYDROCHLORIDE 5 MG/1
5-10 TABLET ORAL EVERY 4 HOURS PRN
Qty: 12 TABLET | Refills: 0 | Status: SHIPPED | OUTPATIENT
Start: 2022-10-07 | End: 2022-11-29

## 2022-10-07 RX ORDER — ACETAMINOPHEN 325 MG/1
650 TABLET ORAL EVERY 6 HOURS PRN
Qty: 100 TABLET | Refills: 0 | Status: SHIPPED | OUTPATIENT
Start: 2022-10-07 | End: 2022-11-29

## 2022-10-07 RX ADMIN — ACETAMINOPHEN 650 MG: 325 TABLET, FILM COATED ORAL at 04:04

## 2022-10-07 RX ADMIN — IBUPROFEN 800 MG: 400 TABLET, FILM COATED ORAL at 04:04

## 2022-10-07 RX ADMIN — DOCUSATE SODIUM 100 MG: 100 CAPSULE, LIQUID FILLED ORAL at 09:14

## 2022-10-07 RX ADMIN — ACETAMINOPHEN 650 MG: 325 TABLET, FILM COATED ORAL at 00:17

## 2022-10-07 RX ADMIN — ACETAMINOPHEN 650 MG: 325 TABLET, FILM COATED ORAL at 09:14

## 2022-10-07 RX ADMIN — IBUPROFEN 800 MG: 400 TABLET, FILM COATED ORAL at 10:39

## 2022-10-07 RX ADMIN — OXYCODONE HYDROCHLORIDE 10 MG: 5 TABLET ORAL at 10:40

## 2022-10-07 RX ADMIN — OXYCODONE HYDROCHLORIDE 10 MG: 5 TABLET ORAL at 06:30

## 2022-10-07 RX ADMIN — OXYCODONE HYDROCHLORIDE 10 MG: 5 TABLET ORAL at 02:09

## 2022-10-07 RX ADMIN — ESCITALOPRAM OXALATE 20 MG: 20 TABLET ORAL at 09:14

## 2022-10-07 RX ADMIN — POLYETHYLENE GLYCOL 3350 17 G: 17 POWDER, FOR SOLUTION ORAL at 09:27

## 2022-10-07 ASSESSMENT — ACTIVITIES OF DAILY LIVING (ADL)
ADLS_ACUITY_SCORE: 19
ADLS_ACUITY_SCORE: 19
ADLS_ACUITY_SCORE: 18
ADLS_ACUITY_SCORE: 19

## 2022-10-07 NOTE — DISCHARGE INSTRUCTIONS
[facility]  Discharge Instructions: Post-Operative Laparoscopic Cholecystectomy    ACTIVITY  Expect to feel tired after your surgery.  This will gradually resolve.    Take frequent, short walks and increase your activity gradually.    Avoid strenuous physical activity or heavy lifting greater than 15-20 lbs. for 2-3 weeks.  You may climb stairs.  You may drive without restrictions when you are not using any prescription pain medication and feel comfortable in a car.  You may return to work/school when you are comfortable without any prescription pain medication.    WOUND CARE  You may shower 48 hours after the surgery.  Pat your incisions dry and leave them open to air.  Re-apply dressing (Band-Aids or gauze/tape) as needed for comfort or drainage.  You have skin glue on your incisions. Do not pick at the glue; it will slowly peel up and fall off on its own.  Do not soak your incisions in a tub or pool for 2 weeks.   Do not apply any lotions, creams, or ointments to your incisions.  A ridge under your incisions is normal and will gradually resolve.    DIET  Start with liquids, then gradually resume your regular diet as tolerated.  Avoid heavy, spicy, and greasy meals for 2-3 days.  Drink plenty of fluids to stay hydrated.  It is not uncommon to experience some loose stools or diarrhea after surgery.  This is your body's way of adapting to the bile which will slowly drain into your intestine.  A low fat diet may help with this.  This should improve over 1-2 months.    PAIN  Expect some tenderness and discomfort at the incision sites.  Use the prescribed pain medications at your discretion.  Expect gradual resolution of your pain over several days.  You may take ibuprofen with food (unless you have been told not to) and acetaminophen/Tylenol instead of or in addition to your prescribed pain medications.  Do not drink alcohol or drive while you are taking pain medications.  You may apply  ice to your incisions in 20 minute intervals as needed for the next 48 hours.  After that time, consider switching to heat if you prefer.    EXPECTATIONS  Pain medications can cause constipation.  Limit use when possible.  Take the Miralax daily with plenty of water. You may take additional stool softeners / laxatives if needed. You may discontinue these medications once you are having regular bowel movements and/or are no longer taking your narcotic pain medication.    You may have shoulder or upper back discomfort due to the gas used in surgery.  This is temporary and should resolve in 48-72 hours.  Short, frequent walks may help with this.  If you are unable to urinate, or feel as though you are not emptying your bladder adequately, we recommend you call our office and/or seek care at an ER or Urgent Care facility if after hours.    FOLLOW UP  Our office will contact you in approximately 2 weeks to check on your progress and answer any questions you may have.  If you are doing well, you will not need to return for a follow up appointment.  If any concerns are identified over the phone, we will help you make an appointment to see a provider.   If you have not received a phone call, have any questions or concerns, or would like to be seen, please call us at [phone] and ask to speak with our nurse.  We are located at [address] [place] [address].    CALL OUR OFFICE -[phone] IF YOU HAVE:   Chills or fever above 101 F.  Increased redness, warmth, or drainage at your incisions.  Significant bleeding.  Pain not relieved by your pain medication or rest.  Increasing pain after the first 48 hours.  Any other concerns or questions.        Postpartum Vaginal Delivery Instructions    Activity     Ask family and friends for help when you need it.  Do not place anything in your vagina for 6 weeks.  You are not restricted on other activities, but take it easy for a few weeks to allow your body  to recover from delivery.  You are able to do any activities you feel up to that point.  No driving until you have stopped taking your pain medications (usually two weeks after delivery).     Call your health care provider if you have any of these symptoms:     Increased pain, swelling, redness, or fluid around your stiches from an episiotomy or perineal tear.  A fever above 100.4 F (38 C) with or without chills when placing a thermometer under your tongue.  You soak a sanitary pad with blood within 1 hour, or you see blood clots larger than a golf ball.  Bleeding that lasts more than 6 weeks.  Vaginal discharge that smells bad.  Severe pain, cramping or tenderness in your lower belly area.  A need to urinate more frequently (use the toilet more often), more urgently (use the toilet very quickly), or it burns when you urinate.  Nausea and vomiting.  Redness, swelling or pain around a vein in your leg.  Problems breastfeeding or a red or painful area on your breast.  Chest pain and cough or are gasping for air.  Problems coping with sadness, anxiety, or depression.  If you have any concerns about hurting yourself or the baby, call your provider immediately.   You have questions or concerns after you return home.     Keep your hands clean:  Always wash your hands before touching your perineal area and stitches.  This helps reduce your risk of infection.  If your hands aren't dirty, you may use an alcohol hand-rub to clean your hands. Keep your nails clean and short.

## 2022-10-07 NOTE — PLAN OF CARE
D: VSS, assessments WDL.   I: Pt. received complete discharge paperwork and home medications as filled by discharge pharmacy.  Pt. was given times of last dose for all discharge medications in writing on discharge medication sheets.  Discharge teaching included home medication, pain management, activity restrictions, postpartum cares, and signs and symptoms of infection.    A: Discharge outcomes on care plan met.  Mother states understanding and comfort with self care and follow up care.   P: Pt. Discharged.  Pt. was accompanied by  and nursing assistant and left with personal belongings. Pt. to follow up with OB provider per discharge instructions.  Pt. had no further questions at the time of discharge and no unmet needs were identified.

## 2022-10-07 NOTE — PROGRESS NOTES
"General Surgery Progress Note    Admission Date: 10/4/2022  Today's Date: 10/7/2022         Assessment:      Abi Dawikns is a 37 year old female 3 days postpartum with symptomatic cholelithiasis and elevated LFTs now POD 1 s/p robotic cholecystectomy with intraoperative cholangiograms. No evidence of biliary obstruction or abnormality on cholangiogram.         Plan:   - Labs rechecked today - total and direct bilirubin normalized, remaining LFTs trending down  - Regular diet  - Pain meds prn. Add flexeril  - Daily miralax for bowel regimen  - Work on frequent out of bed and ambulating in halls at least QID, PCDs while resting   - Discharge instructions reviewed, questions answered. Follow-up with surgical team via phone or in clinic in ~2 weeks for recheck    Dispo: home today        Interval History:   Tmax 99.2 overnight, vitals stable, now on room air. Abi is very sore especially when moving, tylenol/oxy/ibuprofen helpful. No nausea, tolerating regular diet. Passing some gas.          Physical Exam:   /75 (BP Location: Right arm)   Pulse 61   Temp 97.9  F (36.6  C) (Axillary)   Resp 16   Ht 1.676 m (5' 6\")   Wt 108.9 kg (240 lb)   LMP 12/30/2021   SpO2 95%   Breastfeeding Unknown   BMI 38.74 kg/m    I/O last 3 completed shifts:  In: 1100 [I.V.:1100]  Out: -   General: NAD, pleasant, alert and oriented x3  Respiratory: non-labored breathing  Abdomen: not examined, patient in bed with infant resting on her  Extremities: scant symmetric bilateral lower extremity edema, no calf tenderness    LABS:  Recent Labs   Lab Test 10/05/22  0745 10/04/22  0837 10/02/22  1129   WBC 8.9 6.5 10.3   HGB 12.0 11.8 13.2   MCV 91 89 90    257 286      Recent Labs   Lab Test 10/05/22  0745 10/02/22  1129 12/06/19  1044 09/21/18  0934 03/08/16  2124   POTASSIUM 3.9  --   --  4.5 3.6   CHLORIDE 111*  --   --  102 105   CO2 20  --   --  31 25   BUN 10  --   --  19 12   CR 0.84 0.96 0.94 0.97 0.74   ANIONGAP 9  " --   --  6 7      Recent Labs   Lab Test 10/07/22  0550 10/05/22  0745 10/04/22  1913 10/02/22  1129   ALBUMIN 2.2* 2.2*  2.2* 2.3*  --    BILITOTAL 0.4 1.0  1.0 1.3  --    ALT 81* 81*  81* 81* 50   AST 52* 54*  54* 62* 31   ALKPHOS 164* 218*  218* 238*  --    AMYLASE  --   --   --  77     -------------------------------    Barbara Best PA-C  [facility]  [phone]

## 2022-10-07 NOTE — PROGRESS NOTES
[facility]    Post-Partum Progress Note    Assessment & Plan   Assessment:  Post-partum day #3  Normal spontaneous vaginal delivery  Robotic cholecystectomy    Doing well.  No immediate surgical complications identified.  No excessive bleeding  Pain well-controlled.    Plan:  Ambulation encouraged  Lactation consultation  Pain control measures as needed  Reportable signs and symptoms dicussed with the patient  Appreciate general surgery cares and management--okay for discharge  Discharge later today  Follow up with me in 6 weeks    Zonia Hunter MD     Interval History   Doing well.  Pain is well-controlled --fairly constant discomfort at LUQ incision.  No uterine cramping.  No fevers.  No history of foul-smelling vaginal discharge.  Good appetite.  Denies chest pain, shortness of breath, nausea or vomiting.  Vaginal bleeding is similar to a light menstrual flow.  Ambulatory to bathroom.  Hasn't pumped since surgery yesterday    Medications     - MEDICATION INSTRUCTIONS -       - MEDICATION INSTRUCTIONS -       oxytocin in 0.9% NaCl         docusate sodium  100 mg Oral Daily     escitalopram  20 mg Oral Daily     polyethylene glycol  17 g Oral Daily       Physical Exam   Temp: 97.9  F (36.6  C) Temp src: Axillary BP: 113/75 Pulse: 61   Resp: 16 SpO2: 95 % O2 Device: Nasal cannula Oxygen Delivery: 2 LPM  Vitals:    10/04/22 0806   Weight: 108.9 kg (240 lb)     Vital Signs with Ranges  Temp:  [97.8  F (36.6  C)-99.2  F (37.3  C)] 97.9  F (36.6  C)  Pulse:  [61-73] 61  Resp:  [9-23] 16  BP: (113-142)/(75-96) 113/75  SpO2:  [93 %-96 %] 95 %  I/O last 3 completed shifts:  In: 1100 [I.V.:1100]  Out: -     Uterine fundus is firm, non-tender and at the level of the umbilicus  Extremities Non-tender    Data   Recent Labs   Lab Test 10/04/22  0837 03/03/22  1423 10/03/16  0906   ABO  --   --  O   RH  --   --   Pos   AS Negative   < > Neg    < > = values in this interval not displayed.     Recent  Labs   Lab Test 10/05/22  0745 10/04/22  0837   HGB 12.0 11.8     Recent Labs   Lab Test 03/03/22  1423   RUQIGG Positive

## 2022-10-10 LAB
PATH REPORT.COMMENTS IMP SPEC: NORMAL
PATH REPORT.COMMENTS IMP SPEC: NORMAL
PATH REPORT.FINAL DX SPEC: NORMAL
PATH REPORT.GROSS SPEC: NORMAL
PATH REPORT.MICROSCOPIC SPEC OTHER STN: NORMAL
PATH REPORT.RELEVANT HX SPEC: NORMAL
PHOTO IMAGE: NORMAL

## 2022-10-11 NOTE — DISCHARGE SUMMARY
Service Date: 10/07/2022  Discharge Date: 10/07/2022    HOSPITAL COURSE:  Abi is a 37-year-old G2, who was admitted to the postpartum service following induction of labor secondary to mildly elevated blood pressures as well as acute biliary colic.  [facility] had [facility] prior to admission for acute right upper quadrant pain and was found to have gallstones.  At that time, she was also found to have mildly elevated blood pressures, which were difficult to determine whether they were pain related or potentially PIH related.  For this reason, she was brought back for repeat blood pressure check the following day.  With mildly elevated blood pressures, decision was made to move induction of labor early.  Abi delivered a healthy male infant via uncomplicated vaginal delivery in the afternoon hours of 10/04/2022.  On first evening following delivery, Abi had another acute episode of upper quadrant pain and gastroenterology as well as general surgery were consulted.  MRCP showed cholelithiasis and given her asymptomatic nature, decision was made to proceed with robotic cholecystectomy with Dr. An.  Abi underwent an uncomplicated cholecystectomy with Dr. An on the afternoon of 10/06/2022.  Please refer to operative dictation regarding details of this procedure.  Abi's postoperative hospital course was overall uncomplicated.  Pain was well controlled using a combination of oral and IV medications.  Abi remained hemodynamically stable throughout postoperative hospital stay with a postpartum hemoglobin of 12.0 g/dL.  Liver function tests, which had been slightly bumped prior to gallbladder surgery were found to be stable and slightly improved postoperative day #1.  Abi was discharged home on postoperative day #1 and postpartum day #3 after meeting all requirements for discharge.  She has verbalized understanding of discharge instructions and was given a list of contact  numbers should any issues or problems arise.  Ivonne will follow up with me for routine postpartum visit in 6 weeks' time.    Zonia Hunter MD        D: 10/11/2022   T: 10/11/2022   MT: [id number]    Name:     IVONNE DUARTE  MRN:      [id number]        Account:      [id number]   :      1985           Service Date: 10/07/2022                                  Discharge Date: 10/07/2022     Document: [id number]    cc:  Zonia Hunter MD

## 2022-10-25 ENCOUNTER — TELEPHONE (OUTPATIENT)
Dept: SURGERY | Facility: CLINIC | Age: 37
End: 2022-10-25

## 2022-10-26 NOTE — TELEPHONE ENCOUNTER
Name of caller: Patient    Reason for Call:  Symptoms- one of incision sites gets sharp pains especially when she coughs or sneezes. Is this normal? Is there something she can do for this?        Surgeon:  Juve    Recent Surgery:  Yes.    If yes, when & what type:  10/6/22    ROBOTIC ASSISTED LAP OSTO WITH INTRAOPERATIVE CHOLANGIOGRAM      Best phone number to reach pt at is: [phone]    Ok to leave a message with medical info? Yes.      
PROCEDURES PERFORMED:  Robotic cholecystectomy with intraoperative cholangiograms    Date: 10/06/2022    Surgeon: Juve    Attempted to call patient back to discuss. No answer.     Left message informing patient it could be related to the sutures/ongoing healing. Not concerning at this point.    Recommend she try utilizing heat over the area. Can also hold her hands or a pillow over the area when she coughs or sneezes to provide support to that area as she continues to heal    Will also discuss other recommendations/advice with Dr. An and call her back tomorrow    Call back number provided for any additional questions today    Ciara Platt, RN-BSN    
Per Dr. An:    I agree-sounds like normal healing.  Heat/ice/ibuprofen/Tylenol.  Thank you!     Left message for patient informing her Dr. an is in agreement with the recommendations/plan    Informed her that she will receive a follow up call from PA either this week or sometime next week    If she has any further question before then, she was encouraged to call back to discuss    Call back number provided    Ciara Platt RN-BSN    
General appearance: NAD, conversant, afebrile    Eyes: anicteric sclerae, moist conjunctivae; no lid-lag; Pupils equal, round and reactive to light; Extraocular muscles intact   HENT: Atraumatic; oropharynx clear with moist mucous membranes and no mucosal ulcerations; normal hard and soft palate; no pharyngeal erythema or exudate   Neck: Trachea midline; Full range of motion, supple; no thyromegaly or lymphadenopathy   Pulm: Lungs clear to auscultation bilaterally, with normal respiratory effort and no intercostal retractions; normal work of breathing   CV: Regular Rhythm and Rate; Normal S1, S2; No murmurs, rubs, or gallops.   Abdomen: Soft, non-tender, non-distended; no masses or hepatosplenomegaly.   Extremities: No peripheral edema or extremity lymphadenopathy. 5/5 strength in all four extremities.   Skin: Normal temperature, turgor    Psych: Appropriate affect, cooperative; alert and oriented to person, place and time. Linear train of thought.

## 2022-11-02 ENCOUNTER — TELEPHONE (OUTPATIENT)
Dept: SURGERY | Facility: CLINIC | Age: 37
End: 2022-11-02

## 2022-11-02 ENCOUNTER — E-VISIT (OUTPATIENT)
Dept: URGENT CARE | Facility: CLINIC | Age: 37
End: 2022-11-02
Payer: COMMERCIAL

## 2022-11-02 DIAGNOSIS — R07.0 THROAT PAIN: Primary | ICD-10-CM

## 2022-11-02 DIAGNOSIS — J02.0 ACUTE STREPTOCOCCAL PHARYNGITIS: ICD-10-CM

## 2022-11-02 PROCEDURE — 99421 OL DIG E/M SVC 5-10 MIN: CPT | Performed by: NURSE PRACTITIONER

## 2022-11-02 NOTE — TELEPHONE ENCOUNTER
Patient Name: Abi Dawkins  Today's Date: 11/02/22    Abi was called for routine postop check. She underwent robotic cholecystectomy with Dr. An on 10/6/22. She also delivered a healthy baby via spontaneous vaginal delivery on 10/4/22. Abi tells me today that she is overall doing well. She is eating a normal diet. She is moving her bowels but is still needing stool softeners / miralax to encourage regular, soft bowel movements. We discussed increasing oral hydration, fiber, stopping senna soon and relying more on the miralax going forward. She is lactating, which is likely contributing to some dehydration. She reports minimal pain after surgery other than some muscular pain at her left upper abdomen near her larger incision and fascial closure. She notices pain there mainly with certain movements, coughing, sneezing.    Overall Abi seems to be recovering well from her surgery. We reviewed postop cares going forward. I expect her to make a full recovery, and she will follow-up with us on an as needed basis.      Barbara Best PA-C  [facility]  [phone]

## 2022-11-03 ENCOUNTER — LAB (OUTPATIENT)
Dept: LAB | Facility: CLINIC | Age: 37
End: 2022-11-03
Payer: COMMERCIAL

## 2022-11-03 ENCOUNTER — TELEPHONE (OUTPATIENT)
Dept: NURSING | Facility: CLINIC | Age: 37
End: 2022-11-03

## 2022-11-03 DIAGNOSIS — R07.0 THROAT PAIN: ICD-10-CM

## 2022-11-03 LAB — DEPRECATED S PYO AG THROAT QL EIA: POSITIVE

## 2022-11-03 PROCEDURE — 87880 STREP A ASSAY W/OPTIC: CPT

## 2022-11-03 RX ORDER — PENICILLIN V POTASSIUM 500 MG/1
500 TABLET, FILM COATED ORAL 2 TIMES DAILY
Qty: 20 TABLET | Refills: 0 | Status: SHIPPED | OUTPATIENT
Start: 2022-11-03 | End: 2022-11-13

## 2022-11-03 NOTE — PATIENT INSTRUCTIONS
Abi,  I ordered a strep test for you. Make a lab only appointment through my chart. We will be in contact with you once the results are back

## 2022-11-03 NOTE — TELEPHONE ENCOUNTER
Pt is calling in about an e-visit she had today. Pt was then sent for a strep test. Pt would like to know if medication will be sent to her pharmacy.    Please call Abi at [phone] to advise.    Jose Licona RN on 11/3/2022 at 11:51 AM

## 2022-11-04 NOTE — TELEPHONE ENCOUNTER
Pt was called to make sure she picked up her prescription from her E-visit yesterday. Pt reports she did pick it up. Pt was advised to call back if she has further questions. Pt verbalized understanding.     Jose Licona RN on 11/4/2022 at 1:23 PM

## 2022-11-11 ENCOUNTER — TELEPHONE (OUTPATIENT)
Dept: OBGYN | Facility: CLINIC | Age: 37
End: 2022-11-11

## 2022-11-11 NOTE — TELEPHONE ENCOUNTER
Type of Paperwork received:  FMLA     Date Rcvd:  11/11/2022    Rcvd From (Company name): [employer]    Provider: Dale    Placed on Provider Cart Date:  11/11/2022

## 2022-11-29 ENCOUNTER — PRENATAL OFFICE VISIT (OUTPATIENT)
Dept: OBGYN | Facility: CLINIC | Age: 37
End: 2022-11-29
Payer: COMMERCIAL

## 2022-11-29 VITALS — DIASTOLIC BLOOD PRESSURE: 76 MMHG | WEIGHT: 224 LBS | SYSTOLIC BLOOD PRESSURE: 120 MMHG | BODY MASS INDEX: 36.15 KG/M2

## 2022-11-29 DIAGNOSIS — Z30.09 GENERAL COUNSELING AND ADVICE ON CONTRACEPTIVE MANAGEMENT: ICD-10-CM

## 2022-11-29 PROCEDURE — 99207 PR POST PARTUM EXAM: CPT | Performed by: OBSTETRICS & GYNECOLOGY

## 2022-11-29 ASSESSMENT — ANXIETY QUESTIONNAIRES
GAD7 TOTAL SCORE: 9
IF YOU CHECKED OFF ANY PROBLEMS ON THIS QUESTIONNAIRE, HOW DIFFICULT HAVE THESE PROBLEMS MADE IT FOR YOU TO DO YOUR WORK, TAKE CARE OF THINGS AT HOME, OR GET ALONG WITH OTHER PEOPLE: VERY DIFFICULT
2. NOT BEING ABLE TO STOP OR CONTROL WORRYING: SEVERAL DAYS
7. FEELING AFRAID AS IF SOMETHING AWFUL MIGHT HAPPEN: SEVERAL DAYS
5. BEING SO RESTLESS THAT IT IS HARD TO SIT STILL: NOT AT ALL
GAD7 TOTAL SCORE: 9
3. WORRYING TOO MUCH ABOUT DIFFERENT THINGS: SEVERAL DAYS
1. FEELING NERVOUS, ANXIOUS, OR ON EDGE: MORE THAN HALF THE DAYS
6. BECOMING EASILY ANNOYED OR IRRITABLE: NEARLY EVERY DAY

## 2022-11-29 ASSESSMENT — PATIENT HEALTH QUESTIONNAIRE - PHQ9
5. POOR APPETITE OR OVEREATING: SEVERAL DAYS
SUM OF ALL RESPONSES TO PHQ QUESTIONS 1-9: 4

## 2022-11-29 NOTE — PATIENT INSTRUCTIONS
Follow up with your primary care provider for your other medical problems.  Continue self breast exam.  Increase physical activity and exercise.  Usual safety and preventative measures counseling done.  BMI >25  Weight loss encouraged.  Last pap smear (2019) was normal and negative for the DNA of high risk HPV subtypes.  No pap was obtained this year.  This was discussed with the patient and she agrees with the plan.  Will likely return for Mirena IUD placement in the near future.  REcommend premedication with ibuprofen prior to that appointment.

## 2022-11-29 NOTE — PROGRESS NOTES
SUBJECTIVE:  Aib Dawkins,  is here for a postpartum visit.  She had a  on 10/4/22 delivering a healthy baby boy, named Manan weighing 8 lbs 1 oz at term.      HPI:  Here today for postpartum visit --doing well.  Baby Manan has been pretty good baby --a little fussy like his big brother.  Good eater and sleeper --both pumping and using formula ~80%/20%.  Going 3hrs between nighttime feedings.  Abi is doing well.  Eating/drinking well.  No bowel/bladder issues.  Stopped bleeding ~2wks ago.  Has not resumed SA.  Has used condoms in the past but considering Mirena IUD.  Moods good --tired but managing  -recovering well from her cholecystectomy; had virtual visit with general surgery  -returns to work mid January      Last PHQ-9 score on record=   PHQ-9 SCORE 2022   PHQ-9 Total Score -   PHQ-9 Total Score MyChart -   PHQ-9 Total Score 4     LUCERO-7 SCORE 2022   Total Score - - -   Total Score - 4 (minimal anxiety) -   Total Score 6 4 9       Pap:   Lab Results   Component Value Date    PAP NIL 10/01/2019    PAP NIL 2015   10/1/2019 WNL HPV (-)neg    Delivery complications:  No  Breast feeding:  Yes, exclusively pumping  Bladder problems:  No  Bowel problems/hemorrhoids:  Yes  Episiotomy/laceration/incision healed? Yes  Vaginal flow:  None  Time:  No  Contraception: IUD and unsure  Emotional adjustment:  doing well and happy  Back to work:  2023    12 point review of systems negative other than symptoms noted below or in the HPI.  Gastrointestinal: Constipation    OBJECTIVE:  Vitals: /76   Wt 101.6 kg (224 lb)   LMP 2021   Breastfeeding Yes   BMI 36.15 kg/m    BMI= Body mass index is 36.15 kg/m .  General - pleasant female in no acute distress.  Breast -  deferred  Abdomen - No incision  Pelvic - EG: normal adult female, BUS: within normal limits, Vagina: well rugated, no discharge, Cervix: no lesions or CMT, Uterus: firm, normal  sized and nontender, retroverted in position. Adnexae: no masses or tenderness.  Rectovaginal - deferred.    ASSESSMENT:    ICD-10-CM    1. Encounter for postpartum visit  Z39.2       2. General counseling and advice on contraceptive management  Z30.09           PLAN:  May resume normal activities without restrictions.  Pap smear was not done today.    Full counseling was provided, and all questions were answered.   Return to clinic in one year for an annual visit.     Patient Instructions   Follow up with your primary care provider for your other medical problems.  Continue self breast exam.  Increase physical activity and exercise.  Usual safety and preventative measures counseling done.  BMI >25  Weight loss encouraged.  Last pap smear (2019) was normal and negative for the DNA of high risk HPV subtypes.  No pap was obtained this year.  This was discussed with the patient and she agrees with the plan.  Will likely return for Mirena IUD placement in the near future.  REcommend premedication with ibuprofen prior to that appointment.     Zonia Hunter MD

## 2023-04-21 ENCOUNTER — OFFICE VISIT (OUTPATIENT)
Dept: FAMILY MEDICINE | Facility: CLINIC | Age: 38
End: 2023-04-21
Payer: COMMERCIAL

## 2023-04-21 VITALS
HEIGHT: 66 IN | SYSTOLIC BLOOD PRESSURE: 120 MMHG | BODY MASS INDEX: 36.48 KG/M2 | DIASTOLIC BLOOD PRESSURE: 88 MMHG | RESPIRATION RATE: 15 BRPM | OXYGEN SATURATION: 99 % | TEMPERATURE: 97.6 F | HEART RATE: 73 BPM | WEIGHT: 227 LBS

## 2023-04-21 DIAGNOSIS — Z86.32 HISTORY OF GESTATIONAL DIABETES: ICD-10-CM

## 2023-04-21 DIAGNOSIS — Z90.49 S/P CHOLECYSTECTOMY: ICD-10-CM

## 2023-04-21 DIAGNOSIS — F33.0 MAJOR DEPRESSIVE DISORDER, RECURRENT EPISODE, MILD (H): ICD-10-CM

## 2023-04-21 DIAGNOSIS — F41.9 ANXIETY: ICD-10-CM

## 2023-04-21 DIAGNOSIS — R79.89 ELEVATED LFTS: ICD-10-CM

## 2023-04-21 DIAGNOSIS — Z83.3 FH: DIABETES MELLITUS: ICD-10-CM

## 2023-04-21 DIAGNOSIS — R63.5 WEIGHT GAIN: ICD-10-CM

## 2023-04-21 DIAGNOSIS — Z00.00 ROUTINE GENERAL MEDICAL EXAMINATION AT A HEALTH CARE FACILITY: Primary | ICD-10-CM

## 2023-04-21 PROBLEM — H01.9 INFECTED EYE LID: Status: RESOLVED | Noted: 2019-03-14 | Resolved: 2023-04-21

## 2023-04-21 PROBLEM — O09.93 SUPERVISION OF HIGH RISK PREGNANCY IN THIRD TRIMESTER: Status: RESOLVED | Noted: 2022-10-04 | Resolved: 2023-04-21

## 2023-04-21 PROBLEM — O09.90 SUPERVISION OF HIGH-RISK PREGNANCY: Status: RESOLVED | Noted: 2022-02-24 | Resolved: 2023-04-21

## 2023-04-21 LAB
ALBUMIN SERPL BCG-MCNC: 4.5 G/DL (ref 3.5–5.2)
ALP SERPL-CCNC: 86 U/L (ref 35–104)
ALT SERPL W P-5'-P-CCNC: 15 U/L (ref 10–35)
ANION GAP SERPL CALCULATED.3IONS-SCNC: 14 MMOL/L (ref 7–15)
AST SERPL W P-5'-P-CCNC: 28 U/L (ref 10–35)
BILIRUB SERPL-MCNC: 0.5 MG/DL
BUN SERPL-MCNC: 15 MG/DL (ref 6–20)
CALCIUM SERPL-MCNC: 9.9 MG/DL (ref 8.6–10)
CHLORIDE SERPL-SCNC: 100 MMOL/L (ref 98–107)
CREAT SERPL-MCNC: 1.16 MG/DL (ref 0.51–0.95)
DEPRECATED HCO3 PLAS-SCNC: 24 MMOL/L (ref 22–29)
GFR SERPL CREATININE-BSD FRML MDRD: 62 ML/MIN/1.73M2
GLUCOSE SERPL-MCNC: 82 MG/DL (ref 70–99)
HBA1C MFR BLD: 5.3 % (ref 0–5.6)
POTASSIUM SERPL-SCNC: 4 MMOL/L (ref 3.4–5.3)
PROT SERPL-MCNC: 7.8 G/DL (ref 6.4–8.3)
SODIUM SERPL-SCNC: 138 MMOL/L (ref 136–145)
TSH SERPL DL<=0.005 MIU/L-ACNC: 1.71 UIU/ML (ref 0.3–4.2)

## 2023-04-21 PROCEDURE — 84443 ASSAY THYROID STIM HORMONE: CPT | Performed by: INTERNAL MEDICINE

## 2023-04-21 PROCEDURE — 99395 PREV VISIT EST AGE 18-39: CPT | Performed by: INTERNAL MEDICINE

## 2023-04-21 PROCEDURE — 36415 COLL VENOUS BLD VENIPUNCTURE: CPT | Performed by: INTERNAL MEDICINE

## 2023-04-21 PROCEDURE — 80053 COMPREHEN METABOLIC PANEL: CPT | Performed by: INTERNAL MEDICINE

## 2023-04-21 PROCEDURE — 99214 OFFICE O/P EST MOD 30 MIN: CPT | Mod: 25 | Performed by: INTERNAL MEDICINE

## 2023-04-21 PROCEDURE — 83036 HEMOGLOBIN GLYCOSYLATED A1C: CPT | Performed by: INTERNAL MEDICINE

## 2023-04-21 RX ORDER — PROPRANOLOL HYDROCHLORIDE 20 MG/1
TABLET ORAL
COMMUNITY
Start: 2023-01-19

## 2023-04-21 RX ORDER — QUETIAPINE FUMARATE 25 MG/1
25 TABLET, FILM COATED ORAL AT BEDTIME
COMMUNITY
Start: 2023-03-27 | End: 2023-11-08 | Stop reason: ALTCHOICE

## 2023-04-21 RX ORDER — LAMOTRIGINE 25 MG/1
2 TABLET ORAL
COMMUNITY
Start: 2023-03-27 | End: 2023-11-08

## 2023-04-21 ASSESSMENT — PAIN SCALES - GENERAL: PAINLEVEL: NO PAIN (0)

## 2023-04-21 NOTE — PROGRESS NOTES
SUBJECTIVE:   CC: Abi is an 38 year old who presents for preventive health visit.         4/21/2023    12:42 PM   Additional Questions   Roomed by Giana Quintana   Patient has been advised of split billing requirements and indicates understanding: Yes  History of Present Illness       Reason for visit:  Annual physical    She eats 2-3 servings of fruits and vegetables daily.She consumes 0 sweetened beverage(s) daily.She exercises with enough effort to increase her heart rate 10 to 19 minutes per day.  She exercises with enough effort to increase her heart rate 3 or less days per week.   She is taking medications regularly.      Today's PHQ-2 Score:       11/22/2022     9:46 AM   PHQ-2 ( 1999 [employer])   Q1: Little interest or pleasure in doing things 0   Q2: Feeling down, depressed or hopeless 1   PHQ-2 Score 1   Q1: Little interest or pleasure in doing things Not at all   Q2: Feeling down, depressed or hopeless Several days   PHQ-2 Score 1       Have you ever done Advance Care Planning? (For example, a Health Directive, POLST, or a discussion with a medical provider or your loved ones about your wishes): No, advance care planning information given to patient to review.  Patient plans to discuss their wishes with loved ones or provider.      Social History     Tobacco Use     Smoking status: Never     Smokeless tobacco: Never   Vaping Use     Vaping status: Never Used   Substance Use Topics     Alcohol use: Not Currently     Comment: one drink              View : No data to display.                   View : No data to display.              Reviewed orders with patient.  Reviewed health maintenance and updated orders accordingly - Yes  Lab work is in process  Labs reviewed in EPIC    Breast Cancer Screening:  Any new diagnosis of family breast, ovarian, or bowel cancer? No    FHS-7:        View : No data to display.              click delete button to remove this line now  Patient under 40 years of age: Routine  Mammogram Screening not recommended.   Pertinent mammograms are reviewed under the imaging tab.    History of abnormal Pap smear: NO - age 30-65 PAP every 5 years with negative HPV co-testing recommended      Latest Ref Rng & Units 10/1/2019    12:28 PM 10/1/2019    12:10 PM 2015    12:00 AM   PAP / HPV   PAP (Historical)  NIL    NIL     HPV 16 DNA NEG^Negative  Negative      HPV 18 DNA NEG^Negative  Negative      Other HR HPV NEG^Negative  Negative        Reviewed and updated as needed this visit by clinical staff   Tobacco  Allergies  Meds  Problems  Med Hx  Surg Hx  Fam Hx          Reviewed and updated as needed this visit by Provider   Tobacco  Allergies  Meds  Problems  Med Hx  Surg Hx  Fam Hx         Past Medical History:   Diagnosis Date     Anxiety      Cholesteatoma 2012     COVID-19 2022    Sx's started Friday fatigue, chills, headache, runny nose. Saturday started coughing, weird episode of shakes-intense shivering. Yesterday loss sense of taste. No fevers. No known exposure     Hypertension     GHTN with second pregnancy     Major depressive disorder, recurrent episode, mild (H)      Migraines       Past Surgical History:   Procedure Laterality Date     DAVINCI LAPAROSCOPIC CHOLECYSTECTOMY WITH GRAMS N/A 10/6/2022    Procedure: Robotic cholecystectomy with intraoperative cholangiograms;  Surgeon: Zaynab An MD;  Location:  OR     ENT SURGERY      typanamastoidectomy     ENT SURGERY Left [id number]    10 T-tube placements     LASIK CUSTOMVUE BILATERAL Bilateral 2015    Procedure: LASIK CUSTOMVUE BILATERAL;  Surgeon: Maycol Bella MD;  Location:  [facility]     WAVESCAN SCREENING Bilateral 2015    Procedure: WAVESCAN SCREENING;  Surgeon: Maycol Bella MD;  Location: [facility]     OB History    Para Term  AB Living   2 2 2 0 0 2   SAB IAB Ectopic Multiple Live Births   0 0 0 0 2      # Outcome Date GA Lbr Benedict/2nd Weight Sex Delivery Anes PTL Lv  "  2 Term 10/04/22 38w5d 02:15 / 00:27 3.657 kg (8 lb 1 oz) M Vag-Spont EPI  JUJU      Name: Manan      Apgar1: 8  Apgar5: 9   1 Term 10/03/16 40w5d 07:00 / 03:17 3.55 kg (7 lb 13.2 oz) M Vag-Spont EPI N JUJU      Name: Kael      Apgar1: 8  Apgar5: 9       Review of Systems  CONSTITUTIONAL: NEGATIVE for fever, chills, change in weight  INTEGUMENTARU/SKIN: NEGATIVE for worrisome rashes, moles or lesions  EYES: NEGATIVE for vision changes or irritation  ENT: NEGATIVE for ear, mouth and throat problems  RESP: NEGATIVE for significant cough or SOB  BREAST: NEGATIVE for masses, tenderness or discharge  CV: NEGATIVE for chest pain, palpitations or peripheral edema  GI: NEGATIVE for nausea, abdominal pain, heartburn, or change in bowel habits  : NEGATIVE for unusual urinary or vaginal symptoms. Periods are regular.  MUSCULOSKELETAL: NEGATIVE for significant arthralgias or myalgia  NEURO: NEGATIVE for weakness, dizziness or paresthesias  PSYCHIATRIC: NEGATIVE for changes in mood or affect     OBJECTIVE:   /88   Pulse 73   Temp 97.6  F (36.4  C) (Temporal)   Resp 15   Ht 1.676 m (5' 6\")   Wt 103 kg (227 lb)   SpO2 99%   Breastfeeding No   BMI 36.64 kg/m    Physical Exam  GENERAL: healthy, alert and no distress  EYES: Eyes grossly normal to inspection, PERRL and conjunctivae and sclerae normal  HENT: ear canals and TM's + Left ear Cerumen , nose and mouth without ulcers or lesions  NECK: no adenopathy, no asymmetry, masses, or scars and thyroid normal to palpation  RESP: lungs clear to auscultation - no rales, rhonchi or wheezes  BREAST: normal without masses, tenderness or nipple discharge and no palpable axillary masses or adenopathy  CV: regular rate and rhythm, normal S1 S2, no S3 or S4, no murmur, click or rub, no peripheral edema and peripheral pulses strong  ABDOMEN: soft, nontender, no hepatosplenomegaly, no masses and bowel sounds normal  MS: no gross musculoskeletal defects noted, no edema  SKIN: no " suspicious lesions or rashes  NEURO: Normal strength and tone, mentation intact and speech normal  PSYCH: mentation appears normal, affect normal/bright    Diagnostic Test Results:  Labs reviewed in Epic    ASSESSMENT/PLAN:         Assessment and Plan  1. Routine general medical examination at a health care facility  Pt is new to me , last seen in our group in September 2022 for COVID virus virtual visit.  Patient has been seen by specialists at  including gynecology, surgery.  - Does have concerns of hair loss , Overweight >. weight gain . Elevated LFTs which was before her cholecystectomy done recently in October 2022.  -Does endorses Hx of Left ear infections in the past and has Tubes placed x15 as she states . WIll need to get the records of ENT not available.   - Pt states that she follows [facility], [place] who manages her medications.   - REVIEW OF HEALTH MAINTENANCE PROTOCOL ORDERS  - Adult Comprehensive Weight Management  Referral; Future  - Comprehensive metabolic panel (BMP + Alb, Alk Phos, ALT, AST, Total. Bili, TP); Future  - Hemoglobin A1c; Future  - TSH with free T4 reflex; Future  - Comprehensive metabolic panel (BMP + Alb, Alk Phos, ALT, AST, Total. Bili, TP)  - Hemoglobin A1c  - TSH with free T4 reflex    2. Major depressive disorder, recurrent episode, mild (H)  3. Anxiety  Chronic problem, well-controlled with current medications as managed by psychiatry - Pt states that she follows [facility], [place] who manages her medications.  Continue current Lamictal, Lexapro, Seroquel, propranolol as managed by them.  - TSH with free T4 reflex; Future  - TSH with free T4 reflex    4. Weight gain  - TSH with free T4 reflex; Future  - TSH with free T4 reflex    5. BMI 36.0-36.9,adult  Ongoing problem, patient is motivated for weight loss program and medical weight management with GLP-1 agonist.  Opting for weight management referral as placed below.  - Adult Comprehensive Weight Management  " Referral; Future    6. Elevated LFTs  7. S/P cholecystectomy  - Comprehensive metabolic panel (BMP + Alb, Alk Phos, ALT, AST, Total. Bili, TP); Future  - Comprehensive metabolic panel (BMP + Alb, Alk Phos, ALT, AST, Total. Bili, TP)    8. FH: diabetes mellitus  9. History of gestational diabetes  - Hemoglobin A1c; Future  - Hemoglobin A1c    UPDATE -   Pt requesting to start on medication for weight loss.  5. BMI 36.0-36.9,adult  - Adult Comprehensive Weight Management  Referral; Future  - naltrexone-bupropion (CONTRAVE) 8-90 MG per 12 hr tablet; Take 1 tablet by mouth 2 times daily  Dispense: 60 tablet; Refill: 1       Patient Instructions   As discussed , please do the labs - will plan lipid panel on another day when fasting.     Will take care of migraine medication when needed.     Placed referral to Weight management as discussed.     ===============================================    Dietary Approaches to Stop Hypertension trial -- A different approach was evaluated in the Dietary Approaches to Stop Hypertension (DASH) trial [6]. Rather than evaluating sodium intake or weight loss, DASH randomly assigned 459 patients with BPs of less than 160/80 to 95 mmHg to one of three diets:  ?A control diet low in fruits, vegetables, and legumes and high in snacks, sweets, meats, and saturated fat.  ?A diet rich in fruits, vegetables, legumes and low in snacks and sweets.  ?A combination diet rich in fruits, vegetables, legumes, and low-fat dairy products and low in snacks, sweets, meats, and saturated and total fat (this combination diet is called the \"DASH diet\"). The DASH diet is comprised of four to five servings of fruit, four to five servings of vegetables, two to three servings of low-fat dairy per day, and <25 percent fat.  The following observations were noted in which the BP reductions were expressed in relation to the fall in BP seen with the control diet:  ?The fruits and vegetables diet " reduced the BP by 2.8/1.1 mmHg, and the combination diet reduced the BP by 5.5/3.0.  ?These effects were more pronounced in patients with hypertension. With the combination diet, for example, the BP fell 11.4/5.5 mmHg in hypertensives versus 3.5/2.1 mmHg in the normotensives.  ?The antihypertensive effects were maximal by the end of week 2 with any of the diets and were then maintained for eight weeks.         Preventive Health Recommendations  Female Ages 26 - 39  Yearly exam:   See your health care provider every year in order to    Review health changes.     Discuss preventive care.      Review your medicines if you your doctor has prescribed any.    Until age 30: Get a Pap test every three years (more often if you have had an abnormal result).    After age 30: Talk to your doctor about whether you should have a Pap test every 3 years or have a Pap test with HPV screening every 5 years.   You do not need a Pap test if your uterus was removed (hysterectomy) and you have not had cancer.  You should be tested each year for STDs (sexually transmitted diseases), if you're at risk.   Talk to your provider about how often to have your cholesterol checked.  If you are at risk for diabetes, you should have a diabetes test (fasting glucose).  Shots: Get a flu shot each year. Get a tetanus shot every 10 years.   Nutrition:     Eat at least 5 servings of fruits and vegetables each day.    Eat whole-grain bread, whole-wheat pasta and brown rice instead of white grains and rice.    Get adequate Calcium and Vitamin D.     Lifestyle    Exercise at least 150 minutes a week (30 minutes a day, 5 days of the week). This will help you control your weight and prevent disease.    Limit alcohol to one drink per day.    No smoking.     Wear sunscreen to prevent skin cancer.    See your dentist every six months for an exam and cleaning.      Return in about 6 months (around 10/21/2023), or if symptoms worsen or fail to improve, for Follow  "up of last visit, If symptoms persist.    MD HATTIE Coto [facility] MAURIZIO SANTA      Patient has been advised of split billing requirements and indicates understanding: Yes      COUNSELING:  Reviewed preventive health counseling, as reflected in patient instructions  Special attention given to:        Regular exercise       Healthy diet/nutrition       Vision screening       Hearing screening       Osteoporosis prevention/bone health      BMI:   Estimated body mass index is 36.64 kg/m  as calculated from the following:    Height as of this encounter: 1.676 m (5' 6\").    Weight as of this encounter: 103 kg (227 lb).   Weight management plan: Discussed healthy diet and exercise guidelines      She reports that she has never smoked. She has never used smokeless tobacco.      MD HATTIE Coto [facility] MAURIZIO PRAIRIE  "

## 2023-04-21 NOTE — PATIENT INSTRUCTIONS
"As discussed , please do the labs - will plan lipid panel on another day when fasting.     Will take care of migraine medication when needed.     Placed referral to Weight management as discussed.     ===============================================    Dietary Approaches to Stop Hypertension trial -- A different approach was evaluated in the Dietary Approaches to Stop Hypertension (DASH) trial [6]. Rather than evaluating sodium intake or weight loss, DASH randomly assigned 459 patients with BPs of less than 160/80 to 95 mmHg to one of three diets:  ?A control diet low in fruits, vegetables, and legumes and high in snacks, sweets, meats, and saturated fat.  ?A diet rich in fruits, vegetables, legumes and low in snacks and sweets.  ?A combination diet rich in fruits, vegetables, legumes, and low-fat dairy products and low in snacks, sweets, meats, and saturated and total fat (this combination diet is called the \"DASH diet\"). The DASH diet is comprised of four to five servings of fruit, four to five servings of vegetables, two to three servings of low-fat dairy per day, and <25 percent fat.  The following observations were noted in which the BP reductions were expressed in relation to the fall in BP seen with the control diet:  ?The fruits and vegetables diet reduced the BP by 2.8/1.1 mmHg, and the combination diet reduced the BP by 5.5/3.0.  ?These effects were more pronounced in patients with hypertension. With the combination diet, for example, the BP fell 11.4/5.5 mmHg in hypertensives versus 3.5/2.1 mmHg in the normotensives.  ?The antihypertensive effects were maximal by the end of week 2 with any of the diets and were then maintained for eight weeks.         Preventive Health Recommendations  Female Ages 26 - 39  Yearly exam:   See your health care provider every year in order to  Review health changes.   Discuss preventive care.    Review your medicines if you your doctor has prescribed any.    Until age 30: Get " a Pap test every three years (more often if you have had an abnormal result).    After age 30: Talk to your doctor about whether you should have a Pap test every 3 years or have a Pap test with HPV screening every 5 years.   You do not need a Pap test if your uterus was removed (hysterectomy) and you have not had cancer.  You should be tested each year for STDs (sexually transmitted diseases), if you're at risk.   Talk to your provider about how often to have your cholesterol checked.  If you are at risk for diabetes, you should have a diabetes test (fasting glucose).  Shots: Get a flu shot each year. Get a tetanus shot every 10 years.   Nutrition:   Eat at least 5 servings of fruits and vegetables each day.  Eat whole-grain bread, whole-wheat pasta and brown rice instead of white grains and rice.  Get adequate Calcium and Vitamin D.     Lifestyle  Exercise at least 150 minutes a week (30 minutes a day, 5 days of the week). This will help you control your weight and prevent disease.  Limit alcohol to one drink per day.  No smoking.   Wear sunscreen to prevent skin cancer.  See your dentist every six months for an exam and cleaning.

## 2023-04-25 ENCOUNTER — MYC MEDICAL ADVICE (OUTPATIENT)
Dept: FAMILY MEDICINE | Facility: CLINIC | Age: 38
End: 2023-04-25
Payer: COMMERCIAL

## 2023-07-31 ENCOUNTER — TRANSFERRED RECORDS (OUTPATIENT)
Dept: HEALTH INFORMATION MANAGEMENT | Facility: CLINIC | Age: 38
End: 2023-07-31
Payer: COMMERCIAL

## 2023-08-07 ASSESSMENT — SLEEP AND FATIGUE QUESTIONNAIRES
HOW LIKELY ARE YOU TO NOD OFF OR FALL ASLEEP WHILE SITTING AND READING: HIGH CHANCE OF DOZING
HOW LIKELY ARE YOU TO NOD OFF OR FALL ASLEEP WHILE LYING DOWN TO REST IN THE AFTERNOON WHEN CIRCUMSTANCES PERMIT: HIGH CHANCE OF DOZING
HOW LIKELY ARE YOU TO NOD OFF OR FALL ASLEEP WHILE WATCHING TV: MODERATE CHANCE OF DOZING
HOW LIKELY ARE YOU TO NOD OFF OR FALL ASLEEP WHILE SITTING QUIETLY AFTER LUNCH WITHOUT ALCOHOL: MODERATE CHANCE OF DOZING
HOW LIKELY ARE YOU TO NOD OFF OR FALL ASLEEP WHILE SITTING INACTIVE IN A PUBLIC PLACE: WOULD NEVER DOZE
HOW LIKELY ARE YOU TO NOD OFF OR FALL ASLEEP WHEN YOU ARE A PASSENGER IN A CAR FOR AN HOUR WITHOUT A BREAK: HIGH CHANCE OF DOZING
HOW LIKELY ARE YOU TO NOD OFF OR FALL ASLEEP WHILE SITTING AND TALKING TO SOMEONE: WOULD NEVER DOZE
HOW LIKELY ARE YOU TO NOD OFF OR FALL ASLEEP IN A CAR, WHILE STOPPED FOR A FEW MINUTES IN TRAFFIC: SLIGHT CHANCE OF DOZING

## 2023-08-13 NOTE — PROGRESS NOTES
MEDICAL WEIGHT LOSS INITIAL EVALUATION  DIAGNOSIS:  Class II Obesity    NUTRITION HISTORY:    Do you typically eat breakfast? Yes   If you do eat breakfast, what types of food do you eat? Waffle or white or wheat bagel with cream cheese Premier protein shake @ 7-8 or may graze on kids food     Do you typically eat lunch? Yes   If you do eat lunch, what types of food do you typically eat? Chicken Quesadilla or sandwich, fruit @ 11:30-1:00   Do you typically eat supper? Yes   If you do eat supper, what types of food do you typically eat? Meal Kit-Protein (grilled meat), vegetables, white rice @ 6-7    Do you typically eat snacks? Yes   If you do snack, what types of food do you typically eat? Chips or crackers or granola bar or cookies-3-5X per day   Do you like vegetables? Yes   Do you drink water? Yes-40 oz    How many glasses of juice do you drink in a typical day? 0   How many of glasses of milk do you drink in a typical day? 0   How many 8oz glasses of sugar containing drinks such as Amilcar-Aid/sweet tea do you drink in a day? 0   How many cans/bottles of sugar pop/soda/tea/sports drinks do you drink in a day? 0   How many cans/bottles of diet pop/soda/tea or sports drink do you drink in a day? 0   How often do you have a drink of alcohol? Daily   If you do drink, how many drinks might you have in a day? 1 or 2   Beverage choices: black coffee, protein drink  Dining out: 1X per week- pizza or out  Binge eating: weekly-more of a grazing pattern of mindless eating  Emotional eating: daily  Night time eating: never  Exercise: walks dog 2X per week for 15-20 minutes  Additional Information: Has family hx of diabetes and obesity. Wants to be proactive bout preventing health issues. Pateint shares grocery shopping and cooking with spouse. They have 2 young (6 years and 10 months) kids. Does not eat fish. Has tried meal kits and did not care for them. Often grazes on kids food. Work from home 95% of time.     MEDICATION  "FOR WEIGHT LOSS:  Plans to start Topamax and Phentermine    ANTHROPOMETRICS:  Height: 66\"   Weight: 228 lb    BMI:  36.8 kg/m2  NUTRITION DIAGNOSIS:   Overweight/Obese class II related to overeating and poor lifestyle habits as evidence by patient's subjective statements and  BMI of 36.8 kg/m2   NUTRITION INTERVENTIONS  Nutrition Prescription:  Recommend modified energy- nutrient intake  Implementation:  Nutrition Education (Content):  Discussed plate guidelines   Determined alternative to emotional eating  Reviewed healthy snacking and beverage choices  Provided: Tips for Weight Loss and Weight Management, Building a Healthy Relationship with Food, Protein Source for Weight Loss    Nutrition Education (Application):   Patient to practice goals as stated below  Patient verbalizes understanding of diet by wanting to avoid mindless eating patterns  Anticipate fair-good compliance    Goals:  Focus on alternatives to emotional eating (spend time with kids, hobbies, call a friend, etc)  Consistently have protein at breakfast  Aim for 48-64 oz water per day      FOLLOW UP AND MONITORING:    Other  - follow up in 4 weeks.     TIME SPENT WITH PATIENT:   25 minutes   Jose Eddy RD, LD  [facility]   "

## 2023-08-14 ENCOUNTER — OFFICE VISIT (OUTPATIENT)
Dept: SURGERY | Facility: CLINIC | Age: 38
End: 2023-08-14
Payer: COMMERCIAL

## 2023-08-14 ENCOUNTER — ALLIED HEALTH/NURSE VISIT (OUTPATIENT)
Dept: SURGERY | Facility: CLINIC | Age: 38
End: 2023-08-14
Payer: COMMERCIAL

## 2023-08-14 VITALS
HEART RATE: 86 BPM | WEIGHT: 228 LBS | DIASTOLIC BLOOD PRESSURE: 86 MMHG | OXYGEN SATURATION: 97 % | BODY MASS INDEX: 36.64 KG/M2 | SYSTOLIC BLOOD PRESSURE: 130 MMHG | HEIGHT: 66 IN

## 2023-08-14 DIAGNOSIS — Z86.32 HISTORY OF GESTATIONAL DIABETES: ICD-10-CM

## 2023-08-14 DIAGNOSIS — E66.09 CLASS 2 OBESITY DUE TO EXCESS CALORIES WITHOUT SERIOUS COMORBIDITY WITH BODY MASS INDEX (BMI) OF 36.0 TO 36.9 IN ADULT: Primary | ICD-10-CM

## 2023-08-14 DIAGNOSIS — E66.812 CLASS 2 OBESITY DUE TO EXCESS CALORIES WITHOUT SERIOUS COMORBIDITY WITH BODY MASS INDEX (BMI) OF 36.0 TO 36.9 IN ADULT: Primary | ICD-10-CM

## 2023-08-14 PROCEDURE — 97802 MEDICAL NUTRITION INDIV IN: CPT

## 2023-08-14 PROCEDURE — 99215 OFFICE O/P EST HI 40 MIN: CPT | Performed by: PHYSICIAN ASSISTANT

## 2023-08-14 PROCEDURE — 99417 PROLNG OP E/M EACH 15 MIN: CPT | Performed by: PHYSICIAN ASSISTANT

## 2023-08-14 RX ORDER — PHENTERMINE HYDROCHLORIDE 15 MG/1
CAPSULE ORAL
Qty: 30 CAPSULE | Refills: 3 | Status: SHIPPED | OUTPATIENT
Start: 2023-08-14 | End: 2024-01-28

## 2023-08-14 RX ORDER — TOPIRAMATE 25 MG/1
TABLET, FILM COATED ORAL
Qty: 90 TABLET | Refills: 3 | Status: SHIPPED | OUTPATIENT
Start: 2023-08-14 | End: 2023-11-30

## 2023-08-14 NOTE — PATIENT INSTRUCTIONS
"To ensure quality you may receive a patient satisfaction survey. The greatest compliment you can give is \"Likely to Recommend.\"    Nice to talk with you today.  Thank you for your trust. Below is the plan discussed.-  VIVIAN Treviño      Plan:  Start Phentermine  Take 1 capsule in the am.    Please get blood pressure/pulse checked in 2 weeks.  Send result through [facility].   Can get BP[facility]Pulse checked at [facility] [email or web address], at a [facility] Primary care office (nurse visit) [phone], or with a home machine.     Start Topiramate (Remember to drink plenty of fluids daily)   Week 1:Take 1 tablet (25 mg) at bedtime  Week 2 Take 2 tablets (50 mg) at bedtime  Week 3:Take 3 tablets (75 mg) at bedtime Stay at 3 tabs until you are seen again.     Remember to use condoms add avoid intercourse during your fertile window as Topiramate can cause birth defects.     Goals:  Reduce alcohol by 50%.   Add in movement daily.     FOLLOW-UP:    Please call [phone] to schedule your next visit in 3 months    Can use [employer] of needed.   PHENTERMINE    We are starting Phentermine.  Our patients on Phentermine find that they:    >feel less hunger    >find it easier to push the plate away   >have an easier time eating less    For some of our patients, these feelings are very real and immediate. For other patients, the feelings are less obvious. They don't feel much of a change but find they've lost weight. Like all weight loss medications, Phentermine  works best when you help it work. This means:  1. Having less tempting high calorie (fattening) food around the house or office. (For people with strong cravings this is very important.)   2. Staying away from situations or people that may trigger your cravings .   3. Eating out only one time or less each week.  4. Eating your meals at a table with the TV or computer off.    Side-effects. Phentermine is generally well tolerated. The most common side " effects are dry mouth and constipation. Because it is a stimulant, pts can have feelings of racing pulse or rapid heart beat. Some people can get an elevated blood pressure. Because of this we ask you to get your blood pressure and pulse checked within 1-2 weeks of starting the medication.     TOPIRAMATE (TOPAMAX)    Topiramate (Topamax) is a medication that is used most often to treat migraine headaches or for seizures. It has also been found to help with weight loss. Although it's not currently FDA approved for weight loss, it has been used safely for a number of years to help people who are carrying extra weight.     Just how topiramate helps with weight loss has not been exactly determined. However it seems to work on areas of the brain to quiet down signals related to eating.      Topiramate may make you:    >feel less interest in eating in between meals   >think less about food and eating   >find it easier to push the plate away   >find giving up pop easier    >have an easier time eating less    Instructions:  Week 1:Take 1 tablet (25 mg) at bedtime  Week 2 Take 2 tablets (50 mg) at bedtime  Week 3:Take 3 tablets (75 mg) at bedtime Stay at 3 tabs until you are seen again.     For some of our patients, the pills work right away. They feel and think quite differently about food. Other patients don't feel much of a change but find in fact they have lost weight! Like all weight loss medications, topiramate works best when you help it work.  This means:   1) Have less tempting high calorie (fattening) food around the house or office    2) Have lower calorie food (fruits, vegetables,low fat meats and dairy) for snacks    3) Eat out only one time or less each week.   4) Eat your meals at a table with the TV or computer off.    Side-effects Topiramate is generally well tolerated. The main side-effects we see are:   Tingling in hands,feet, or face (usually not very troublesome)   Mental confusion and word finding  trouble (about 10% of patients have this.)     Feeling sleepy or a bit dopey- this goes away very soon after starting.    One of the dangers of topiramate is the possibility of birth defects--if you get pregnant when you are on it, there is the risk that your baby will be born with a cleft lip or palate.  If you are on topiramate and of child bearing age, you need to be on a reliable form of birth control or refrain from sexual intercourse.     For any questions or concerns please send a [employer] message to our team or call our weight management call center at [phone] during regular business hours. For questions during evenings or weekends your messages will be addressed during the next business day.  For emergencies please call 911 or seek immediate medical care.

## 2023-08-14 NOTE — PATIENT INSTRUCTIONS
Pablito Alex-  Welcome to the [facility], [place]! It was great to visit with you and learn about your interest in weight loss. Below are the goals we discussed.  Goals:  Focus on alternatives to emotional eating (spend time with kids, hobbies, call a friend, etc)  Consistently  have protein at breakfast  Aim for 48-64 oz water per day    We look forward to meeting with you in about 1 month.     Thanks!  Jose Edyd RD, LD  [facility], [place]

## 2023-08-14 NOTE — PROGRESS NOTES
"New Medical Weight Management Consult    PATIENT:  Abi Dawkins   MRN:         [id number]   :         1985  FRANK:         2023      Dear Kimberly Galeas MD,    I had the pleasure of seeing your patient, Abi Dawkins. Full intake/assessment was done to determine barriers to weight loss success and develop a treatment plan. Abi Dawkins is a 38 year old female interested in treatment of medical problems associated with excess weight. She has a height of 5' 6\", a weight of 228 lbs 0 oz, and the calculated Body mass index is 36.8 kg/m .    Assessment & Plan   Problem List Items Addressed This Visit     History of gestational diabetes     Current HgA1C 5.3.           Class 2 obesity due to excess calories without serious comorbidity with body mass index (BMI) of 36.0 to 36.9 in adult - Primary     2023 [facility] Initial wt 228 lb BMI 36 Phen/top, Diet           Relevant Medications    phentermine (ADIPEX-P) 15 MG capsule    topiramate (TOPAMAX) 25 MG tablet        PROGRAM OVERVIEW  Reviewed options at [facility].   All questions were answered. Education provided on chronic disease management of obesity.     MEDICATIONS:  We discussed healthy habits to assist with weight loss. We reviewed medications associated with weight gain. We discussed the role of pharmacological agents in the treatment of obesity and the \"off-label\" use of medications in this practice. We reviewed medication that may assist with weight loss. Indications, contraindications, risks/benefits, and potential side effects were discussed.   Phentermine and Topiramate were prescribed. Pt aware of birth defect risk when on Topiramate and wants to proceed.  Verbalized that she will use condoms and avoiding intercourse during her fertile period.  Discussed that medications must always be used together with lifestyle changes such as improvements in diet choices, portion control and establishing and maintaining a regular " "exercise program. Reviewed rationale for long term use of pharmacotherapy in chronic disease management for obesity.      AOM Considerations:  Phentermine:  Candidate, low energy and low motivation currently  Topiramate:  Candidate, drinks 2 beers a night- carbonation, snacker, Using Condoms only for BC.    GLP-1:  Wegovy covered but not available    Naltrexone:  Tried Contrave and did not feel effective  Wellbutrin:  Tried Contrave and did not feel effective  Metformin:  No DM, Pre DM  Contrave: Tried and did not feel effective  Qsymia: Not covered           PATIENT INSTRUCTIONS:  Start Phentermine  Take 1 capsule in the am.    Please get blood pressure/pulse checked in 2 weeks.  Send result through [facility].     Start Topiramate (Remember to drink plenty of fluids daily)     Remember to use condoms add avoid intercourse during your fertile window as Topiramate can cause birth defects.     Goals:  Reduce alcohol by 50%.   Add in movement daily.     FOLLOW-UP:    Please call [phone] to schedule your next visit in 3 months    69 minutes spent on the date of the encounter doing chart review, history and exam, review test results, counseling, developing plan of care, documentation, and further activities as noted above.      She has the following co-morbidities:        8/7/2023    10:53 AM   --   I have the following health issues associated with obesity None   I have the following symptoms associated with obesity Knee Pain    Depression    Back Pain    Fatigue           8/7/2023    10:53 AM   Referring Provider   Please name the provider who referred you to Medical Weight Management  If you do not know, please answer \"I Don't Know\" Dr Galeas           8/7/2023    10:53 AM   Weight History   How concerned are you about your weight? Very Concerned   I became overweight As an Adult   The following factors have contributed to my weight gain Mental Health Issues    Change in Schedule    Eating Wrong Types of Food    " Eating Too Much    Lack of Exercise    Genetic (Runs in the Family)    Stress    Other   Please list the other factors Pandemic   I have tried the following methods to lose weight Watching Portions or Calories    Exercise    Atkins-type Diet (Low Carb/High Protein)    Nutrisystem    Medications   My lowest weight since age 20 was 150   My highest weight since age 20 was 137   The most weight I have ever lost was (lbs) 25   I have the following family history of obesity/being overweight My father is overweight    One or more of my siblings are overweight   How has your weight changed over the last year? Gained   How many pounds? 10   Had been on Contrave for a month.  Did not see a change on the medication.   took naltrexone and less a motivation to eat/snack.     Had no had a PCP for a long time.  PCP recommended our program and was recommending medication for weight loss. Biggest concerns are obesity and fam.  hx of diabetes.  Has hx of GDM.  When younger was very active.  Likes going to gym and eating healthy.  With kids not she is more sedentary.  Over pandemic she gained 40 lbs- increased stress, eating more, drinking more.   Got a walking pad for under her desk at work during pandemic, is active with oldest son daily.      After first pregnancy she did Ideal protein diet to lose weight.  Also had tried NS, can lose weight but it returns.      Is a snacker.  Likes carbs.  Eats these to feel full and full again.  During recently pregnancy she only gained 5 lbs and was not that hungry.  Feels a real blah relationship with food.  Is not excited about food right now.    See's a psychiatrist and on medication for depression.  Depression manifested as anger outputs.  Has panic attacks once a month.  If working through a hard project at work she will be snacking.  When she decompresses she will snack after kids go to bed.      Wants to see the results to get move motivated.  Would like to get a breast reduction.           8/7/2023    10:53 AM   Diet Recall Review with Patient   If you do eat breakfast, what types of food do you eat? Wake up 6:30 am  Graze between 7-8 am Waffle, bagel, protein shake once in a while.     If you do eat lunch, what types of food do you typically eat? 11:30-1 pm Quesadilla, sandwich, fruit   If you do eat supper, what types of food do you typically eat? 6-7 PM Protein, vegetables   If you do snack, what types of food do you typically eat? Chips, crackers, granola bar, cookies  Snacks between meals, (carb that is easy to grab)    8:30-10:00 Grazing Cookies, popcorn, goldfish, cheese its.     How many glasses of juice do you drink in a typical day? 0   How many of glasses of milk do you drink in a typical day? 0   How many 8oz glasses of sugar containing drinks such as Amilcar-Aid/sweet tea do you drink in a day? 0   How many cans/bottles of sugar pop/soda/tea/sports drinks do you drink in a day? 0   How many cans/bottles of diet pop/soda/tea or sports drink do you drink in a day? 0   How often do you have a drink of alcohol? Daily Beer    If you do drink, how many drinks might you have in a day? 1 or 2           8/7/2023    10:53 AM   Eating Habits   Generally, my meals include foods like these bread, pasta, rice, potatoes, corn, crackers, sweet dessert, pop, or juice Almost Everyday   Generally, my meals include foods like these fried meats, brats, burgers, french fries, pizza, cheese, chips, or ice cream Almost Everyday   Eat fast food (like McDonalds, Burger Ilia, Taco Bell) Less Than Weekly   Eat at a buffet or sit-down restaurant Once a Week   Eat most of my meals in front of the TV or computer A Few Times a Week   Often skip meals, eat at random times, have no regular eating times Once a Week   Rarely sit down for a meal but snack or graze throughout Once a Week   Eat extra snacks between meals Everyday   Eat most of my food at the end of the day A Few Times a Week   Eat in the middle of the  night or wake up at night to eat Never   Eat extra snacks to prevent or correct low blood sugar Never   Eat to prevent acid reflux or stomach pain Never   Worry about not having enough food to eat Never   I eat when I am depressed A Few Times a Week   I eat when I am stressed Everyday   I eat when I am bored Almost Everyday   I eat when I am anxious Almost Everyday   I eat when I am happy or as a reward A Few Times a Week   I feel hungry all the time even if I just have eaten A Few Times a Week   Feeling full is important to me Almost Everyday   I finish all the food on my plate even if I am already full Less Than Weekly   I can't resist eating delicious food or walk past the good food/smell Everyday   I eat/snack without noticing that I am eating Almost Everyday   I eat when I am preparing the meal Less Than Weekly   I eat more than usual when I see others eating Almost Everyday   I have trouble not eating sweets, ice cream, cookies, or chips if they are around the house Almost Everyday   I think about food all day A Few Times a Week   What foods, if any, do you crave? Sweets/Candy/Chocolate   Please list any other foods you crave? I crave so  sweet AND salty     Never goes for seconds, uses normal size plates.  Not a volume eater.        8/7/2023    10:53 AM   Amount of Food   I feel out of control when eating Weekly   I eat a large amount of food, like a loaf of bread, a box of cookies, a pint/quart of ice cream, all at once Weekly   I eat a large amount of food even when I am not hungry Monthly   I eat rapidly Weekly   I eat alone because I feel embarrassed and do not want others to see how much I have eaten Monthly   I eat until I am uncomfortably full Monthly   I feel bad, disgusted, or guilty after I overeat Almost Everyday           8/7/2023    10:53 AM   Activity/Exercise History   How much of a typical 12 hour day do you spend sitting? Half the Day   How much of a typical 12 hour day do you spend lying  down? Less Than Half the Day   How much of a typical day do you spend walking/standing? Less Than Half the Day   How many hours (not including work) do you spend on the TV/Video Games/Computer/Tablet/Phone? 1 Hour or Less   How many times a week are you active for the purpose of exercise? Once a Week   What keeps you from being more active? Shortness of Breath    Lack of Time    Too tired   How many total minutes do you spend doing some activity for the purpose of exercising when you exercise? 15-30 Minutes   Has no energy at night for exercise.     PAST MEDICAL HISTORY:  Past Medical History:   Diagnosis Date     Anxiety      Cholesteatoma 07/19/2012     COVID-19 09/19/2022    Sx's started Friday fatigue, chills, headache, runny nose. Saturday started coughing, weird episode of shakes-intense shivering. Yesterday loss sense of taste. No fevers. No known exposure     Hypertension     GHTN with second pregnancy     Major depressive disorder, recurrent episode, mild (H)      Migraines            8/7/2023    10:53 AM   Work/Social History Reviewed With Patient   My employment status is Full-Time 8 am- 4:40-5:00 pm.     My job is Product management   How much of your job is spent on the computer or phone? 100%   How many hours do you spend commuting to work daily? 10 min / 1 week   What is your marital status? /In a Relationship   If in a relationship, is your significant other overweight? Yes   If you have children, are they overweight? No   Who do you live with? Spouse, 2 children 10 mo and 6.5 yr boy   Who does the food shopping? Me / spouse        Social History     Tobacco Use     Smoking status: Never     Smokeless tobacco: Never   Vaping Use     Vaping Use: Never used   Substance Use Topics     Alcohol use: Not Currently     Comment: one drink     Drug use: No            8/7/2023    10:53 AM   Mental Health History Reviewed With Patient   Have you ever been physically or sexually abused? No   How often in  the past 2 weeks have you felt little interest or pleasure in doing things? For Several Days   Over the past 2 weeks how often have you felt down, depressed, or hopeless? For Several Days           8/7/2023    10:53 AM   Questions Reviewed With Patient   How ready are you to make changes regarding your weight? Number 1 = Not ready at all to make changes up to 10 = very ready. 7   How confident are you that you can change? 1 = Not confident that you will be successful making changes up to 10 = very confident. 6           8/7/2023    10:53 AM   Sleep History Reviewed With Patient   How many hours do you sleep at night? 8       MEDICATIONS:   Current Outpatient Medications   Medication Sig Dispense Refill     escitalopram (LEXAPRO) 20 MG tablet Take 1 tablet (20 mg) by mouth daily 90 tablet 3     lamoTRIgine (LAMICTAL) 25 MG chewable tablet Take 50 mg by mouth 2 times daily       naltrexone-bupropion (CONTRAVE) 8-90 MG per 12 hr tablet Take 1 tablet by mouth 2 times daily 60 tablet 1     phentermine (ADIPEX-P) 15 MG capsule Take 1 capsule in the morning. 30 capsule 3     propranolol (INDERAL) 20 MG tablet TAKE 1 TABLET BY MOUTH TWICE DAILY DAILY FOR ANXIETY       QUEtiapine (SEROQUEL) 25 MG tablet Take 25 mg by mouth At Bedtime       rizatriptan (MAXALT-MLT) 5 MG ODT tab Take 1 tablet (5 mg) by mouth at onset of headache for migraine 9 tablet 3     topiramate (TOPAMAX) 25 MG tablet Take 25 mg daily week 1, increase to 50 mg daily week 2, then increase to 75 mg daily week 3 and beyond 90 tablet 3     lamoTRIgine (LAMICTAL) 25 MG tablet Take 2 tablets by mouth 2 times daily         ALLERGIES:   Allergies   Allergen Reactions     Cats      Runny nose, redness under eyes, sniffling       ROS:    HEENT  H/O glaucoma:  no  Cardiovascular  CAD:   no  Palpitations:   No, only during panic attack    HTN:    no  Gastrointestinal  GERD:   no  Constipation:   no  Liver Dz:   no  H/O Pancreatitis:  no  H/O Gallbladder  Dz: removed  Psychiatric  Moods Stable:  yes  Anxiety:   Yes, 1-10 (4-5)  Depression:  yes  Bipolar:  no  H/O ETOH/Drug Use: no  H/O eating disorder: no  Endocrine  PMH/FMH of MTC or MEN2:  no  Neurologic:  H/O seizures:   no  Headaches:  Yes weekly regular headaches, 1-2 a quarter migraines.    Memory Impairment:  no    H/O kidney stones:  no  Kidney disease:  no  Current birth control:  Condoms    LABS/RECORDS REVIEWED:  Hemoglobin A1C   Date Value Ref Range Status   04/21/2023 5.3 0.0 - 5.6 % Final     Comment:     Normal <5.7%   Prediabetes 5.7-6.4%    Diabetes 6.5% or higher     Note: Adopted from ADA consensus guidelines.   09/21/2018 5.0 0 - 5.6 % Final     Comment:     Normal <5.7% Prediabetes 5.7-6.4%  Diabetes 6.5% or higher - adopted from ADA   consensus guidelines.       TSH   Date Value Ref Range Status   04/21/2023 1.71 0.30 - 4.20 uIU/mL Final   12/06/2019 0.95 0.40 - 4.00 mU/L Final     Sodium   Date Value Ref Range Status   04/21/2023 138 136 - 145 mmol/L Final   09/21/2018 139 133 - 144 mmol/L Final     Potassium   Date Value Ref Range Status   04/21/2023 4.0 3.4 - 5.3 mmol/L Final   10/05/2022 3.9 3.4 - 5.3 mmol/L Final   09/21/2018 4.5 3.4 - 5.3 mmol/L Final     Chloride   Date Value Ref Range Status   04/21/2023 100 98 - 107 mmol/L Final   10/05/2022 111 (H) 94 - 109 mmol/L Final   09/21/2018 102 94 - 109 mmol/L Final     Carbon Dioxide   Date Value Ref Range Status   09/21/2018 31 20 - 32 mmol/L Final     Carbon Dioxide (CO2)   Date Value Ref Range Status   04/21/2023 24 22 - 29 mmol/L Final   10/05/2022 20 20 - 32 mmol/L Final     Anion Gap   Date Value Ref Range Status   04/21/2023 14 7 - 15 mmol/L Final   10/05/2022 9 3 - 14 mmol/L Final   09/21/2018 6 3 - 14 mmol/L Final     Glucose   Date Value Ref Range Status   04/21/2023 82 70 - 99 mg/dL Final   10/05/2022 82 70 - 99 mg/dL Final   09/21/2018 69 (L) 70 - 99 mg/dL Final     Urea Nitrogen   Date Value Ref Range Status   04/21/2023 15.0  6.0 - 20.0 mg/dL Final   10/05/2022 10 7 - 30 mg/dL Final   09/21/2018 19 7 - 30 mg/dL Final     Creatinine   Date Value Ref Range Status   04/21/2023 1.16 (H) 0.51 - 0.95 mg/dL Final   12/06/2019 0.94 0.52 - 1.04 mg/dL Final     GFR Estimate   Date Value Ref Range Status   04/21/2023 62 >60 mL/min/1.73m2 Final     Comment:     eGFR calculated using 2021 CKD-EPI equation.   12/06/2019 80 >60 mL/min/[1.73_m2] Final     Comment:     Non  GFR Calc  Starting 12/18/2018, serum creatinine based estimated GFR (eGFR) will be   calculated using the Chronic Kidney Disease Epidemiology Collaboration   (CKD-EPI) equation.       Calcium   Date Value Ref Range Status   04/21/2023 9.9 8.6 - 10.0 mg/dL Final   09/21/2018 9.3 8.5 - 10.1 mg/dL Final     Bilirubin Total   Date Value Ref Range Status   04/21/2023 0.5 <=1.2 mg/dL Final   09/21/2018 0.6 0.2 - 1.3 mg/dL Final     Alkaline Phosphatase   Date Value Ref Range Status   04/21/2023 86 35 - 104 U/L Final   09/21/2018 71 40 - 150 U/L Final     ALT   Date Value Ref Range Status   04/21/2023 15 10 - 35 U/L Final   09/21/2018 22 0 - 50 U/L Final     AST   Date Value Ref Range Status   04/21/2023 28 10 - 35 U/L Final   09/21/2018 23 0 - 45 U/L Final     Cholesterol   Date Value Ref Range Status   12/06/2019 220 (H) <200 mg/dL Final     Comment:     Desirable:       <200 mg/dl     HDL Cholesterol   Date Value Ref Range Status   12/06/2019 54 >49 mg/dL Final     LDL Cholesterol Calculated   Date Value Ref Range Status   12/06/2019 144 (H) <100 mg/dL Final     Comment:     Above desirable:  100-129 mg/dl  Borderline High:  130-159 mg/dL  High:             160-189 mg/dL  Very high:       >189 mg/dl       Triglycerides   Date Value Ref Range Status   12/06/2019 111 <150 mg/dL Final     WBC   Date Value Ref Range Status   12/06/2019 6.8 4.0 - 11.0 10e9/L Final     WBC Count   Date Value Ref Range Status   10/05/2022 8.9 4.0 - 11.0 10e3/uL Final     Hemoglobin   Date Value  "Ref Range Status   10/05/2022 12.0 11.7 - 15.7 g/dL Final   12/06/2019 12.8 11.7 - 15.7 g/dL Final     Hematocrit   Date Value Ref Range Status   10/05/2022 35.7 35.0 - 47.0 % Final   12/06/2019 38.2 35.0 - 47.0 % Final     MCV   Date Value Ref Range Status   10/05/2022 91 78 - 100 fL Final   12/06/2019 93 78 - 100 fl Final     Platelet Count   Date Value Ref Range Status   10/05/2022 239 150 - 450 10e3/uL Final   12/06/2019 304 150 - 450 10e9/L Final         BP Readings from Last 6 Encounters:   08/14/23 130/86   04/21/23 120/88   11/29/22 120/76   10/07/22 118/85   10/03/22 129/87   10/02/22 128/83       Pulse Readings from Last 6 Encounters:   08/14/23 86   04/21/23 73   10/07/22 58   10/03/22 84   12/06/19 70   03/14/19 80       PHYSICAL EXAM:  /86   Pulse 86   Ht 5' 6\" (1.676 m)   Wt 228 lb (103.4 kg)   SpO2 97%   BMI 36.80 kg/m    GENERAL: Healthy, alert and no distress  EYES: Eyes grossly normal to inspection.  No discharge or erythema, or obvious scleral/conjunctival abnormalities.  CV: Regular rate and rhythm without murmurs, rubs, or gallops.  RESP: Lungs are clear to auscultation bilaterally with good breath sounds. No audible wheeze, cough, or visible cyanosis.  No visible retractions or increased work of breathing.    SKIN: Visible skin clear. No significant rash, abnormal pigmentation or lesions.  NEURO: Cranial nerves grossly intact.  Mentation and speech appropriate for age.  PSYCH: Mentation appears normal, affect normal/bright, judgement and insight intact, normal speech and appearance well-groomed.    COUNSELING:   Reviewed obesity as a chronic disease and comprehensive management stratagies.      We discussed Bariatric Basics including:  -eating 3 meals daily  -eating protein first  -eating slowly, chewing food well  -avoiding/limiting calorie containing beverages  -limiting carbohydrates and changing to whole grains  -limiting restaurant or cafeteria eating to twice a week or " less    We discussed the importance of restorative sleep and stress management in maintaining a healthy weight.  We discussed insulin resistance and glycemic index as it relates to appetite and weight control.   We discussed the importance of physical activity including cardiovascular and strength training in maintaining a healthier weight and explored viable options.  Patient education of above written in AVS.      Sincerely,    Kamila Kan PA-C

## 2023-10-24 NOTE — RESULT ENCOUNTER NOTE
Please call and  inform of results
ovarian cyst for many years, imaging shows hydrosalpinx, stress incontinence occasional

## 2023-11-08 ENCOUNTER — VIRTUAL VISIT (OUTPATIENT)
Dept: FAMILY MEDICINE | Facility: CLINIC | Age: 38
End: 2023-11-08
Payer: COMMERCIAL

## 2023-11-08 DIAGNOSIS — R05.2 SUBACUTE COUGH: Primary | ICD-10-CM

## 2023-11-08 DIAGNOSIS — J01.90 ACUTE NON-RECURRENT SINUSITIS, UNSPECIFIED LOCATION: ICD-10-CM

## 2023-11-08 PROCEDURE — 99213 OFFICE O/P EST LOW 20 MIN: CPT | Mod: VID | Performed by: FAMILY MEDICINE

## 2023-11-08 RX ORDER — LAMOTRIGINE 100 MG/1
100 TABLET ORAL AT BEDTIME
COMMUNITY
Start: 2023-10-30

## 2023-11-08 RX ORDER — BENZONATATE 100 MG/1
100 CAPSULE ORAL 3 TIMES DAILY PRN
Qty: 30 CAPSULE | Refills: 0 | Status: SHIPPED | OUTPATIENT
Start: 2023-11-08 | End: 2023-11-30

## 2023-11-08 RX ORDER — SULFAMETHOXAZOLE/TRIMETHOPRIM 800-160 MG
1 TABLET ORAL 2 TIMES DAILY
Qty: 20 TABLET | Refills: 0 | Status: SHIPPED | OUTPATIENT
Start: 2023-11-08 | End: 2023-11-18

## 2023-11-08 ASSESSMENT — PATIENT HEALTH QUESTIONNAIRE - PHQ9
SUM OF ALL RESPONSES TO PHQ QUESTIONS 1-9: 3
SUM OF ALL RESPONSES TO PHQ QUESTIONS 1-9: 3
10. IF YOU CHECKED OFF ANY PROBLEMS, HOW DIFFICULT HAVE THESE PROBLEMS MADE IT FOR YOU TO DO YOUR WORK, TAKE CARE OF THINGS AT HOME, OR GET ALONG WITH OTHER PEOPLE: SOMEWHAT DIFFICULT

## 2023-11-08 NOTE — PROGRESS NOTES
"Abi is a 38 year old who is being evaluated via a billable video visit.      How would you like to obtain your AVS? MyChart  If the video visit is dropped, the invitation should be resent by: Text to cell phone: [phone]  Will anyone else be joining your video visit? No          Assessment & Plan     Subacute cough  Likely illness started >1 month ago with viral illness that has resulted in irritation of the tissues in the back of the throat contributing to/causing cough.  Possible sinus component due to prolonged duration and postnasal drainage.    - benzonatate (TESSALON) 100 MG capsule; Take 1 capsule (100 mg) by mouth 3 times daily as needed for cough    Acute non-recurrent sinusitis, unspecified location  If symptoms not resolved with the use of the tessalon, begin antibiotic in 3-4 days.   Symptoms not resolved with PCN so change to alternative - sulfa treatment.    - sulfamethoxazole-trimethoprim (BACTRIM DS) 800-160 MG tablet; Take 1 tablet by mouth 2 times daily for 10 days    Prescription drug management         BMI:   Estimated body mass index is 36.8 kg/m  as calculated from the following:    Height as of 8/14/23: 1.676 m (5' 6\").    Weight as of 8/14/23: 103.4 kg (228 lb).   Weight management plan: Patient was referred to their PCP to discuss a diet and exercise plan.  On treatment with bariatric clinic    Patient Instructions   Begin tessalon perles for cough symptoms.      NON PRESCRIPTION TREATMENT    Mucinex 600 mg twice a day   Increase humidity to 30-40% in bedroom at night - vaporizer  Avoid decongestant (sudafed)  Saline nasal spray as needed  Increase fluid intake  Benadryl 25mg 1/2 - 1 hour before bed time  Maintain 8 hr minimum of sleep at night  Robitussin DM cough gels for cough    IF symptoms persist or worsen, begin Bactrim twice a day for 10 days to cover other bacterial that the Penicillin may have missed in sinus area.        Bianca Odell MD  [facility] " ABRAHAM Alex is a 38 year old, presenting for the following health issues:  Pharyngitis        11/8/2023     8:31 AM   Additional Questions   Roomed by Benjy       History of Present Illness       Reason for visit:  Strep positve 10/27 and not better  Symptom onset:  1-2 weeks ago  Symptoms include:  Severe cough, sore throat, fatigue, congestion, runny nose, trouble sleeping, trouble talking  Symptom intensity:  Moderate  Symptom progression:  Staying the same  Had these symptoms before:  Yes  Has tried/received treatment for these symptoms:  Yes  Previous treatment was successful:  No  Prior treatment description:  Penicillin  What makes it worse:  None  What makes it better:  Drinking water, cough syrup    She eats 0-1 servings of fruits and vegetables daily.She consumes 0 sweetened beverage(s) daily.She exercises with enough effort to increase her heart rate 9 or less minutes per day.  She exercises with enough effort to increase her heart rate 3 or less days per week. She is missing 2 dose(s) of medications per week.  She is not taking prescribed medications regularly due to remembering to take.     patient with symptoms over the last 6 weeks - all of October.   Congestion, drainage, cough.  GI symptoms on 17th - resolved.  Finished PCN - voice better.  Son with strep -  so she went in and was tested.  Her testing was positive so treated with PCN.     Throat irritation causing cough.  Nasal postnasal drainage.     Taking cough syrup regularly.  Drinking liquids to get through the day.    Cough worse at night.    Deep cough with occasional production.  More dry.      Moments of catching breath that results in cough and physical activity results in cough.                Review of Systems   Constitutional, HEENT, cardiovascular, pulmonary, gi and gu systems are negative, except as otherwise noted.      Objective           Vitals:  No vitals were obtained today due to virtual visit.    Physical Exam    GENERAL: Healthy, alert and no distress  EYES: Eyes grossly normal to inspection.  No discharge or erythema, or obvious scleral/conjunctival abnormalities.  RESP: No audible wheeze, cough, or visible cyanosis.  No visible retractions or increased work of breathing.    SKIN: Visible skin clear. No significant rash, abnormal pigmentation or lesions.  NEURO: Cranial nerves grossly intact.  Mentation and speech appropriate for age.  PSYCH: Mentation appears normal, affect normal/bright, judgement and insight intact, normal speech and appearance well-groomed.                Video-Visit Details    Type of service:  Video Visit     Originating Location (pt. Location): Home    Distant Location (provider location):  Off-site  Platform used for Video Visit: [employer]

## 2023-11-08 NOTE — PATIENT INSTRUCTIONS
Begin tessalon perles for cough symptoms.      NON PRESCRIPTION TREATMENT    Mucinex 600 mg twice a day   Increase humidity to 30-40% in bedroom at night - vaporizer  Avoid decongestant (sudafed)  Saline nasal spray as needed  Increase fluid intake  Benadryl 25mg 1/2 - 1 hour before bed time  Maintain 8 hr minimum of sleep at night  Robitussin DM cough gels for cough    IF symptoms persist or worsen, begin Bactrim twice a day for 10 days to cover other bacterial that the Penicillin may have missed in sinus area.
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
## 2023-11-30 ENCOUNTER — OFFICE VISIT (OUTPATIENT)
Dept: SURGERY | Facility: CLINIC | Age: 38
End: 2023-11-30
Payer: COMMERCIAL

## 2023-11-30 VITALS
DIASTOLIC BLOOD PRESSURE: 85 MMHG | HEART RATE: 73 BPM | WEIGHT: 213 LBS | BODY MASS INDEX: 34.23 KG/M2 | SYSTOLIC BLOOD PRESSURE: 125 MMHG | HEIGHT: 66 IN | OXYGEN SATURATION: 98 %

## 2023-11-30 DIAGNOSIS — E66.811 CLASS 1 OBESITY DUE TO EXCESS CALORIES WITHOUT SERIOUS COMORBIDITY WITH BODY MASS INDEX (BMI) OF 34.0 TO 34.9 IN ADULT: Primary | ICD-10-CM

## 2023-11-30 DIAGNOSIS — E66.09 CLASS 1 OBESITY DUE TO EXCESS CALORIES WITHOUT SERIOUS COMORBIDITY WITH BODY MASS INDEX (BMI) OF 34.0 TO 34.9 IN ADULT: Primary | ICD-10-CM

## 2023-11-30 PROCEDURE — 99215 OFFICE O/P EST HI 40 MIN: CPT | Performed by: PHYSICIAN ASSISTANT

## 2023-11-30 RX ORDER — TOPIRAMATE 25 MG/1
TABLET, FILM COATED ORAL
Qty: 270 TABLET | Refills: 1 | Status: SHIPPED | OUTPATIENT
Start: 2023-11-30 | End: 2024-03-07

## 2023-11-30 NOTE — PATIENT INSTRUCTIONS
"To ensure quality you may receive a patient satisfaction survey. The greatest compliment you can give is \"Likely to Recommend.\"    Nice to talk with you today.  Thank you for your trust. Below is the plan discussed.-  VIVIAN Treviño      Plan:  Continue Topiramate 75 mg daily  Stop phentermine for a week to see if you can tell a difference off the medication.  If no difference discontinue.  If you feel this is helping you can MyChart and I will refill.  Labs ordered.  Please call [phone] to set up a lab appt.   Follow-up with dietitian    Goals:  Talk to  about goal to reduce alcohol by 50%  Focus on hydration  Add in strength training 5 minutes a day    FOLLOW-UP:    Please call [phone] to schedule your next visit in 3 months.     Strength Training  Strength training is any type of exercise that involves your own body weight or equipment to build muscle mass, endurance, and strength. increasing the amount of muscle in your body aids in increasing your metabolism. Research shows that your metabolic rate is increased up to 72 hours after strength-training exercise.    Weight Management Strengthening Exercises   Http://www.fvfiles.[email or web address].pdf    Muscle Conditioning: Helping You Get and Stay Fit  https://www.Pay4later/365650.pdf    No Equipment Home Exercise Lists from [facility] [employer]   https://www.[employer].org/public/upload/files/general/EN21_JS_YW_HrHflb_Hfgkyugl_Phsztkpu.pdf    Strength training YouTube workouts  https://www.[employer].com/user/KozakSportsPerform    [facility] Fitness Basics: Strength Training How-to video collection  https://www.[facility].org/healthy-lifestyle/fitness/basics/fitness-basics/hlv-01197502    Beginning strength training exercise articles  https://www.[employer].com/2022/10/12/well/move/strength-training-beginners-guide.html  https://www.[employer].com/fitness/workouts/strength-training-beginners                      "

## 2023-11-30 NOTE — ASSESSMENT & PLAN NOTE
Patient was congratulated on wt loss success thus far. Healthy habits to assist with further weight loss were discussed. Abi will continue Topiramate 75 mg at night.  She will stop phentermine for a week and intentionally noticed if she can tell a difference off the medication.  If no difference she will discontinue.  If she feels this is helping she can MyChart and I will refill.  BMP ordered/blood pressure and pulse within normal limits. Goals noted in pt instructions.   Patient aware to use condoms add avoid intercourse during your fertile window as Topiramate can cause birth defects.
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
## 2023-12-08 ENCOUNTER — VIRTUAL VISIT (OUTPATIENT)
Dept: SURGERY | Facility: CLINIC | Age: 38
End: 2023-12-08
Payer: COMMERCIAL

## 2023-12-08 VITALS — BODY MASS INDEX: 34.23 KG/M2 | WEIGHT: 213 LBS | HEIGHT: 66 IN

## 2023-12-08 DIAGNOSIS — E66.09 CLASS 1 OBESITY DUE TO EXCESS CALORIES WITHOUT SERIOUS COMORBIDITY WITH BODY MASS INDEX (BMI) OF 34.0 TO 34.9 IN ADULT: Primary | ICD-10-CM

## 2023-12-08 DIAGNOSIS — E66.811 CLASS 1 OBESITY DUE TO EXCESS CALORIES WITHOUT SERIOUS COMORBIDITY WITH BODY MASS INDEX (BMI) OF 34.0 TO 34.9 IN ADULT: Primary | ICD-10-CM

## 2023-12-08 PROCEDURE — 97803 MED NUTRITION INDIV SUBSEQ: CPT | Mod: 95

## 2023-12-08 NOTE — PATIENT INSTRUCTIONS
Pablito Alex!      It was great chatting with you today! Here's a summary of the goals we discussed:    1. To help with cravings:  - If interested in restarting Phentermine, let Kamila know and she will order refills  - Increase fiber at breakfast by adding some fruit    Examples:  - 3 eggs + 1 apple + 1 slice of whole grain toast  - 1c yogurt + 1/2c berries + 1/2c high-fiber cereal  - 2 eggs + 1 sausage link + 1 slice whole grain toast topped with avocado  - 1c cottage cheese + 1/2c peaches + 1 serving of whole grain crackers  - fruit of choice + breakfast sandwich (english muffin, egg, lean deli meat, cheese)  - protein shake + fruit     2. Increase water  - Aim for at least 64oz per day  - This might also help with cravings!  - If plain water isn't appealing, continue to use flavors: crystal light, akira, powerade/gatorade zero, vitamin water zero, la croix or bubly water, etc  - decaf or herbal tea is a great way to increase hydration in the winter    3. Reduce alcohol intake  - As discussed, we can either work on reducing volume or frequency. Any reduction is great, but there is a slight metabolic benefit to reducing frequency.   - Try either limiting drinks to every other day, or continuing with daily but a limit of 1-2 beers  - Check out [place] [employer] - this is a great program for those looking to modestly reduce their alcohol intake    4. Recipe resources:  Mealime (choose the low mary, low carb, or high protein options)  Eat This Much (calorie range: 6107-0649 kcals)  Diabetes Food Hub (great for both diabetics and non-diabetics)      Plan on following up in 2-3 months. This can be scheduled via our call center at . Reach out sooner with any questions or concerns. Have a great day!      Jazzmine Berry RD, LD  Clinical Dietitian

## 2024-01-16 ENCOUNTER — LAB (OUTPATIENT)
Dept: LAB | Facility: CLINIC | Age: 39
End: 2024-01-16
Payer: COMMERCIAL

## 2024-01-16 DIAGNOSIS — E66.811 CLASS 1 OBESITY DUE TO EXCESS CALORIES WITHOUT SERIOUS COMORBIDITY WITH BODY MASS INDEX (BMI) OF 34.0 TO 34.9 IN ADULT: ICD-10-CM

## 2024-01-16 DIAGNOSIS — E66.09 CLASS 1 OBESITY DUE TO EXCESS CALORIES WITHOUT SERIOUS COMORBIDITY WITH BODY MASS INDEX (BMI) OF 34.0 TO 34.9 IN ADULT: ICD-10-CM

## 2024-01-16 PROCEDURE — 80048 BASIC METABOLIC PNL TOTAL CA: CPT

## 2024-01-16 PROCEDURE — 36415 COLL VENOUS BLD VENIPUNCTURE: CPT

## 2024-01-17 LAB
ANION GAP SERPL CALCULATED.3IONS-SCNC: 9 MMOL/L (ref 7–15)
BUN SERPL-MCNC: 17.4 MG/DL (ref 6–20)
CALCIUM SERPL-MCNC: 9.5 MG/DL (ref 8.6–10)
CHLORIDE SERPL-SCNC: 104 MMOL/L (ref 98–107)
CREAT SERPL-MCNC: 1.17 MG/DL (ref 0.51–0.95)
DEPRECATED HCO3 PLAS-SCNC: 27 MMOL/L (ref 22–29)
EGFRCR SERPLBLD CKD-EPI 2021: 62 ML/MIN/1.73M2
GLUCOSE SERPL-MCNC: 89 MG/DL (ref 70–99)
POTASSIUM SERPL-SCNC: 4.4 MMOL/L (ref 3.4–5.3)
SODIUM SERPL-SCNC: 140 MMOL/L (ref 135–145)

## 2024-01-28 ENCOUNTER — MYC REFILL (OUTPATIENT)
Dept: SURGERY | Facility: CLINIC | Age: 39
End: 2024-01-28
Payer: COMMERCIAL

## 2024-01-28 DIAGNOSIS — E66.812 CLASS 2 OBESITY DUE TO EXCESS CALORIES WITHOUT SERIOUS COMORBIDITY WITH BODY MASS INDEX (BMI) OF 36.0 TO 36.9 IN ADULT: ICD-10-CM

## 2024-01-28 DIAGNOSIS — E66.09 CLASS 2 OBESITY DUE TO EXCESS CALORIES WITHOUT SERIOUS COMORBIDITY WITH BODY MASS INDEX (BMI) OF 36.0 TO 36.9 IN ADULT: ICD-10-CM

## 2024-01-29 ENCOUNTER — TELEPHONE (OUTPATIENT)
Dept: SURGERY | Facility: CLINIC | Age: 39
End: 2024-01-29

## 2024-01-29 RX ORDER — PHENTERMINE HYDROCHLORIDE 15 MG/1
CAPSULE ORAL
Qty: 30 CAPSULE | Refills: 1 | Status: SHIPPED | OUTPATIENT
Start: 2024-01-29 | End: 2024-03-07

## 2024-02-05 ENCOUNTER — E-VISIT (OUTPATIENT)
Dept: FAMILY MEDICINE | Facility: CLINIC | Age: 39
End: 2024-02-05
Payer: COMMERCIAL

## 2024-02-05 DIAGNOSIS — H57.89 REDNESS OF EYE, RIGHT: Primary | ICD-10-CM

## 2024-02-05 DIAGNOSIS — H57.11 EYE PAIN, RIGHT: ICD-10-CM

## 2024-02-05 PROCEDURE — 99422 OL DIG E/M SVC 11-20 MIN: CPT | Performed by: INTERNAL MEDICINE

## 2024-02-05 RX ORDER — OFLOXACIN 3 MG/ML
SOLUTION/ DROPS OPHTHALMIC
Qty: 5 ML | Refills: 0 | Status: SHIPPED | OUTPATIENT
Start: 2024-02-05 | End: 2024-02-12

## 2024-02-05 NOTE — TELEPHONE ENCOUNTER
Provider E-Visit time total (minutes): 11 minutes      Sent in [employer] message as below -     Pablito Alex     Sorry to hear that. Your Rt eye conjunctivitis appears as most likely bacterial conjunctivitis given your symptoms. Went ahead and sent in antibiotic drops to your pharmacy. If no improvement in 1 week, recommend Ophthalmology check on this with closer examination with slit lamp exam for possible other underlying diagnosis if any.     Please let me know if you have any questions.    Thank you  Kimberly Galeas MD on 2/5/2024 at 8:10 AM

## 2024-02-05 NOTE — PATIENT INSTRUCTIONS
Thank you for choosing us for your care. I have placed an order for a prescription so that you can start treatment. View your full visit summary for details by clicking on the link below. Your pharmacist will able to address any questions you may have about the medication.     If you re not feeling better within 2-3 days, please schedule an appointment.  You can schedule an appointment right here in [facility], or call [phone]  If the visit is for the same symptoms as your eVisit, we ll refund the cost of your eVisit if seen within seven days.    Pinkeye: Care Instructions  Overview     Pinkeye is redness and swelling of the eye surface and the conjunctiva (the lining of the eyelid and the covering of the white part of the eye). Pinkeye is also called conjunctivitis. Pinkeye is often caused by infection with bacteria or a virus. Dry air, allergies, smoke, and chemicals are other common causes.  Pinkeye often gets better on its own in 7 to 10 days. Antibiotics only help if the pinkeye is caused by bacteria. Pinkeye caused by infection spreads easily. If an allergy or chemical is causing pinkeye, it will not go away unless you can avoid whatever is causing it.  Follow-up care is a key part of your treatment and safety. Be sure to make and go to all appointments, and call your doctor if you are having problems. It's also a good idea to know your test results and keep a list of the medicines you take.  How can you care for yourself at home?  Wash your hands often. Always wash them before and after you treat pinkeye or touch your eyes or face.  Use moist cotton or a clean, wet cloth to remove crust. Wipe from the inside corner of the eye to the outside. Use a clean part of the cloth for each wipe.  Put cold or warm wet cloths on your eye a few times a day if the eye hurts.  Do not wear contact lenses or eye makeup until the pinkeye is gone. Throw away any eye makeup you were using when you got pinkeye. Clean your  "contacts and storage case. If you wear disposable contacts, use a new pair when your eye has cleared and it is safe to wear contacts again.  If the doctor gave you antibiotic ointment or eyedrops, use them as directed. Use the medicine for as long as instructed, even if your eye starts looking better soon. Keep the bottle tip clean, and do not let it touch the eye area.  To put in eyedrops or ointment:  Tilt your head back, and pull your lower eyelid down with one finger.  Drop or squirt the medicine inside the lower lid.  Close your eye for 30 to 60 seconds to let the drops or ointment move around.  Do not touch the ointment or dropper tip to your eyelashes or any other surface.  Do not share towels, pillows, or washcloths while you have pinkeye.  When should you call for help?   Call your doctor now or seek immediate medical care if:    You have pain in your eye, not just irritation on the surface.     You have a change in vision or loss of vision.     You have an increase in discharge from the eye.     Your eye has not started to improve or begins to get worse within 48 hours after you start using antibiotics.     Pinkeye lasts longer than 7 days.   Watch closely for changes in your health, and be sure to contact your doctor if you have any problems.  Where can you learn more?  Go to https://www.MDCapsule.net/patiented  Enter Y392 in the search box to learn more about \"Pinkeye: Care Instructions.\"  Current as of: June 6, 2023               Content Version: 13.8    0804-9868 [employer].   Care instructions adapted under license by your healthcare professional. If you have questions about a medical condition or this instruction, always ask your healthcare professional. [employer] disclaims any warranty or liability for your use of this information.       Taking Care of Pinkeye at Home (01:30)  Your health professional recommends that you watch this short online health video.  Learn ways " to ease the discomfort of pinkeye and keep the infection from spreading.  Purpose:  Describes basic home care for pinkeye to ease discomfort and prevent the spread of the infection.  Goal:  User will learn home treatment for pinkeye.     How to watch the video    Scan the QR code   OR Visit the website    https://link.JuiceBoxJungle.OpenTrust/[email or web address]   Current as of: June 6, 2023               Content Version: 13.8    2962-0077 [employer].   Care instructions adapted under license by your healthcare professional. If you have questions about a medical condition or this instruction, always ask your healthcare professional. [employer] disclaims any warranty or liability for your use of this information.

## 2024-02-09 NOTE — TELEPHONE ENCOUNTER
Prior Authorization Approval    Medication: PHENTERMINE HCL 15 MG PO CAPS  Authorization Effective Date: 1/10/2024  Authorization Expiration Date: 2/8/2025  Approved Dose/Quantity: 30 FOR 30 DAYS - 1QD   Insurance Company: [employer]ty [employer]'s - Phone [phone] Fax [phone]  Which Pharmacy is filling the prescription: [id number] - [place], [place] - [address] [place] [facility] [place] [address]  Pharmacy Notified: YES  Patient Notified: YES Pharmacy will notify patient once order is ready.

## 2024-02-27 NOTE — PROGRESS NOTES
3/7/2024      Return Medical Weight Management Note     Abi Dawkins  MRN:  [id number]  :  1985    Assessment & Plan   Problem List Items Addressed This Visit       Migraine without aura     On Topiramate, has triptan for rescue         Relevant Medications    topiramate (TOPAMAX) 50 MG tablet    Class 1 obesity due to excess calories without serious comorbidity with body mass index (BMI) of 33.0 to 33.9 in adult - Primary     Patient was congratulated on wt loss success thus far. Healthy habits to assist with further weight loss were discussed. Abi will discontinue phentermine 15 mg.  She will start 8 mg daily to see if this improves her sleep. We discussed diethylpropion as an alternative option as well as GLP-1 agonists.  She will increase Topiramate to 50 mg twice daily to see if this helps with nightly snacking.      She will work on prepping protein and veggie grab and go options on the weekends.              Relevant Medications    topiramate (TOPAMAX) 50 MG tablet    phentermine (LOMAIRA) 8 MG tablet        AOM Considerations:  Phentermine:   Working well but causing problem with sleep- early waking.   Topiramate:     Candidate, drinks 1 beers a night, helping with snacking, portion control, Using Condoms only for BC.    GLP-1:             Wegovy covered but not available       Naltrexone:      Tried Contrave and did not feel effective  Wellbutrin:       Tried Contrave and did not feel effective  Metformin:       No DM, Pre DM  Contrave:        Tried and did not feel effective  Qsymia:           Not covered                 PATIENT INSTRUCTIONS:  Continue Topiramate    Stop from Phentermine 15 mg    Start Phentermine 8 mg daily to see if this helps with sleep  Look for [employer] coupon    Goals:  Work on prepping things for the week that are non carb options.      Look at protein: Greek yogurt, cheese stick, meat stick  Veggies you can prep, cucumbers, peppers, carrots, cereal, snap  peas    FOLLOW-UP:    Please call [phone] to schedule your next visit in 3-4 months.     40 minutes spent on the date of the encounter doing chart review, history and exam, result review, counseling, developing plan of care, documentation, and further activities as noted      INTERVAL HISTORY:  Abi returns for medical weight management follow up.  Last seen on 11/30/2023.  Continues Topiramate 75 mg daily. Restarted phentermine-was helping more than she thought.  Could tell a difference since on it. Curbs hunger over all.  Portions sizes are smaller.   End of the day she has some snackiness.  Has followed up with KELSEY Dover.  Knows what to do but implementation is hard to adhere to.    Goals:  Talk to  about goal to reduce alcohol by 50%- Is social alcohol user.  It is a big piece of her life.  She has about 1 glass a day.    Focus on hydration- met  Add in strength training 5 minutes a day- not met    WEIGHT METRICS:  Body mass index is 33.25 kg/m .   Current Weight: 206 lb (93.4 kg)  Last Visits Weight: 213 lb (96.6 kg)  Initial Weight (lbs): 228 lbs  Cumulative weight loss (lbs): 22  Weight Loss Percentage: 9.65%    Wt Readings from Last 10 Encounters:   03/07/24 206 lb (93.4 kg)   12/08/23 213 lb (96.6 kg)   11/30/23 213 lb (96.6 kg)   08/14/23 228 lb (103.4 kg)   04/21/23 227 lb (103 kg)   11/29/22 224 lb (101.6 kg)   10/04/22 240 lb (108.9 kg)   09/27/22 239 lb (108.4 kg)   09/15/22 240 lb (108.9 kg)   09/06/22 239 lb 12.8 oz (108.8 kg)                 3/5/2024     4:30 AM   Weight Loss Medication History Reviewed With Patient   Which weight loss medications are you currently taking on a regular basis? Phentermine    Topamax (topiramate)   Are you having any side effects from the weight loss medication that we have prescribed you? Yes   If you are having side effects please describe: Insomnia   Wakes up from sleep with Phentermine.          3/5/2024     4:30 AM   Changes and Difficulties   I  "have made the following changes to my diet since my last visit: Increase water intake   With regards to my diet, I am still struggling with: Not enough protein and vegetables, alcohol   I have made the following changes to my activity/exercise since my last visit: Walking   With regards to my activity/exercise, I am still struggling with: Consistency   If she does not eat enough during the day then she loads up at night on snacks.  Needs help on grab and go healthy options.  Current grab and go are carb heavy    LABS:  Reviewed in Epic    BP Readings from Last 6 Encounters:   03/07/24 108/54   11/30/23 125/85   08/14/23 130/86   04/21/23 120/88   11/29/22 120/76   10/07/22 118/85       Pulse Readings from Last 6 Encounters:   03/07/24 75   11/30/23 73   08/14/23 86   04/21/23 73   10/07/22 58   10/03/22 84       PE:  /54   Pulse 75   Ht 5' 6\" (1.676 m)   Wt 206 lb (93.4 kg)   SpO2 99%   BMI 33.25 kg/m    GENERAL: Healthy, alert and no distress  RESP: No audible wheeze, cough, or visible cyanosis.  No visible retractions or increased work of breathing.    SKIN: Visible skin clear. No significant rash, abnormal pigmentation or lesions.  PSYCH: Mentation appears normal, affect normal/bright, judgement and insight intact        "

## 2024-03-07 ENCOUNTER — OFFICE VISIT (OUTPATIENT)
Dept: SURGERY | Facility: CLINIC | Age: 39
End: 2024-03-07
Payer: COMMERCIAL

## 2024-03-07 VITALS
OXYGEN SATURATION: 99 % | HEIGHT: 66 IN | DIASTOLIC BLOOD PRESSURE: 54 MMHG | WEIGHT: 206 LBS | SYSTOLIC BLOOD PRESSURE: 108 MMHG | BODY MASS INDEX: 33.11 KG/M2 | HEART RATE: 75 BPM

## 2024-03-07 DIAGNOSIS — G43.009 MIGRAINE WITHOUT AURA AND WITHOUT STATUS MIGRAINOSUS, NOT INTRACTABLE: ICD-10-CM

## 2024-03-07 DIAGNOSIS — E66.09 CLASS 1 OBESITY DUE TO EXCESS CALORIES WITHOUT SERIOUS COMORBIDITY WITH BODY MASS INDEX (BMI) OF 33.0 TO 33.9 IN ADULT: Primary | ICD-10-CM

## 2024-03-07 DIAGNOSIS — E66.811 CLASS 1 OBESITY DUE TO EXCESS CALORIES WITHOUT SERIOUS COMORBIDITY WITH BODY MASS INDEX (BMI) OF 33.0 TO 33.9 IN ADULT: Primary | ICD-10-CM

## 2024-03-07 PROCEDURE — 99215 OFFICE O/P EST HI 40 MIN: CPT | Performed by: PHYSICIAN ASSISTANT

## 2024-03-07 RX ORDER — TOPIRAMATE 50 MG/1
50 TABLET, FILM COATED ORAL 2 TIMES DAILY
Qty: 180 TABLET | Refills: 1 | Status: SHIPPED | OUTPATIENT
Start: 2024-03-07 | End: 2024-07-29

## 2024-03-07 NOTE — ASSESSMENT & PLAN NOTE
Patient was congratulated on wt loss success thus far. Healthy habits to assist with further weight loss were discussed. Abi will discontinue phentermine 15 mg.  She will start 8 mg daily to see if this improves her sleep. We discussed diethylpropion as an alternative option as well as GLP-1 agonists.  She will increase Topiramate to 50 mg twice daily to see if this helps with nightly snacking.      She will work on prepping protein and veggie grab and go options on the weekends.

## 2024-03-07 NOTE — PATIENT INSTRUCTIONS
"To ensure quality you may receive a patient satisfaction survey. The greatest compliment you can give is \"Likely to Recommend.\"    Nice to talk with you today.  Thank you for your trust. Below is the plan discussed.-  VIVIAN Treviño      Plan:  Continue Topiramate    Stop from Phentermine 15 mg    Start Phentermine 8 mg daily to see if this helps with sleep  Look for [employer] coupon    Goals:  Work on prepping things for the week that are non carb options.      Look at protein: Greek yogurt, cheese stick, meat stick  Veggies you can prep, cucumbers, peppers, carrots, cereal, snap peas    FOLLOW-UP:    Please call [phone] to schedule your next visit in 3-4 months.     " [id number]-8/2/23-[facility]  Received: Today  Hailey Cabrera, KASSIDY  P [facility] Scheduling Pool  Surgery/Procedure RESCHEDULED:     Original Date:  6/2/23     New Date:  8/4/23    time 240   OR Location: [facility] ([facility])     Other changes to procedure?  No     Reason for change:  open wounds poss infection     Rescheduled via: Moved Original Case / New Case Request Not Needed     INSURANCE VERIFIED WITH PATIENT?  Yes     Thanks!               Case Information    Patient: Ethel Zaman [[id number]]   Case:  [id number]        [phone]

## 2024-03-19 ENCOUNTER — TELEPHONE (OUTPATIENT)
Dept: SURGERY | Facility: CLINIC | Age: 39
End: 2024-03-19
Payer: COMMERCIAL

## 2024-03-22 ENCOUNTER — PATIENT OUTREACH (OUTPATIENT)
Dept: CARE COORDINATION | Facility: CLINIC | Age: 39
End: 2024-03-22
Payer: COMMERCIAL

## 2024-04-05 ENCOUNTER — PATIENT OUTREACH (OUTPATIENT)
Dept: CARE COORDINATION | Facility: CLINIC | Age: 39
End: 2024-04-05
Payer: COMMERCIAL

## 2024-06-12 ASSESSMENT — PATIENT HEALTH QUESTIONNAIRE - PHQ9
SUM OF ALL RESPONSES TO PHQ QUESTIONS 1-9: 7
10. IF YOU CHECKED OFF ANY PROBLEMS, HOW DIFFICULT HAVE THESE PROBLEMS MADE IT FOR YOU TO DO YOUR WORK, TAKE CARE OF THINGS AT HOME, OR GET ALONG WITH OTHER PEOPLE: VERY DIFFICULT
SUM OF ALL RESPONSES TO PHQ QUESTIONS 1-9: 7

## 2024-06-13 ENCOUNTER — OFFICE VISIT (OUTPATIENT)
Dept: FAMILY MEDICINE | Facility: CLINIC | Age: 39
End: 2024-06-13
Payer: COMMERCIAL

## 2024-06-13 VITALS
DIASTOLIC BLOOD PRESSURE: 74 MMHG | SYSTOLIC BLOOD PRESSURE: 104 MMHG | BODY MASS INDEX: 35 KG/M2 | HEART RATE: 75 BPM | TEMPERATURE: 97.2 F | OXYGEN SATURATION: 98 % | HEIGHT: 65 IN | WEIGHT: 210.1 LBS | RESPIRATION RATE: 17 BRPM

## 2024-06-13 DIAGNOSIS — D17.0 LIPOMA OF NECK: ICD-10-CM

## 2024-06-13 DIAGNOSIS — Z00.00 ROUTINE GENERAL MEDICAL EXAMINATION AT A HEALTH CARE FACILITY: Primary | ICD-10-CM

## 2024-06-13 DIAGNOSIS — F33.0 MAJOR DEPRESSIVE DISORDER, RECURRENT EPISODE, MILD (H): ICD-10-CM

## 2024-06-13 DIAGNOSIS — L74.519 FOCAL HYPERHIDROSIS: ICD-10-CM

## 2024-06-13 DIAGNOSIS — Z13.220 ENCOUNTER FOR SCREENING FOR LIPID DISORDER: ICD-10-CM

## 2024-06-13 PROCEDURE — 99395 PREV VISIT EST AGE 18-39: CPT | Performed by: INTERNAL MEDICINE

## 2024-06-13 PROCEDURE — 96127 BRIEF EMOTIONAL/BEHAV ASSMT: CPT | Performed by: INTERNAL MEDICINE

## 2024-06-13 PROCEDURE — 90480 ADMN SARSCOV2 VAC 1/ONLY CMP: CPT | Performed by: INTERNAL MEDICINE

## 2024-06-13 PROCEDURE — 99214 OFFICE O/P EST MOD 30 MIN: CPT | Mod: 25 | Performed by: INTERNAL MEDICINE

## 2024-06-13 PROCEDURE — 91320 SARSCV2 VAC 30MCG TRS-SUC IM: CPT | Performed by: INTERNAL MEDICINE

## 2024-06-13 RX ORDER — BUSPIRONE HYDROCHLORIDE 15 MG/1
15 TABLET ORAL AT BEDTIME
COMMUNITY
Start: 2024-04-27

## 2024-06-13 RX ORDER — GUANFACINE 2 MG/1
2 TABLET, EXTENDED RELEASE ORAL AT BEDTIME
COMMUNITY
Start: 2024-04-27

## 2024-06-13 SDOH — HEALTH STABILITY: PHYSICAL HEALTH: ON AVERAGE, HOW MANY DAYS PER WEEK DO YOU ENGAGE IN MODERATE TO STRENUOUS EXERCISE (LIKE A BRISK WALK)?: 2 DAYS

## 2024-06-13 SDOH — HEALTH STABILITY: PHYSICAL HEALTH: ON AVERAGE, HOW MANY MINUTES DO YOU ENGAGE IN EXERCISE AT THIS LEVEL?: 30 MIN

## 2024-06-13 ASSESSMENT — SOCIAL DETERMINANTS OF HEALTH (SDOH): HOW OFTEN DO YOU GET TOGETHER WITH FRIENDS OR RELATIVES?: ONCE A WEEK

## 2024-06-13 ASSESSMENT — ANXIETY QUESTIONNAIRES
7. FEELING AFRAID AS IF SOMETHING AWFUL MIGHT HAPPEN: NOT AT ALL
4. TROUBLE RELAXING: SEVERAL DAYS
GAD7 TOTAL SCORE: 6
2. NOT BEING ABLE TO STOP OR CONTROL WORRYING: SEVERAL DAYS
GAD7 TOTAL SCORE: 6
3. WORRYING TOO MUCH ABOUT DIFFERENT THINGS: SEVERAL DAYS
6. BECOMING EASILY ANNOYED OR IRRITABLE: MORE THAN HALF THE DAYS
IF YOU CHECKED OFF ANY PROBLEMS ON THIS QUESTIONNAIRE, HOW DIFFICULT HAVE THESE PROBLEMS MADE IT FOR YOU TO DO YOUR WORK, TAKE CARE OF THINGS AT HOME, OR GET ALONG WITH OTHER PEOPLE: VERY DIFFICULT
7. FEELING AFRAID AS IF SOMETHING AWFUL MIGHT HAPPEN: NOT AT ALL
8. IF YOU CHECKED OFF ANY PROBLEMS, HOW DIFFICULT HAVE THESE MADE IT FOR YOU TO DO YOUR WORK, TAKE CARE OF THINGS AT HOME, OR GET ALONG WITH OTHER PEOPLE?: VERY DIFFICULT
GAD7 TOTAL SCORE: 6
5. BEING SO RESTLESS THAT IT IS HARD TO SIT STILL: NOT AT ALL
1. FEELING NERVOUS, ANXIOUS, OR ON EDGE: SEVERAL DAYS

## 2024-06-13 ASSESSMENT — PAIN SCALES - GENERAL: PAINLEVEL: NO PAIN (0)

## 2024-06-13 NOTE — PROGRESS NOTES
Preventive Care Visit  [facility] MAURIZIO Galeas MD, Internal Medicine  Jun 13, 2024        Assessment and Plan  1. Routine general medical examination at a health care facility    Last seen pt on 4/21/2023 for annual physical at that time. Pt states that she follows [facility] who manages her medications including ADHD , Depression .   Follows neurology for migraine and Surgery for weight management .   - guanFACINE (INTUNIV) 2 MG TB24 24 hr tablet; Take 2 mg by mouth at bedtime  - busPIRone (BUSPAR) 15 MG tablet; Take 15 mg by mouth at bedtime  - COVID-19 12+ (2023-24) (PFIZER)  - REVIEW OF HEALTH MAINTENANCE PROTOCOL ORDERS  - PRIMARY CARE FOLLOW-UP SCHEDULING; Future    2. Major depressive disorder, recurrent episode, mild (H24)  - busPIRone (BUSPAR) 15 MG tablet; Take 15 mg by mouth at bedtime    3. Focal hyperhidrosis  New problem, given patient ongoing localized scalp & forehead sweating which she endorses but denies any flexural or extremity sweating . Will consider symptomatic treatment and consider referral to dermatology for this uncommon presentation.   - Adult Dermatology  Referral; Future  - aluminum chloride (DRYSOL) 20 % external solution; Apply topically at bedtime  Dispense: 60 mL; Refill: 0    4. Lipoma of neck  New problem, identified on the nape of neck which will check for Ultrasound before placing general surgery referral which pt understood and agreed with the plan.   - US Head Neck Soft Tissue; Future  - Adult Gen Surg  Referral; Future    5. Encounter for screening for lipid disorder  - Lipid panel reflex to direct LDL Fasting; Future        Please note that this note consists of symbols derived from keyboarding, dictation and/or voice recognition software. As a result, there may be errors in the script that have gone undetected. Please consider this when interpreting information found in this chart.    Patient Instructions   As  "discussed, please do fasting LAB only appointment     Sent in medication for your sweating concern and also plan for dermatology referral given the Unusual site of Scalp hyperhydrosis.    Placed Ultrasound orders for neck lipoma.       ==============================    Patient Education  Preventive Care Advice   This is general advice we often give to help people stay healthy. Your care team may have specific advice just for you. Please talk to your care team about your own preventive care needs.  Lifestyle  Exercise at least 150 minutes each week (30 minutes a day, 5 days a week).  Do muscle strengthening activities 2 days a week. These help control your weight and prevent disease.  No smoking.  Wear sunscreen to prevent skin cancer.  Have your home tested for radon every 2 to 5 years. Radon is a colorless, odorless gas that can harm your lungs. To learn more, go to www.health.[email or web address].mn.us and search for \"Radon in Homes.\"  Keep guns unloaded and locked up in a safe place like a safe or gun vault, or, use a gun lock and hide the keys. Always lock away bullets separately. To learn more, visit [employer].[place].gov and search for \"safe gun storage.\"  Nutrition  Eat 5 or more servings of fruits and vegetables each day.  Try wheat bread, brown rice and whole grain pasta (instead of white bread, rice, and pasta).  Get enough calcium and vitamin D. Check the label on foods and aim for 100% of the RDA (recommended daily allowance).  Regular exams  Have a dental exam and cleaning every 6 months.  See your health care team every year to talk about:  Any changes in your health.  Any medicines your care team has prescribed.  Preventive care, family planning, and ways to prevent chronic diseases.  Shots (vaccines)   HPV shots (up to age 26), if you've never had them before.  Hepatitis B shots (up to age 59), if you've never had them before.  COVID-19 shot: Get this shot when it's due.  Flu shot: Get a flu shot every year.  Tetanus shot: " Get a tetanus shot every 10 years.  Pneumococcal, hepatitis A, and RSV shots: Ask your care team if you need these based on your risk.  Shingles shot (for age 50 and up).  General health tests  Diabetes screening:  Starting at age 35, Get screened for diabetes at least every 3 years.  If you are younger than age 35, ask your care team if you should be screened for diabetes.  Cholesterol test: At age 39, start having a cholesterol test every 5 years, or more often if advised.  Bone density scan (DEXA): At age 50, ask your care team if you should have this scan for osteoporosis (brittle bones).  Hepatitis C: Get tested at least once in your life.  Abdominal aortic aneurysm screening: Talk to your doctor about having this screening if you:  Have ever smoked; and  Are biologically male; and  Are between the ages of 65 and 75.  STIs (sexually transmitted infections)  Before age 24: Ask your care team if you should be screened for STIs.  After age 24: Get screened for STIs if you're at risk. You are at risk for STIs (including HIV) if:  You are sexually active with more than one person.  You don't use condoms every time.  You or a partner was diagnosed with a sexually transmitted infection.  If you are at risk for HIV, ask about PrEP medicine to prevent HIV.  Get tested for HIV at least once in your life, whether you are at risk for HIV or not.  Cancer screening tests  Cervical cancer screening: If you have a cervix, begin getting regular cervical cancer screening tests at age 21. Most people who have regular screenings with normal results can stop after age 65. Talk about this with your provider.  Breast cancer scan (mammogram): If you've ever had breasts, begin having regular mammograms starting at age 40. This is a scan to check for breast cancer.  Colon cancer screening: It is important to start screening for colon cancer at age 45.  Have a colonoscopy test every 10 years (or more often if you're at risk) Or, ask your  provider about stool tests like a FIT test every year or Cologuard test every 3 years.  To learn more about your testing options, visit: www.Biodesy/689365.pdf.  For help making a decision, visit: carloz/mp26392.  Prostate cancer screening test: If you have a prostate and are age 55 to 69, ask your provider if you would benefit from a yearly prostate cancer screening test.  Lung cancer screening: If you are a current or former smoker age 50 to 80, ask your care team if ongoing lung cancer screenings are right for you.  For informational purposes only. Not to replace the advice of your health care provider. Copyright   2023 [facility]. All rights reserved. Clinically reviewed by the [facility]. [employer] [id number] - REV 04/24.  Learning About Stress  What is stress?     Stress is your body's response to a hard situation. Your body can have a physical, emotional, or mental response. Stress is a fact of life for most people, and it affects everyone differently. What causes stress for you may not be stressful for someone else.  A lot of things can cause stress. You may feel stress when you go on a job interview, take a test, or run a race. This kind of short-term stress is normal and even useful. It can help you if you need to work hard or react quickly. For example, stress can help you finish an important job on time.  Long-term stress is caused by ongoing stressful situations or events. Examples of long-term stress include long-term health problems, ongoing problems at work, or conflicts in your family. Long-term stress can harm your health.  How does stress affect your health?  When you are stressed, your body responds as though you are in danger. It makes hormones that speed up your heart, make you breathe faster, and give you a burst of energy. This is called the fight-or-flight stress response. If the stress is over quickly, your body goes back to normal and no harm is  done.  But if stress happens too often or lasts too long, it can have bad effects. Long-term stress can make you more likely to get sick, and it can make symptoms of some diseases worse. If you tense up when you are stressed, you may develop neck, shoulder, or low back pain. Stress is linked to high blood pressure and heart disease.  Stress also harms your emotional health. It can make you khan, tense, or depressed. Your relationships may suffer, and you may not do well at work or school.  What can you do to manage stress?  You can try these things to help manage stress:   Do something active. Exercise or activity can help reduce stress. Walking is a great way to get started. Even everyday activities such as housecleaning or yard work can help.  Try yoga or aleks chi. These techniques combine exercise and meditation. You may need some training at first to learn them.  Do something you enjoy. For example, listen to music or go to a movie. Practice your hobby or do volunteer work.  Meditate. This can help you relax, because you are not worrying about what happened before or what may happen in the future.  Do guided imagery. Imagine yourself in any setting that helps you feel calm. You can use online videos, books, or a teacher to guide you.  Do breathing exercises. For example:  From a standing position, bend forward from the waist with your knees slightly bent. Let your arms dangle close to the floor.  Breathe in slowly and deeply as you return to a standing position. Roll up slowly and lift your head last.  Hold your breath for just a few seconds in the standing position.  Breathe out slowly and bend forward from the waist.  Let your feelings out. Talk, laugh, cry, and express anger when you need to. Talking with supportive friends or family, a counselor, or a daisy leader about your feelings is a healthy way to relieve stress. Avoid discussing your feelings with people who make you feel worse.  Write. It may help to  "write about things that are bothering you. This helps you find out how much stress you feel and what is causing it. When you know this, you can find better ways to cope.  What can you do to prevent stress?  You might try some of these things to help prevent stress:  Manage your time. This helps you find time to do the things you want and need to do.  Get enough sleep. Your body recovers from the stresses of the day while you are sleeping.  Get support. Your family, friends, and community can make a difference in how you experience stress.  Limit your news feed. Avoid or limit time on social media or news that may make you feel stressed.  Do something active. Exercise or activity can help reduce stress. Walking is a great way to get started.  Where can you learn more?  Go to https://www.CriticalMetrics.TrunqShow/patiented  Enter N032 in the search box to learn more about \"Learning About Stress.\"  Current as of: October 24, 2023               Content Version: 14.0    7828-4169 [employer].   Care instructions adapted under license by your healthcare professional. If you have questions about a medical condition or this instruction, always ask your healthcare professional. [employer] disclaims any warranty or liability for your use of this information.      Learning About Depression Screening  What is depression screening?  Depression screening is a way to see if you have depression symptoms. It may be done by a doctor or counselor. It's often part of a routine checkup. That's because your mental health is just as important as your physical health.  Depression is a mental health condition that affects how you feel, think, and act. You may:  Have less energy.  Lose interest in your daily activities.  Feel sad and grouchy for a long time.  Depression is very common. It affects people of all ages.  Many things can lead to depression. Some people become depressed after they have a stroke or find out they " "have a major illness like cancer or heart disease. The death of a loved one or a breakup may lead to depression. It can run in families. Most experts believe that a combination of inherited genes and stressful life events can cause it.  What happens during screening?  You may be asked to fill out a form about your depression symptoms. You and the doctor will discuss your answers. The doctor may ask you more questions to learn more about how you think, act, and feel.  What happens after screening?  If you have symptoms of depression, your doctor will talk to you about your options.  Doctors usually treat depression with medicines or counseling. Often, combining the two works best. Many people don't get help because they think that they'll get over the depression on their own. But people with depression may not get better unless they get treatment.  The cause of depression is not well understood. There may be many factors involved. But if you have depression, it's not your fault.  A serious symptom of depression is thinking about death or suicide. If you or someone you care about talks about this or about feeling hopeless, get help right away.  It's important to know that depression can be treated. Medicine, counseling, and self-care may help.  Where can you learn more?  Go to https://www.[employer].net/patiented  Enter T185 in the search box to learn more about \"Learning About Depression Screening.\"  Current as of: June 24, 2023               Content Version: 14.0    0029-3887 [employer].   Care instructions adapted under license by your healthcare professional. If you have questions about a medical condition or this instruction, always ask your healthcare professional. [employer] disclaims any warranty or liability for your use of this information.      Substance Use Disorder: Care Instructions  Overview     You can improve your life and health by stopping your use of alcohol or drugs. " When you don't drink or use drugs, you may feel and sleep better. You may get along better with your family, friends, and coworkers. There are medicines and programs that can help with substance use disorder.  How can you care for yourself at home?  Here are some ways to help you stay sober and prevent relapse.  If you have been given medicine to help keep you sober or reduce your cravings, be sure to take it exactly as prescribed.  Talk to your doctor about programs that can help you stop using drugs or drinking alcohol.  Do not keep alcohol or drugs in your home.  Plan ahead. Think about what you'll say if other people ask you to drink or use drugs. Try not to spend time with people who drink or use drugs.  Use the time and money spent on drinking or drugs to do something that's important to you.  Preventing a relapse  Have a plan to deal with relapse. Learn to recognize changes in your thinking that lead you to drink or use drugs. Get help before you start to drink or use drugs again.  Try to stay away from situations, friends, or places that may lead you to drink or use drugs.  If you feel the need to drink alcohol or use drugs again, seek help right away. Call a trusted friend or family member. Some people get support from organizations such as Narcotics Anonymous or [employer] or from treatment facilities.  If you relapse, get help as soon as you can. Some people make a plan with another person that outlines what they want that person to do for them if they relapse. The plan usually includes how to handle the relapse and who to notify in case of relapse.  Don't give up. Remember that a relapse doesn't mean that you have failed. Use the experience to learn the triggers that lead you to drink or use drugs. Then quit again. Recovery is a lifelong process. Many people have several relapses before they are able to quit for good.  Follow-up care is a key part of your treatment and safety. Be sure to make and go  "to all appointments, and call your doctor if you are having problems. It's also a good idea to know your test results and keep a list of the medicines you take.  When should you call for help?   Call 911  anytime you think you may need emergency care. For example, call if you or someone else:    Has overdosed or has withdrawal signs. Be sure to tell the emergency workers that you are or someone else is using or trying to quit using drugs. Overdose or withdrawal signs may include:  Losing consciousness.  Seizure.  Seeing or hearing things that aren't there (hallucinations).     Is thinking or talking about suicide or harming others.   Where to get help 24 hours a day, 7 days a week   If you or someone you know talks about suicide, self-harm, a mental health crisis, a substance use crisis, or any other kind of emotional distress, get help right away. You can:    Call the Suicide and Crisis Lifeline at 988.     Call 2-033-893-TALK (1-956.650.2617).     Text HOME to 493227 to access the Crisis Text Line.   Consider saving these numbers in your phone.  Go to [employer] for more information or to chat online.  Call your doctor now or seek immediate medical care if:    You are having withdrawal symptoms. These may include nausea or vomiting, sweating, shakiness, and anxiety.   Watch closely for changes in your health, and be sure to contact your doctor if:    You have a relapse.     You need more help or support to stop.   Where can you learn more?  Go to https://www.[employer].net/patiented  Enter H573 in the search box to learn more about \"Substance Use Disorder: Care Instructions.\"  Current as of: November 15, 2023               Content Version: 14.0    7911-9456 [employer].   Care instructions adapted under license by your healthcare professional. If you have questions about a medical condition or this instruction, always ask your healthcare professional. [employer] disclaims any " warranty or liability for your use of this information.         Return in about 1 year (around 6/13/2025), or if symptoms worsen or fail to improve, for Preventative Visit.    Kimberly Galeas MD  [facility] MAURIZIO Alex is a 39 year old, presenting for the following:  Physical        6/13/2024     1:56 PM   Additional Questions   Roomed by Audrey FLORES        Health Care Directive  Patient does not have a Health Care Directive or Living Will: Discussed advance care planning with patient; information given to patient to review.    HPI        6/13/2024   General Health   How would you rate your overall physical health? Good   Feel stress (tense, anxious, or unable to sleep) Rather much   (!) STRESS CONCERN      6/13/2024   Nutrition   Three or more servings of calcium each day? Yes   Diet: Regular (no restrictions)   How many servings of fruit and vegetables per day? (!) 2-3   How many sweetened beverages each day? 0-1         6/13/2024   Exercise   Days per week of moderate/strenous exercise 2 days   Average minutes spent exercising at this level 30 min   (!) EXERCISE CONCERN      6/13/2024   Social Factors   Frequency of gathering with friends or relatives Once a week   Worry food won't last until get money to buy more No   Food not last or not have enough money for food? No   Do you have housing?  Yes   Are you worried about losing your housing? No   Lack of transportation? No   Unable to get utilities (heat,electricity)? No         6/13/2024   Dental   Dentist two times every year? Yes         6/13/2024   TB Screening   Were you born outside of the [place]? No       Today's PHQ-9 Score:       6/12/2024     8:48 AM   PHQ-9 SCORE   PHQ-9 Total Score MyChart 7 (Mild depression)   PHQ-9 Total Score 7         6/13/2024   Substance Use   Alcohol more than 3/day or more than 7/wk No   Do you use any other substances recreationally? (!) CANNABIS PRODUCTS     Social History     Tobacco Use     Smoking status: Never    Smokeless tobacco: Never   Vaping Use    Vaping status: Never Used   Substance Use Topics    Alcohol use: Yes     Comment: one drink    Drug use: No          Mammogram Screening - Patient under 40 years of age: Routine Mammogram Screening not recommended.         6/13/2024   STI Screening   New sexual partner(s) since last STI/HIV test? No     History of abnormal Pap smear: No - age 30- 64 PAP with HPV every 5 years recommended        Latest Ref Rng & Units 10/1/2019    12:28 PM 10/1/2019    12:10 PM 5/19/2015    12:00 AM   PAP / HPV   PAP (Historical)  NIL   NIL    HPV 16 DNA NEG^Negative  Negative     HPV 18 DNA NEG^Negative  Negative     Other HR HPV NEG^Negative  Negative             6/13/2024   Contraception/Family Planning   Questions about contraception or family planning No        Reviewed and updated as needed this visit by Provider   Tobacco  Allergies  Meds  Problems  Med Hx  Surg Hx  Fam Hx            Past Medical History:   Diagnosis Date    Anxiety     Cholesteatoma 07/19/2012    COVID-19 09/19/2022    Sx's started Friday fatigue, chills, headache, runny nose. Saturday started coughing, weird episode of shakes-intense shivering. Yesterday loss sense of taste. No fevers. No known exposure    Depressive disorder     Hypertension     GHTN with second pregnancy    Major depressive disorder, recurrent episode, mild (H24)     Migraines      Past Surgical History:   Procedure Laterality Date    CHOLECYSTECTOMY  10/06/2022    DAVINCI LAPAROSCOPIC CHOLECYSTECTOMY WITH GRAMS N/A 10/06/2022    Procedure: Robotic cholecystectomy with intraoperative cholangiograms;  Surgeon: Zaynab An MD;  Location:  OR    ENT SURGERY  01/01/2011    typanamastoidectomy    ENT SURGERY Left [id number]    10 T-tube placements    LASIK CUSTOMVUE BILATERAL Bilateral 05/11/2015    Procedure: LASIK CUSTOMVUE BILATERAL;  Surgeon: Maycol Bella MD;  Location:  EC    WAVESCAN SCREENING  Bilateral 2015    Procedure: WAVESCAN SCREENING;  Surgeon: Maycol Bella MD;  Location: [facility]     OB History    Para Term  AB Living   2 2 2 0 0 2   SAB IAB Ectopic Multiple Live Births   0 0 0 0 2      # Outcome Date GA Lbr Benedict/2nd Weight Sex Type Anes PTL Lv   2 Term 10/04/22 38w5d 02:15 / 00:27 3.657 kg (8 lb 1 oz) M Vag-Spont EPI  JUJU      Name: Manan      Apgar1: 8  Apgar5: 9   1 Term 10/03/16 40w5d 07:00 / 03:17 3.55 kg (7 lb 13.2 oz) M Vag-Spont EPI N JUJU      Name: Kael      Apgar1: 8  Apgar5: 9     Lab work is in process  Labs reviewed in EPIC  BP Readings from Last 3 Encounters:   24 104/74   24 108/54   23 125/85    Wt Readings from Last 3 Encounters:   24 95.3 kg (210 lb 1.6 oz)   24 93.4 kg (206 lb)   23 96.6 kg (213 lb)                  Patient Active Problem List   Diagnosis    Anxiety    Migraine without aura    Major depressive disorder, recurrent episode, mild (H24)    History of gestational diabetes    Class 1 obesity due to excess calories without serious comorbidity with body mass index (BMI) of 33.0 to 33.9 in adult    COVID-19    S/P cholecystectomy    Anxiety state     Past Surgical History:   Procedure Laterality Date    CHOLECYSTECTOMY  10/06/2022    DAVINCI LAPAROSCOPIC CHOLECYSTECTOMY WITH GRAMS N/A 10/06/2022    Procedure: Robotic cholecystectomy with intraoperative cholangiograms;  Surgeon: Zaynab An MD;  Location:  OR    ENT SURGERY  2011    typanamastoidectomy    ENT SURGERY Left [id number]    10 T-tube placements    LASIK CUSTOMVUE BILATERAL Bilateral 2015    Procedure: LASIK CUSTOMVUE BILATERAL;  Surgeon: Maycol Bella MD;  Location: [facility]    WAVESCAN SCREENING Bilateral 2015    Procedure: WAVESCAN SCREENING;  Surgeon: Maycol Bella MD;  Location: [facility]       Social History     Tobacco Use    Smoking status: Never    Smokeless tobacco: Never   Substance Use Topics    Alcohol use: Yes      Comment: one drink     Family History   Problem Relation Age of Onset    Diabetes Father     Hypertension Father     Allergies Father     High cholesterol Father     Hyperlipidemia Father     Cerebrovascular Disease Father     Obesity Father     No Known Problems Mother     Asthma Brother     Hypertension Brother     Anxiety Disorder Brother     Obesity Brother     No Known Problems Sister     No Known Problems Maternal Grandmother     No Known Problems Maternal Grandfather     No Known Problems Paternal Grandmother     No Known Problems Other     Hypertension Brother     Anxiety Disorder Brother     Breast Cancer No family hx of     Colon Cancer No family hx of          Current Outpatient Medications   Medication Sig Dispense Refill    aluminum chloride (DRYSOL) 20 % external solution Apply topically at bedtime 60 mL 0    busPIRone (BUSPAR) 15 MG tablet Take 15 mg by mouth at bedtime      escitalopram (LEXAPRO) 20 MG tablet Take 1 tablet (20 mg) by mouth daily 90 tablet 3    guanFACINE (INTUNIV) 2 MG TB24 24 hr tablet Take 2 mg by mouth at bedtime      lamoTRIgine (LAMICTAL) 100 MG tablet Take 100 mg by mouth at bedtime      lamoTRIgine (LAMICTAL) 25 MG chewable tablet Take 25 mg by mouth every morning      phentermine (LOMAIRA) 8 MG tablet Take 1 tablet (8 mg) by mouth every morning (before breakfast) 30 tablet 3    propranolol (INDERAL) 20 MG tablet       rizatriptan (MAXALT-MLT) 5 MG ODT tab Take 1 tablet (5 mg) by mouth at onset of headache for migraine 9 tablet 3    topiramate (TOPAMAX) 50 MG tablet Take 1 tablet (50 mg) by mouth 2 times daily 180 tablet 1     Allergies   Allergen Reactions    Cats      Runny nose, redness under eyes, sniffling     Recent Labs   Lab Test 01/16/24  1533 04/21/23  1336 10/07/22  0550 10/05/22  0745 10/02/22  1129 12/06/19  1045 12/06/19  1044 09/21/18  0934   A1C  --  5.3  --   --   --   --   --  5.0   LDL  --   --   --   --   --  144*  --   --    HDL  --   --   --   --   --  54  " --   --    TRIG  --   --   --   --   --  111  --   --    ALT  --  15 81* 81*  81*   < >  --   --  22   CR 1.17* 1.16*  --  0.84   < >  --  0.94 0.97   GFRESTIMATED 62 62  --  >90   < >  --  80 67   GFRESTBLACK  --   --   --   --   --   --  >90 81   POTASSIUM 4.4 4.0  --  3.9  --   --   --  4.5   TSH  --  1.71  --   --   --   --  0.95 1.14    < > = values in this interval not displayed.          Review of Systems  Constitutional, HEENT, cardiovascular, pulmonary, GI, , musculoskeletal, neuro, skin, endocrine and psych systems are negative, except as otherwise noted.     Objective    Exam  /74   Pulse 75   Temp 97.2  F (36.2  C) (Temporal)   Resp 17   Ht 1.662 m (5' 5.43\")   Wt 95.3 kg (210 lb 1.6 oz)   LMP 05/19/2024 (Exact Date)   SpO2 98%   BMI 34.50 kg/m     Estimated body mass index is 34.5 kg/m  as calculated from the following:    Height as of this encounter: 1.662 m (5' 5.43\").    Weight as of this encounter: 95.3 kg (210 lb 1.6 oz).    Physical Exam  GENERAL: alert and no distress  EYES: Eyes grossly normal to inspection, PERRL and conjunctivae and sclerae normal  HENT: ear canals and TM's normal, nose and mouth without ulcers or lesions  NECK: no adenopathy, no asymmetry, masses, or scars  RESP: lungs clear to auscultation - no rales, rhonchi or wheezes  CV: regular rate and rhythm, normal S1 S2, no S3 or S4, no murmur, click or rub, no peripheral edema  ABDOMEN: soft, nontender, no hepatosplenomegaly, no masses and bowel sounds normal  MS: no gross musculoskeletal defects noted, no edema  SKIN: no suspicious lesions or rashes  NEURO: Normal strength and tone, mentation intact and speech normal  PSYCH: mentation appears normal, affect normal/bright    Answers submitted by the patient for this visit:  Patient Health Questionnaire (Submitted on 6/12/2024)  If you checked off any problems, how difficult have these problems made it for you to do your work, take care of things at home, or get " along with other people?: Very difficult  PHQ9 TOTAL SCORE: 7  LUCERO-7 (Submitted on 6/13/2024)  LUCERO 7 TOTAL SCORE: 6      Signed Electronically by: Kimberly Galeas MD

## 2024-06-13 NOTE — PATIENT INSTRUCTIONS
"As discussed, please do fasting LAB only appointment     Sent in medication for your sweating concern and also plan for dermatology referral given the Unusual site of Scalp hyperhydrosis.    Placed Ultrasound orders for neck lipoma.       ==============================    Patient Education   Preventive Care Advice   This is general advice we often give to help people stay healthy. Your care team may have specific advice just for you. Please talk to your care team about your own preventive care needs.  Lifestyle  Exercise at least 150 minutes each week (30 minutes a day, 5 days a week).  Do muscle strengthening activities 2 days a week. These help control your weight and prevent disease.  No smoking.  Wear sunscreen to prevent skin cancer.  Have your home tested for radon every 2 to 5 years. Radon is a colorless, odorless gas that can harm your lungs. To learn more, go to www.health.[facility].[place].us and search for \"Radon in Homes.\"  Keep guns unloaded and locked up in a safe place like a safe or gun vault, or, use a gun lock and hide the keys. Always lock away bullets separately. To learn more, visit [place].gov and search for \"safe gun storage.\"  Nutrition  Eat 5 or more servings of fruits and vegetables each day.  Try wheat bread, brown rice and whole grain pasta (instead of white bread, rice, and pasta).  Get enough calcium and vitamin D. Check the label on foods and aim for 100% of the RDA (recommended daily allowance).  Regular exams  Have a dental exam and cleaning every 6 months.  See your health care team every year to talk about:  Any changes in your health.  Any medicines your care team has prescribed.  Preventive care, family planning, and ways to prevent chronic diseases.  Shots (vaccines)   HPV shots (up to age 26), if you've never had them before.  Hepatitis B shots (up to age 59), if you've never had them before.  COVID-19 shot: Get this shot when it's due.  Flu shot: Get a flu shot every year.  Tetanus " shot: Get a tetanus shot every 10 years.  Pneumococcal, hepatitis A, and RSV shots: Ask your care team if you need these based on your risk.  Shingles shot (for age 50 and up).  General health tests  Diabetes screening:  Starting at age 35, Get screened for diabetes at least every 3 years.  If you are younger than age 35, ask your care team if you should be screened for diabetes.  Cholesterol test: At age 39, start having a cholesterol test every 5 years, or more often if advised.  Bone density scan (DEXA): At age 50, ask your care team if you should have this scan for osteoporosis (brittle bones).  Hepatitis C: Get tested at least once in your life.  Abdominal aortic aneurysm screening: Talk to your doctor about having this screening if you:  Have ever smoked; and  Are biologically male; and  Are between the ages of 65 and 75.  STIs (sexually transmitted infections)  Before age 24: Ask your care team if you should be screened for STIs.  After age 24: Get screened for STIs if you're at risk. You are at risk for STIs (including HIV) if:  You are sexually active with more than one person.  You don't use condoms every time.  You or a partner was diagnosed with a sexually transmitted infection.  If you are at risk for HIV, ask about PrEP medicine to prevent HIV.  Get tested for HIV at least once in your life, whether you are at risk for HIV or not.  Cancer screening tests  Cervical cancer screening: If you have a cervix, begin getting regular cervical cancer screening tests at age 21. Most people who have regular screenings with normal results can stop after age 65. Talk about this with your provider.  Breast cancer scan (mammogram): If you've ever had breasts, begin having regular mammograms starting at age 40. This is a scan to check for breast cancer.  Colon cancer screening: It is important to start screening for colon cancer at age 45.  Have a colonoscopy test every 10 years (or more often if you're at risk) Or,  ask your provider about stool tests like a FIT test every year or Cologuard test every 3 years.  To learn more about your testing options, visit: www.TapInko/492347.pdf.  For help making a decision, visit: carloz/vt38332.  Prostate cancer screening test: If you have a prostate and are age 55 to 69, ask your provider if you would benefit from a yearly prostate cancer screening test.  Lung cancer screening: If you are a current or former smoker age 50 to 80, ask your care team if ongoing lung cancer screenings are right for you.  For informational purposes only. Not to replace the advice of your health care provider. Copyright   2023 [facility]. All rights reserved. Clinically reviewed by the [facility] Transitions Program. [employer] 128545 - REV 04/24.  Learning About Stress  What is stress?     Stress is your body's response to a hard situation. Your body can have a physical, emotional, or mental response. Stress is a fact of life for most people, and it affects everyone differently. What causes stress for you may not be stressful for someone else.  A lot of things can cause stress. You may feel stress when you go on a job interview, take a test, or run a race. This kind of short-term stress is normal and even useful. It can help you if you need to work hard or react quickly. For example, stress can help you finish an important job on time.  Long-term stress is caused by ongoing stressful situations or events. Examples of long-term stress include long-term health problems, ongoing problems at work, or conflicts in your family. Long-term stress can harm your health.  How does stress affect your health?  When you are stressed, your body responds as though you are in danger. It makes hormones that speed up your heart, make you breathe faster, and give you a burst of energy. This is called the fight-or-flight stress response. If the stress is over quickly, your body goes back to normal and no  harm is done.  But if stress happens too often or lasts too long, it can have bad effects. Long-term stress can make you more likely to get sick, and it can make symptoms of some diseases worse. If you tense up when you are stressed, you may develop neck, shoulder, or low back pain. Stress is linked to high blood pressure and heart disease.  Stress also harms your emotional health. It can make you khan, tense, or depressed. Your relationships may suffer, and you may not do well at work or school.  What can you do to manage stress?  You can try these things to help manage stress:   Do something active. Exercise or activity can help reduce stress. Walking is a great way to get started. Even everyday activities such as housecleaning or yard work can help.  Try yoga or aleks chi. These techniques combine exercise and meditation. You may need some training at first to learn them.  Do something you enjoy. For example, listen to music or go to a movie. Practice your hobby or do volunteer work.  Meditate. This can help you relax, because you are not worrying about what happened before or what may happen in the future.  Do guided imagery. Imagine yourself in any setting that helps you feel calm. You can use online videos, books, or a teacher to guide you.  Do breathing exercises. For example:  From a standing position, bend forward from the waist with your knees slightly bent. Let your arms dangle close to the floor.  Breathe in slowly and deeply as you return to a standing position. Roll up slowly and lift your head last.  Hold your breath for just a few seconds in the standing position.  Breathe out slowly and bend forward from the waist.  Let your feelings out. Talk, laugh, cry, and express anger when you need to. Talking with supportive friends or family, a counselor, or a daisy leader about your feelings is a healthy way to relieve stress. Avoid discussing your feelings with people who make you feel worse.  Write. It may  "help to write about things that are bothering you. This helps you find out how much stress you feel and what is causing it. When you know this, you can find better ways to cope.  What can you do to prevent stress?  You might try some of these things to help prevent stress:  Manage your time. This helps you find time to do the things you want and need to do.  Get enough sleep. Your body recovers from the stresses of the day while you are sleeping.  Get support. Your family, friends, and community can make a difference in how you experience stress.  Limit your news feed. Avoid or limit time on social media or news that may make you feel stressed.  Do something active. Exercise or activity can help reduce stress. Walking is a great way to get started.  Where can you learn more?  Go to https://www.SiteJabber.net/patiented  Enter N032 in the search box to learn more about \"Learning About Stress.\"  Current as of: October 24, 2023               Content Version: 14.0    0513-0642 [employer].   Care instructions adapted under license by your healthcare professional. If you have questions about a medical condition or this instruction, always ask your healthcare professional. [employer] disclaims any warranty or liability for your use of this information.      Learning About Depression Screening  What is depression screening?  Depression screening is a way to see if you have depression symptoms. It may be done by a doctor or counselor. It's often part of a routine checkup. That's because your mental health is just as important as your physical health.  Depression is a mental health condition that affects how you feel, think, and act. You may:  Have less energy.  Lose interest in your daily activities.  Feel sad and grouchy for a long time.  Depression is very common. It affects people of all ages.  Many things can lead to depression. Some people become depressed after they have a stroke or find out " "they have a major illness like cancer or heart disease. The death of a loved one or a breakup may lead to depression. It can run in families. Most experts believe that a combination of inherited genes and stressful life events can cause it.  What happens during screening?  You may be asked to fill out a form about your depression symptoms. You and the doctor will discuss your answers. The doctor may ask you more questions to learn more about how you think, act, and feel.  What happens after screening?  If you have symptoms of depression, your doctor will talk to you about your options.  Doctors usually treat depression with medicines or counseling. Often, combining the two works best. Many people don't get help because they think that they'll get over the depression on their own. But people with depression may not get better unless they get treatment.  The cause of depression is not well understood. There may be many factors involved. But if you have depression, it's not your fault.  A serious symptom of depression is thinking about death or suicide. If you or someone you care about talks about this or about feeling hopeless, get help right away.  It's important to know that depression can be treated. Medicine, counseling, and self-care may help.  Where can you learn more?  Go to https://www.CLOUD SYSTEMS.net/patiented  Enter T185 in the search box to learn more about \"Learning About Depression Screening.\"  Current as of: June 24, 2023               Content Version: 14.0    8439-0912 [employer].   Care instructions adapted under license by your healthcare professional. If you have questions about a medical condition or this instruction, always ask your healthcare professional. [employer] disclaims any warranty or liability for your use of this information.      Substance Use Disorder: Care Instructions  Overview     You can improve your life and health by stopping your use of alcohol or " drugs. When you don't drink or use drugs, you may feel and sleep better. You may get along better with your family, friends, and coworkers. There are medicines and programs that can help with substance use disorder.  How can you care for yourself at home?  Here are some ways to help you stay sober and prevent relapse.  If you have been given medicine to help keep you sober or reduce your cravings, be sure to take it exactly as prescribed.  Talk to your doctor about programs that can help you stop using drugs or drinking alcohol.  Do not keep alcohol or drugs in your home.  Plan ahead. Think about what you'll say if other people ask you to drink or use drugs. Try not to spend time with people who drink or use drugs.  Use the time and money spent on drinking or drugs to do something that's important to you.  Preventing a relapse  Have a plan to deal with relapse. Learn to recognize changes in your thinking that lead you to drink or use drugs. Get help before you start to drink or use drugs again.  Try to stay away from situations, friends, or places that may lead you to drink or use drugs.  If you feel the need to drink alcohol or use drugs again, seek help right away. Call a trusted friend or family member. Some people get support from organizations such as Narcotics Anonymous or [employer] or from treatment facilities.  If you relapse, get help as soon as you can. Some people make a plan with another person that outlines what they want that person to do for them if they relapse. The plan usually includes how to handle the relapse and who to notify in case of relapse.  Don't give up. Remember that a relapse doesn't mean that you have failed. Use the experience to learn the triggers that lead you to drink or use drugs. Then quit again. Recovery is a lifelong process. Many people have several relapses before they are able to quit for good.  Follow-up care is a key part of your treatment and safety. Be sure to make  "and go to all appointments, and call your doctor if you are having problems. It's also a good idea to know your test results and keep a list of the medicines you take.  When should you call for help?   Call 911  anytime you think you may need emergency care. For example, call if you or someone else:    Has overdosed or has withdrawal signs. Be sure to tell the emergency workers that you are or someone else is using or trying to quit using drugs. Overdose or withdrawal signs may include:  Losing consciousness.  Seizure.  Seeing or hearing things that aren't there (hallucinations).     Is thinking or talking about suicide or harming others.   Where to get help 24 hours a day, 7 days a week   If you or someone you know talks about suicide, self-harm, a mental health crisis, a substance use crisis, or any other kind of emotional distress, get help right away. You can:    Call the Suicide and Crisis Lifeline at 188.     Call 1-113-124-TALK (1-648.963.1097).     Text HOME to 238002 to access the Crisis Text Line.   Consider saving these numbers in your phone.  Go to [employer] for more information or to chat online.  Call your doctor now or seek immediate medical care if:    You are having withdrawal symptoms. These may include nausea or vomiting, sweating, shakiness, and anxiety.   Watch closely for changes in your health, and be sure to contact your doctor if:    You have a relapse.     You need more help or support to stop.   Where can you learn more?  Go to https://www.ExpertBids.com.net/patiented  Enter H573 in the search box to learn more about \"Substance Use Disorder: Care Instructions.\"  Current as of: November 15, 2023               Content Version: 14.0    6447-3439 [employer].   Care instructions adapted under license by your healthcare professional. If you have questions about a medical condition or this instruction, always ask your healthcare professional. [employer] disclaims any " warranty or liability for your use of this information.

## 2024-06-24 ENCOUNTER — ANCILLARY PROCEDURE (OUTPATIENT)
Dept: ULTRASOUND IMAGING | Facility: CLINIC | Age: 39
End: 2024-06-24
Attending: INTERNAL MEDICINE
Payer: COMMERCIAL

## 2024-06-24 DIAGNOSIS — D17.0 LIPOMA OF NECK: ICD-10-CM

## 2024-06-24 PROCEDURE — 76536 US EXAM OF HEAD AND NECK: CPT

## 2024-07-01 ENCOUNTER — LAB (OUTPATIENT)
Dept: LAB | Facility: CLINIC | Age: 39
End: 2024-07-01
Payer: COMMERCIAL

## 2024-07-01 DIAGNOSIS — D17.0 LIPOMA OF NECK: ICD-10-CM

## 2024-07-01 DIAGNOSIS — Z00.00 ROUTINE GENERAL MEDICAL EXAMINATION AT A HEALTH CARE FACILITY: ICD-10-CM

## 2024-07-01 DIAGNOSIS — Z13.220 ENCOUNTER FOR SCREENING FOR LIPID DISORDER: ICD-10-CM

## 2024-07-01 DIAGNOSIS — L74.519 FOCAL HYPERHIDROSIS: ICD-10-CM

## 2024-07-01 DIAGNOSIS — F33.0 MAJOR DEPRESSIVE DISORDER, RECURRENT EPISODE, MILD (H): ICD-10-CM

## 2024-07-01 LAB
ALBUMIN SERPL BCG-MCNC: 4.2 G/DL (ref 3.5–5.2)
ALP SERPL-CCNC: 67 U/L (ref 40–150)
ALT SERPL W P-5'-P-CCNC: 12 U/L (ref 0–50)
ANION GAP SERPL CALCULATED.3IONS-SCNC: 9 MMOL/L (ref 7–15)
AST SERPL W P-5'-P-CCNC: 24 U/L (ref 0–45)
BILIRUB SERPL-MCNC: 0.6 MG/DL
BUN SERPL-MCNC: 13.7 MG/DL (ref 6–20)
CALCIUM SERPL-MCNC: 9.4 MG/DL (ref 8.6–10)
CHLORIDE SERPL-SCNC: 105 MMOL/L (ref 98–107)
CHOLEST SERPL-MCNC: 206 MG/DL
CREAT SERPL-MCNC: 1.05 MG/DL (ref 0.51–0.95)
DEPRECATED HCO3 PLAS-SCNC: 25 MMOL/L (ref 22–29)
EGFRCR SERPLBLD CKD-EPI 2021: 70 ML/MIN/1.73M2
ERYTHROCYTE [DISTWIDTH] IN BLOOD BY AUTOMATED COUNT: 12 % (ref 10–15)
FASTING STATUS PATIENT QL REPORTED: YES
FASTING STATUS PATIENT QL REPORTED: YES
GLUCOSE SERPL-MCNC: 122 MG/DL (ref 70–99)
HCT VFR BLD AUTO: 38.5 % (ref 35–47)
HDLC SERPL-MCNC: 55 MG/DL
HGB BLD-MCNC: 13.3 G/DL (ref 11.7–15.7)
LDLC SERPL CALC-MCNC: 129 MG/DL
MCH RBC QN AUTO: 32.2 PG (ref 26.5–33)
MCHC RBC AUTO-ENTMCNC: 34.5 G/DL (ref 31.5–36.5)
MCV RBC AUTO: 93 FL (ref 78–100)
NONHDLC SERPL-MCNC: 151 MG/DL
PLATELET # BLD AUTO: 298 10E3/UL (ref 150–450)
POTASSIUM SERPL-SCNC: 4.8 MMOL/L (ref 3.4–5.3)
PROT SERPL-MCNC: 7.3 G/DL (ref 6.4–8.3)
RBC # BLD AUTO: 4.13 10E6/UL (ref 3.8–5.2)
SODIUM SERPL-SCNC: 139 MMOL/L (ref 135–145)
TRIGL SERPL-MCNC: 109 MG/DL
TSH SERPL DL<=0.005 MIU/L-ACNC: 1.29 UIU/ML (ref 0.3–4.2)
WBC # BLD AUTO: 5.7 10E3/UL (ref 4–11)

## 2024-07-01 PROCEDURE — 36415 COLL VENOUS BLD VENIPUNCTURE: CPT

## 2024-07-01 PROCEDURE — 80053 COMPREHEN METABOLIC PANEL: CPT

## 2024-07-01 PROCEDURE — 85027 COMPLETE CBC AUTOMATED: CPT

## 2024-07-01 PROCEDURE — 80061 LIPID PANEL: CPT

## 2024-07-01 PROCEDURE — 84443 ASSAY THYROID STIM HORMONE: CPT

## 2024-07-02 NOTE — RESULT ENCOUNTER NOTE
Your Cholesterol is borderline normal. Given your age and no cardiac risk factors , recommend dietery management of avoiding high fat foods and red meat . Life style measures on weight reduction recommended.     Your Kidney function is showing baseline CKD / elevated creatinine as in the past though mildly improved. Please hydrate well , avoid Ibuprofen or Aleve if you are taking any. Take only tylenol if needed .     All your OTHER labs normal, you may see some highlighted which do not have Clinical significance.    Please let me know if you have any questions.  Kimberly Galeas MD on 7/1/2024   [facility]

## 2024-07-12 ENCOUNTER — TELEPHONE (OUTPATIENT)
Dept: SURGERY | Facility: CLINIC | Age: 39
End: 2024-07-12
Payer: COMMERCIAL

## 2024-07-12 NOTE — TELEPHONE ENCOUNTER
Prior Authorization Retail Medication Request    Medication/Dose: Semaglutide-Weight Management (WEGOVY) 0.5 MG/0.5ML pen 2 mL 1 7/12/2024 - --  Sig - Route: Inject 0.5 mg subcutaneously once a week - Subcutaneous  Semaglutide-Weight Management (WEGOVY) 1 MG/0.5ML pen 2 mL 0 7/12/2024 - --  Sig - Route: Inject 1 mg subcutaneously once a week - Subcutaneous    ICD code (if different than what is on RX):  [E66.09, Z68.33]     Previously Tried and Failed:  Diet/lifestyle    Rationale:  Weight management    Hx of obesity: Body mass index is 33.25 kg/m .   Current Weight: 206 lb (93.4 kg)    Insurance Name:  [employer] [facility]   Insurance ID:  [id number]       Pharmacy Information (if different than what is on RX)  Name:    [id number] - [place], [place] - [address] [place] [facility] [address]     Phone:  [phone]

## 2024-07-16 NOTE — TELEPHONE ENCOUNTER
Retail Pharmacy Prior Authorization Team   Phone: [phone]    EPA APPROVED, FAXED APPROVAL INFO TO PHARMACY AND NOTIFIED PATIENT VIA [employer] MESSAGE-

## 2024-07-18 ENCOUNTER — OFFICE VISIT (OUTPATIENT)
Dept: SURGERY | Facility: CLINIC | Age: 39
End: 2024-07-18
Payer: COMMERCIAL

## 2024-07-18 VITALS
HEART RATE: 79 BPM | DIASTOLIC BLOOD PRESSURE: 74 MMHG | OXYGEN SATURATION: 100 % | WEIGHT: 212 LBS | SYSTOLIC BLOOD PRESSURE: 110 MMHG | HEIGHT: 65 IN | BODY MASS INDEX: 35.32 KG/M2 | RESPIRATION RATE: 16 BRPM

## 2024-07-18 DIAGNOSIS — D17.0 LIPOMA OF NECK: ICD-10-CM

## 2024-07-18 PROCEDURE — 99213 OFFICE O/P EST LOW 20 MIN: CPT | Performed by: SURGERY

## 2024-07-18 NOTE — LETTER
July 22, 2024          Kimberly Galeas MD  [address] [place] [address]      RE:   Aib Dawkins 1985      Dear Colleague,    Thank you for referring your patient, Abi Dawkins, to [facility] at [facility]. Please see a copy of my visit note below.    Abi Dawkins is a 39 year old female who presented with a asymptomatic lump on her upper back at the base of her neck.  It does not give her symptoms.  She felt that it likely has gotten larger.  She wants to know if it is going to give her any serious issues.      PMH:  Abi Dawkins  has a past medical history of Anxiety, Cholesteatoma (07/19/2012), COVID-19 (09/19/2022), Depressive disorder, Hypertension, Major depressive disorder, recurrent episode, mild (H24), and Migraines.  PSH:  Abi Dawkins  has a past surgical history that includes ENT surgery (01/01/2011); ENT surgery (Left, [id number]); Lasik customvue bilateral (Bilateral, 05/11/2015); Wavescan screening (Bilateral, 05/11/2015); Davinci Laparoscopic Cholecystectomy with Grams (N/A, 10/06/2022); and Cholecystectomy (10/06/2022).     Home medications and allergies reviewed.     Social History:  Abi Dawkins  reports that she has never smoked. She has never used smokeless tobacco. She reports current alcohol use. She reports that she does not use drugs.  Family History:  Abi Dawkins family history includes Allergies in her father; Anxiety Disorder in her brother and brother; Asthma in her brother; Cerebrovascular Disease in her father; Diabetes in her father; High cholesterol in her father; Hyperlipidemia in her father; Hypertension in her brother, brother, and father; No Known Problems in her maternal grandfather, maternal grandmother, mother, paternal grandmother, sister, and another family member; Obesity in her brother and father.     ROS:  The 10 point Review of Systems is negative other than noted in the HPI.        Physical Exam:  Blood  "pressure 110/74, pulse 79, resp. rate 16, height 1.651 m (5' 5\"), weight 96.2 kg (212 lb), last menstrual period 05/19/2024, SpO2 100%, not currently breastfeeding.  212 lbs 0 oz     Patient has a pleasant affect and communicates well.   Pupils equal round and reactive to light.   No cervical lymphadenopathy or thyromegaly.   Lung fields clear, breathing comfortably.   Heart normal sinus rhythm.  No murmurs rubs or gallops.  Abdomen soft, nontender, nondistended.  Skin warm, dry.  4 cm very diffuse and difficult to localize subtile thickening of the subcutaneous tissues between the left shoulder and base of the neck.     All new lab and imaging data was reviewed.       Assessment and Plan: Abi Dawkins is a 39 year old female with a likely lipoma  1.  I discussed that this is a likely lipoma.  It may grow over time.  Removal would prove what it is.  She was happy with that explanation and wasn't keen on removal.  Given that it is asymptomatic and hard to feel it is probably best to watch.         Again, thank you for allowing me to participate in the care of your patient.      Sincerely,      Sundeep Broussard MD        "

## 2024-07-19 NOTE — PROGRESS NOTES
Surgery Consultation, [facility], TACO Broussard MD    Abi Dawkins MRN# [id number]   YOB: 1985 Age: 39 year old     PCP:  Kimberly Galeas [phone]    Chief Complaint:  Lump on the back    Pt was seen in consultation from Kimberly Galeas.    History of Present Illness:  Abi Dawkins is a 39 year old female who presented with a asymptomatic lump on her upper back at the base of her neck.  It does not give her symptoms.  She felt that it likely has gotten larger.  She wants to know if it is going to give her any serious issues.     PMH:  Abi Dawkins  has a past medical history of Anxiety, Cholesteatoma (07/19/2012), COVID-19 (09/19/2022), Depressive disorder, Hypertension, Major depressive disorder, recurrent episode, mild (H24), and Migraines.  PSH:  Abi Dawkins  has a past surgical history that includes ENT surgery (01/01/2011); ENT surgery (Left, [id number]); Lasik customvue bilateral (Bilateral, 05/11/2015); Wavescan screening (Bilateral, 05/11/2015); Davinci Laparoscopic Cholecystectomy with Grams (N/A, 10/06/2022); and Cholecystectomy (10/06/2022).    Home medications and allergies reviewed.    Social History:  Abi Dawkins  reports that she has never smoked. She has never used smokeless tobacco. She reports current alcohol use. She reports that she does not use drugs.  Family History:  Abi Dawkins family history includes Allergies in her father; Anxiety Disorder in her brother and brother; Asthma in her brother; Cerebrovascular Disease in her father; Diabetes in her father; High cholesterol in her father; Hyperlipidemia in her father; Hypertension in her brother, brother, and father; No Known Problems in her maternal grandfather, maternal grandmother, mother, paternal grandmother, sister, and another family member; Obesity in her brother and father.    ROS:  The 10 point Review of Systems is negative other than noted in the HPI.      Physical  "Exam:  Blood pressure 110/74, pulse 79, resp. rate 16, height 1.651 m (5' 5\"), weight 96.2 kg (212 lb), last menstrual period 05/19/2024, SpO2 100%, not currently breastfeeding.  212 lbs 0 oz    Patient has a pleasant affect and communicates well.   Pupils equal round and reactive to light.   No cervical lymphadenopathy or thyromegaly.   Lung fields clear, breathing comfortably.   Heart normal sinus rhythm.  No murmurs rubs or gallops.  Abdomen soft, nontender, nondistended.  Skin warm, dry.  4 cm very diffuse and difficult to localize subtile thickening of the subcutaneous tissues between the left shoulder and base of the neck.    All new lab and imaging data was reviewed.      Assessment and Plan: Abi Dawkins is a 39 year old female with a likely lipoma  1.  I discussed that this is a likely lipoma.  It may grow over time.  Removal would prove what it is.  She was happy with that explanation and wasn't keen on removal.  Given that it is asymptomatic and hard to feel it is probably best to watch.      Sundeep Broussard M.D.  [facility]  [phone]    Please route or send letter to:  Primary Care Provider (PCP) and Referring Provider  "

## 2024-07-27 NOTE — PROGRESS NOTES
2024      Return Medical Weight Management Note     Abi Dawkins  MRN:  [id number]  :  1985    Assessment & Plan   Problem List Items Addressed This Visit       Class 2 obesity due to excess calories without serious comorbidity with body mass index (BMI) of 35.0 to 35.9 in adult - Primary      Healthy habits to assist with further weight loss were discussed. Abi will discontinue phentermine due to insomnia.  She will continue Topiramate coronary artery disease which is.  Will start Wegovy titration.  Risks/ benefits and possible side effects were discussed and questions were answered. Written information was given.     May use famotidine and zofran ordered to be used prn heartburn/nausea.     Goals:  Add strength training-average of 20 minutes twice weekly  (helps preserve muscle mass and metabolism)    Follow up with dietician in 6 wks and myslef in Dec.          Relevant Medications    famotidine (PEPCID) 20 MG tablet    Semaglutide-Weight Management (WEGOVY) 1.7 MG/0.75ML pen    topiramate (TOPAMAX) 50 MG tablet    ondansetron (ZOFRAN ODT) 4 MG ODT tab    Other Relevant Orders    Hemoglobin A1c    Elevated glucose    Relevant Orders    Hemoglobin A1c        AOM Considerations:  Phentermine:   Working well but causing problem with sleep- early waking.   Topiramate:     Candidate, drinks 1 beers a night, helping with snacking, portion control, Using Condoms only for BC.    GLP-1:             Wegovy covered.    Naltrexone:      Tried Contrave and did not feel effective  Wellbutrin:       Tried Contrave and did not feel effective  Metformin:       No DM, Pre DM  Contrave:        Tried and did not feel effective  Qsymia:           Not covered         PATIENT INSTRUCTIONS:  Continue Topiramate    Stop phentermine    Start Wegovy (semaglutide)   0.25 mg once weekly for 4 weeks,   if tolerating increase to 0.5 mg weekly for 4 weeks,   if tolerating increase to 1 mg weekly,  if tolerating increase to 1.7  mg weekly    May use famotidine 20 mg twice daily as needed for nausea/heartburn when starting the medication.     Labs ordered.  Please call [phone] to set up a lab appt.     Goals:  Add strength training-average of 20 minutes twice weekly  (helps preserve muscle mass and metabolism)    Follow up:    Call [phone] to schedule next visit in December 41 minutes spent on the date of the encounter doing chart review, history and exam, result review, counseling, developing plan of care, documentation, and further activities as noted      INTERVAL HISTORY:  Abi returns for medical weight management follow up.  Last seen on 3/7/2024.  Ordered Wegovy. Approved 7/15/2024. Will discuss starting today.  Started phentermine but had trouble with insomnia.  Increase topiramate to twice daily to see if this helps with snacking.  Did not see a difference when she increased her Topiramate dose.  Sicne she decreased her phentermine dose    Used Miralax for constipation in the past and this has helped.  History of gestational diabetes.  Elevated glucose.      WEIGHT METRICS:  Body mass index is 35.45 kg/m .   Current Weight: 213 lb (96.6 kg)  Last Visits Weight: 206 lb (93.4 kg)  Initial Weight (lbs): 228 lbs  Cumulative weight loss (lbs): 15  Weight Loss Percentage: 6.58%    Wt Readings from Last 10 Encounters:   07/29/24 213 lb (96.6 kg)   07/18/24 212 lb (96.2 kg)   06/13/24 210 lb 1.6 oz (95.3 kg)   03/07/24 206 lb (93.4 kg)   12/08/23 213 lb (96.6 kg)   11/30/23 213 lb (96.6 kg)   08/14/23 228 lb (103.4 kg)   04/21/23 227 lb (103 kg)   11/29/22 224 lb (101.6 kg)   10/04/22 240 lb (108.9 kg)                 7/28/2024     9:33 PM   Weight Loss Medication History Reviewed With Patient   Which weight loss medications are you currently taking on a regular basis? Phentermine    Topamax (topiramate)   Are you having any side effects from the weight loss medication that we have prescribed you? No           7/28/2024      "9:33 PM   Changes and Difficulties   I have made the following changes to my diet since my last visit: I now have milk every day. Not sure if that is good or bad. Its just new for me.   With regards to my diet, I am still struggling with: Prepping meals to have enough protein at lunch. Night time snacking   I have made the following changes to my activity/exercise since my last visit: More active outside with kids in summer months       LABS:  Reviewed in Epic    BP Readings from Last 6 Encounters:   07/29/24 120/85   07/18/24 110/74   06/13/24 104/74   03/07/24 108/54   11/30/23 125/85   08/14/23 130/86       Pulse Readings from Last 6 Encounters:   07/29/24 61   07/18/24 79   06/13/24 75   03/07/24 75   11/30/23 73   08/14/23 86       PE:  /85   Pulse 61   Ht 5' 5\" (1.651 m)   Wt 213 lb (96.6 kg)   LMP 05/19/2024 (Exact Date)   SpO2 98%   BMI 35.45 kg/m    GENERAL: Healthy, alert and no distress  RESP: No audible wheeze, cough, or visible cyanosis.  No visible retractions or increased work of breathing.    SKIN: Visible skin clear. No significant rash, abnormal pigmentation or lesions.  PSYCH: Mentation appears normal, affect normal/bright, judgement and insight intact        "

## 2024-07-29 ENCOUNTER — OFFICE VISIT (OUTPATIENT)
Dept: SURGERY | Facility: CLINIC | Age: 39
End: 2024-07-29
Payer: COMMERCIAL

## 2024-07-29 VITALS
WEIGHT: 213 LBS | OXYGEN SATURATION: 98 % | BODY MASS INDEX: 35.49 KG/M2 | HEART RATE: 61 BPM | SYSTOLIC BLOOD PRESSURE: 120 MMHG | HEIGHT: 65 IN | DIASTOLIC BLOOD PRESSURE: 85 MMHG

## 2024-07-29 DIAGNOSIS — R73.09 ELEVATED GLUCOSE: ICD-10-CM

## 2024-07-29 DIAGNOSIS — E66.09 CLASS 2 OBESITY DUE TO EXCESS CALORIES WITHOUT SERIOUS COMORBIDITY WITH BODY MASS INDEX (BMI) OF 35.0 TO 35.9 IN ADULT: Primary | ICD-10-CM

## 2024-07-29 DIAGNOSIS — E66.812 CLASS 2 OBESITY DUE TO EXCESS CALORIES WITHOUT SERIOUS COMORBIDITY WITH BODY MASS INDEX (BMI) OF 35.0 TO 35.9 IN ADULT: Primary | ICD-10-CM

## 2024-07-29 PROCEDURE — 99215 OFFICE O/P EST HI 40 MIN: CPT | Performed by: PHYSICIAN ASSISTANT

## 2024-07-29 RX ORDER — TOPIRAMATE 50 MG/1
50 TABLET, FILM COATED ORAL 2 TIMES DAILY
Qty: 180 TABLET | Refills: 1 | Status: SHIPPED | OUTPATIENT
Start: 2024-07-29

## 2024-07-29 RX ORDER — FAMOTIDINE 20 MG/1
20 TABLET, FILM COATED ORAL 2 TIMES DAILY PRN
Qty: 180 TABLET | Refills: 1 | Status: SHIPPED | OUTPATIENT
Start: 2024-07-29

## 2024-07-29 RX ORDER — ONDANSETRON 4 MG/1
4 TABLET, ORALLY DISINTEGRATING ORAL EVERY 6 HOURS PRN
Qty: 30 TABLET | Refills: 0 | Status: SHIPPED | OUTPATIENT
Start: 2024-07-29

## 2024-07-29 RX ORDER — SEMAGLUTIDE 1.7 MG/.75ML
1.7 INJECTION, SOLUTION SUBCUTANEOUS WEEKLY
Qty: 3 ML | Refills: 1 | Status: SHIPPED | OUTPATIENT
Start: 2024-07-29

## 2024-07-29 NOTE — ASSESSMENT & PLAN NOTE
Healthy habits to assist with further weight loss were discussed. Abi will discontinue phentermine due to insomnia.  She will continue Topiramate coronary artery disease which is.  Will start Wegovy titration.  Risks/ benefits and possible side effects were discussed and questions were answered. Written information was given.     May use famotidine and zofran ordered to be used prn heartburn/nausea.     Goals:  Add strength training-average of 20 minutes twice weekly  (helps preserve muscle mass and metabolism)    Follow up with dietician in 6 wks and myslef in Dec.

## 2024-07-29 NOTE — PATIENT INSTRUCTIONS
Nice to talk with you today. Below is the plan discussed.-  VIVIAN Treviño      Pt Instructions:  Continue Topiramate    Stop phentermine    Start Wegovy (semaglutide)   0.25 mg once weekly for 4 weeks,   if tolerating increase to 0.5 mg weekly for 4 weeks,   if tolerating increase to 1 mg weekly,  if tolerating increase to 1.7 mg weekly    May use famotidine 20 mg twice daily as needed for nausea/heartburn when starting the medication.     Labs ordered.  Please call [phone] to set up a lab appt.     Goals:  Add strength training-average of 20 minutes twice weekly  (helps preserve muscle mass and metabolism)    Follow up:    Call [phone] to schedule next visit in December

## 2024-10-24 NOTE — PROGRESS NOTES
Abi is a 39 year old  female who presents for annual exam.     Besides routine health maintenance, she has no other health concerns today .    HPI:  Here today for yearly exam --doing well.  Regular, monthly menses x 4d.  No intermenstrual bleeding/spotting.  +SA --no issues.  Using condoms for contraception.  Has noticed some hemorrhoids --wondering about treatment, etc.  Discussed hydration, fiber, stool softeners, OTC meds.  Also having some FRNACISCO JAVIER with laugh/cough/sneeze.  Not doing kegel exercises    ; works for [employer] and working primarily from home; boys are doing well --7yo and 1yo!   laid off from [employer] last year and now self employed as wood worker/  -staying active with kids --lots of playing, outside time, etc  +SBE -no issues  PCP -Dr. Galeas; had yearly visit in ; follows labs, etc.  Started Weygovy this year  -agrees to flu and covid vaccines today      GYNECOLOGIC HISTORY:    Patient's last menstrual period was 10/08/2024 (exact date).    Regular menses? yes  Menses every 28 days.  Length of menses: 4 days    Her current contraception method is: condoms.  She  reports that she has never smoked. She has never used smokeless tobacco.    Patient is sexually active.  STD testing offered?  Declined  Last PHQ-9 score on record =       10/25/2024     4:00 PM   PHQ-9 SCORE   PHQ-9 Total Score 4     Last GAD7 score on record =       10/25/2024     4:00 PM   LUCERO-7 SCORE   Total Score 9     Alcohol Score = 3    HEALTH MAINTENANCE:  Cholesterol:   Cholesterol   Date Value Ref Range Status   2024 206 (H) <200 mg/dL Final   2019 220 (H) <200 mg/dL Final     Comment:     Desirable:       <200 mg/dl   03/15/2012 220 (H) 0 - 200 mg/dL Final     Comment:     LDL Cholesterol is the primary guide to therapy.   The NCEP recommends further evaluation of: patients with cholesterol greater   than 200 mg/dL if additional risk factors are present, cholesterol greater   than   240  mg/dL, triglycerides greater than 150 mg/dL, or HDL less than 40 mg/dL.   Last Mammo: Not applicable, Result: Not applicable, Next Mammo: Due at age 42   Pap:   Lab Results   Component Value Date    PAP NIL 10/01/2019    PAP NIL 2015     Colonoscopy:  NA, Result: Not applicable, Next Colonoscopy: Age 47 years.  Dexa:  nA    Health maintenance updated:  yes    HISTORY:  OB History    Para Term  AB Living   2 2 2 0 0 2   SAB IAB Ectopic Multiple Live Births   0 0 0 0 2      # Outcome Date GA Lbr Benedict/2nd Weight Sex Type Anes PTL Lv   2 Term 10/04/22 38w5d 02:15 / 00:27 3.657 kg (8 lb 1 oz) M Vag-Spont EPI  JUJU      Name: Manan      Apgar1: 8  Apgar5: 9   1 Term 10/03/16 40w5d 07:00 / 03:17 3.55 kg (7 lb 13.2 oz) M Vag-Spont EPI N JUJU      Name: Kael      Apgar1: 8  Apgar5: 9       Patient Active Problem List   Diagnosis    Anxiety    Migraine without aura    Major depressive disorder, recurrent episode, mild (H)    History of gestational diabetes    Class 2 obesity due to excess calories without serious comorbidity with body mass index (BMI) of 35.0 to 35.9 in adult    COVID-19    S/P cholecystectomy    Anxiety state    Elevated glucose     Past Surgical History:   Procedure Laterality Date    CHOLECYSTECTOMY  10/06/2022    DAVINCI LAPAROSCOPIC CHOLECYSTECTOMY WITH GRAMS N/A 10/06/2022    Procedure: Robotic cholecystectomy with intraoperative cholangiograms;  Surgeon: Zaynab An MD;  Location:  OR    ENT SURGERY  2011    typanamastoidectomy    ENT SURGERY Left [id number]    10 T-tube placements    LASIK CUSTOMVUE BILATERAL Bilateral 2015    Procedure: LASIK CUSTOMVUE BILATERAL;  Surgeon: Maycol Bella MD;  Location: [facility]    WAVESCAN SCREENING Bilateral 2015    Procedure: WAVESCAN SCREENING;  Surgeon: Maycol Bella MD;  Location: [facility]      Social History     Tobacco Use    Smoking status: Never    Smokeless tobacco: Never   Substance Use Topics    Alcohol use:  Yes     Comment: one drink      Problem (# of Occurrences) Relation (Name,Age of Onset)    Anxiety Disorder (2) Brother (Chava), Brother (Vazquez)    Allergies (1) Father (Bulmaro Harrison)    Asthma (1) Brother    Diabetes (1) Father (Bulmaro Harrison)    Hypertension (3) Father (Bulmaro Harrison), Brother (Chava), Brother (Vazquez)    Cerebrovascular Disease (1) Father (Bulmaro Harrison)    Obesity (2) Father (Bulmaro Harrison), Brother (Chava)    High cholesterol (1) Father (Bulmaro Harrison)    Hyperlipidemia (1) Father (Bulmaro Harrison)    No Known Problems (6) Mother, Sister, Maternal Grandmother, Maternal Grandfather, Paternal Grandmother, Other           Negative family history of: Breast Cancer, Colon Cancer              Current Outpatient Medications   Medication Sig Dispense Refill    aluminum chloride (DRYSOL) 20 % external solution Apply topically at bedtime 60 mL 0    busPIRone (BUSPAR) 15 MG tablet Take 15 mg by mouth at bedtime      escitalopram (LEXAPRO) 20 MG tablet Take 1 tablet (20 mg) by mouth daily 90 tablet 3    guanFACINE (INTUNIV) 2 MG TB24 24 hr tablet Take 2 mg by mouth at bedtime      lamoTRIgine (LAMICTAL) 200 MG tablet Take 1 tablet by mouth daily at 2 pm.      propranolol (INDERAL) 20 MG tablet       rizatriptan (MAXALT-MLT) 5 MG ODT tab Take 1 tablet (5 mg) by mouth at onset of headache for migraine 9 tablet 3    Semaglutide-Weight Management (WEGOVY) 1.7 MG/0.75ML pen Inject 1.7 mg subcutaneously once a week 3 mL 1    topiramate (TOPAMAX) 50 MG tablet Take 1 tablet (50 mg) by mouth 2 times daily 180 tablet 1     No current facility-administered medications for this visit.     Allergies   Allergen Reactions    Cats      Runny nose, redness under eyes, sniffling       Past medical, surgical, social and family histories were reviewed and updated in EPIC.    ROS:   12 point review of systems negative other than symptoms noted below or in the HPI.  No urinary frequency or dysuria, bladder or kidney problems, Normal menstrual  "cycles    EXAM:  /64   Ht 1.651 m (5' 5\")   Wt 90.3 kg (199 lb)   LMP 10/08/2024 (Exact Date)   BMI 33.12 kg/m     BMI: Body mass index is 33.12 kg/m .    PHYSICAL EXAM:  Constitutional:   Appearance: Well nourished, well developed, alert, in no acute distress  Neck:  Lymph Nodes:  No lymphadenopathy present    Thyroid:  Gland size normal, nontender, no nodules or masses present  on palpation  Chest:  Respiratory Effort:  Breathing unlabored  Cardiovascular:    Heart: Auscultation:  Regular rate, normal rhythm, no murmurs present  Breasts: Palpation of Breasts and Axillae:  No masses present on palpation, no breast tenderness., Axillary Lymph Nodes:  No lymphadenopathy present., and No nodularity, asymmetry or nipple discharge bilaterally.  Gastrointestinal:   Abdominal Examination:  Abdomen nontender to palpation, tone normal without rigidity or guarding, no masses present, umbilicus without lesions   Liver and Spleen:  No hepatomegaly present, liver nontender to palpation    Hernias:  No hernias present  Lymphatic: Lymph Nodes:  No other lymphadenopathy present  Skin:  General Inspection:  No rashes present, no lesions present, no areas of  discoloration  Neurologic:    Mental Status:  Oriented X3.  Normal strength and tone, sensory exam                grossly normal, mentation intact and speech normal.    Psychiatric:   Mentation appears normal and affect normal/bright.         Pelvic Exam:  External Genitalia:     Normal appearance for age, no discharge present, no tenderness present, no inflammatory lesions present, color normal  Vagina:     Normal vaginal vault without central or paravaginal defects, no discharge present, no inflammatory lesions present, no masses present  Bladder:     Nontender to palpation  Urethra:   Urethral Body:  Urethra palpation normal, urethra structural support normal   Urethral Meatus:  No erythema or lesions present  Cervix:     Appearance healthy, no lesions present, " nontender to palpation, no bleeding present  Uterus:     Uterus: firm, normal sized and nontender, anteverted in position.   Adnexa:     No adnexal tenderness present, no adnexal masses present  Perineum:     Perineum within normal limits, no evidence of trauma, no rashes or skin lesions present  Anus:     Anus within normal limits, no hemorrhoids present  Inguinal Lymph Nodes:     No lymphadenopathy present  Pubic Hair:     Normal pubic hair distribution for age  Genitalia and Groin:     No rashes present, no lesions present, no areas of discoloration, no masses present    COUNSELING:   Reviewed preventive health counseling, as reflected in patient instructions  Special attention given to:        Regular exercise       Healthy diet/nutrition       Family planning    BMI: Body mass index is 33.12 kg/m .  Weight management plan: Discussed healthy diet and exercise guidelines Patient was referred to their PCP to discuss a diet and exercise plan.    ASSESSMENT:  39 year old female with satisfactory annual exam.    ICD-10-CM    1. Encounter for gynecological examination without abnormal finding  Z01.419 HPV and Gynecologic Cytology Panel - Recommended Age 30 - 65 Years      2. Need for prophylactic vaccination and inoculation against influenza  Z23       3. High priority for 2019-nCoV vaccine  Z23           PLAN:  Patient Instructions   Follow up with your primary care provider for your other medical problems.  Continue self breast exam.  Increase physical activity and exercise.  Lab and pap smear results will be called to the patient.  Co-testing done today and will repeat in 5yrs if normal.    Usual safety and preventative measures counseling done.  BMI >25  Weight loss encouraged and applauded.  Flu Shot and covid booster today.       Zonia Hunter MD

## 2024-10-25 ENCOUNTER — OFFICE VISIT (OUTPATIENT)
Dept: OBGYN | Facility: CLINIC | Age: 39
End: 2024-10-25
Payer: COMMERCIAL

## 2024-10-25 VITALS
WEIGHT: 199 LBS | SYSTOLIC BLOOD PRESSURE: 118 MMHG | HEIGHT: 65 IN | DIASTOLIC BLOOD PRESSURE: 64 MMHG | BODY MASS INDEX: 33.15 KG/M2

## 2024-10-25 DIAGNOSIS — Z01.419 ENCOUNTER FOR GYNECOLOGICAL EXAMINATION WITHOUT ABNORMAL FINDING: Primary | ICD-10-CM

## 2024-10-25 DIAGNOSIS — Z23 NEED FOR PROPHYLACTIC VACCINATION AND INOCULATION AGAINST INFLUENZA: ICD-10-CM

## 2024-10-25 DIAGNOSIS — Z23 HIGH PRIORITY FOR 2019-NCOV VACCINE: ICD-10-CM

## 2024-10-25 PROCEDURE — 90471 IMMUNIZATION ADMIN: CPT | Performed by: OBSTETRICS & GYNECOLOGY

## 2024-10-25 PROCEDURE — 87624 HPV HI-RISK TYP POOLED RSLT: CPT | Performed by: OBSTETRICS & GYNECOLOGY

## 2024-10-25 PROCEDURE — G0145 SCR C/V CYTO,THINLAYER,RESCR: HCPCS | Performed by: OBSTETRICS & GYNECOLOGY

## 2024-10-25 PROCEDURE — 90480 ADMN SARSCOV2 VAC 1/ONLY CMP: CPT | Performed by: OBSTETRICS & GYNECOLOGY

## 2024-10-25 PROCEDURE — 91320 SARSCV2 VAC 30MCG TRS-SUC IM: CPT | Performed by: OBSTETRICS & GYNECOLOGY

## 2024-10-25 PROCEDURE — 99395 PREV VISIT EST AGE 18-39: CPT | Mod: 25 | Performed by: OBSTETRICS & GYNECOLOGY

## 2024-10-25 PROCEDURE — 90656 IIV3 VACC NO PRSV 0.5 ML IM: CPT | Performed by: OBSTETRICS & GYNECOLOGY

## 2024-10-25 PROCEDURE — 99459 PELVIC EXAMINATION: CPT | Performed by: OBSTETRICS & GYNECOLOGY

## 2024-10-25 RX ORDER — LAMOTRIGINE 200 MG/1
1 TABLET ORAL
COMMUNITY
Start: 2024-09-24

## 2024-10-25 ASSESSMENT — ANXIETY QUESTIONNAIRES
GAD7 TOTAL SCORE: 9
6. BECOMING EASILY ANNOYED OR IRRITABLE: MORE THAN HALF THE DAYS
1. FEELING NERVOUS, ANXIOUS, OR ON EDGE: SEVERAL DAYS
5. BEING SO RESTLESS THAT IT IS HARD TO SIT STILL: SEVERAL DAYS
2. NOT BEING ABLE TO STOP OR CONTROL WORRYING: SEVERAL DAYS
7. FEELING AFRAID AS IF SOMETHING AWFUL MIGHT HAPPEN: SEVERAL DAYS
IF YOU CHECKED OFF ANY PROBLEMS ON THIS QUESTIONNAIRE, HOW DIFFICULT HAVE THESE PROBLEMS MADE IT FOR YOU TO DO YOUR WORK, TAKE CARE OF THINGS AT HOME, OR GET ALONG WITH OTHER PEOPLE: SOMEWHAT DIFFICULT
GAD7 TOTAL SCORE: 9
3. WORRYING TOO MUCH ABOUT DIFFERENT THINGS: SEVERAL DAYS

## 2024-10-25 ASSESSMENT — PATIENT HEALTH QUESTIONNAIRE - PHQ9
5. POOR APPETITE OR OVEREATING: MORE THAN HALF THE DAYS
SUM OF ALL RESPONSES TO PHQ QUESTIONS 1-9: 4

## 2024-10-25 NOTE — PATIENT INSTRUCTIONS
Follow up with your primary care provider for your other medical problems.  Continue self breast exam.  Increase physical activity and exercise.  Lab and pap smear results will be called to the patient.  Co-testing done today and will repeat in 5yrs if normal.    Usual safety and preventative measures counseling done.  BMI >25  Weight loss encouraged and applauded.  Flu Shot and covid booster today.

## 2024-10-30 LAB
HPV HR 12 DNA CVX QL NAA+PROBE: NEGATIVE
HPV16 DNA CVX QL NAA+PROBE: NEGATIVE
HPV18 DNA CVX QL NAA+PROBE: NEGATIVE
HUMAN PAPILLOMA VIRUS FINAL DIAGNOSIS: NORMAL

## 2024-10-31 LAB
BKR AP ASSOCIATED HPV REPORT: NORMAL
BKR LAB AP GYN ADEQUACY: NORMAL
BKR LAB AP GYN INTERPRETATION: NORMAL
BKR LAB AP PREVIOUS ABNORMAL: NORMAL
PATH REPORT.COMMENTS IMP SPEC: NORMAL
PATH REPORT.COMMENTS IMP SPEC: NORMAL
PATH REPORT.RELEVANT HX SPEC: NORMAL

## 2024-12-17 ENCOUNTER — TELEPHONE (OUTPATIENT)
Dept: SURGERY | Facility: CLINIC | Age: 39
End: 2024-12-17
Payer: COMMERCIAL

## 2024-12-17 NOTE — TELEPHONE ENCOUNTER
Pt called to check if got rescheduled from Dec to April if could still get refills.  Informed pt that we will do this.  Also if loses coverage options available.  Felicita Jaquez, MS, RD, RN

## 2025-03-05 DIAGNOSIS — E66.812 CLASS 2 OBESITY DUE TO EXCESS CALORIES WITHOUT SERIOUS COMORBIDITY WITH BODY MASS INDEX (BMI) OF 35.0 TO 35.9 IN ADULT: ICD-10-CM

## 2025-03-05 DIAGNOSIS — Z86.32 HISTORY OF GESTATIONAL DIABETES: ICD-10-CM

## 2025-03-05 DIAGNOSIS — E66.09 CLASS 2 OBESITY DUE TO EXCESS CALORIES WITHOUT SERIOUS COMORBIDITY WITH BODY MASS INDEX (BMI) OF 35.0 TO 35.9 IN ADULT: ICD-10-CM

## 2025-03-06 ENCOUNTER — TELEPHONE (OUTPATIENT)
Dept: SURGERY | Facility: CLINIC | Age: 40
End: 2025-03-06
Payer: COMMERCIAL

## 2025-03-06 RX ORDER — SEMAGLUTIDE 2.4 MG/.75ML
INJECTION, SOLUTION SUBCUTANEOUS
Qty: 3 ML | Refills: 3 | Status: SHIPPED | OUTPATIENT
Start: 2025-03-06

## 2025-03-06 NOTE — TELEPHONE ENCOUNTER
Pt was rescheduled by clinic due to staffing to from Dec.  Unable to make her appt 3/10/25 and had to reschedule to June.  Last CMP 7/1/24 all NL except gluc 122.  Will refill per clinic protocol.  Felicita Jaquez, MS, RD, RN

## 2025-03-06 NOTE — TELEPHONE ENCOUNTER
General Call    Contacts       Contact Date/Time Type Contact Phone/Fax    03/06/2025 11:35 AM CST Phone (Incoming) Abi Dawkins (Self) [phone] (M)          Reason for Call:  med refill    What are your questions or concerns:  pt had to reschedule her 3/10 appt and would like to know if wegovy can be refilled until her June appt    [facility] or would you prefer to receive a phone call?:   Patient would prefer a phone call   Okay to leave a detailed message?: Yes at Cell number on file:    Telephone Information:   Mobile [phone]

## 2025-03-06 NOTE — TELEPHONE ENCOUNTER
Pt was rescheduled by clinic due to staffing to from Dec.  Unable to make her appt 3/10/25 and had to reschedule to June.  Last CMP 7/1/24 all NL except gluc 122.  Will refill per clinic protocol.  Will update pt via [facility] since uses [facility] regularly.   Felicita Jaquez, MS, RD, RN

## 2025-03-13 ENCOUNTER — OFFICE VISIT (OUTPATIENT)
Dept: SURGERY | Facility: CLINIC | Age: 40
End: 2025-03-13
Payer: COMMERCIAL

## 2025-03-13 VITALS
WEIGHT: 178.2 LBS | HEART RATE: 67 BPM | DIASTOLIC BLOOD PRESSURE: 87 MMHG | SYSTOLIC BLOOD PRESSURE: 119 MMHG | OXYGEN SATURATION: 98 % | BODY MASS INDEX: 29.69 KG/M2 | HEIGHT: 65 IN

## 2025-03-13 DIAGNOSIS — E66.3 OVERWEIGHT (BMI 25.0-29.9): Primary | ICD-10-CM

## 2025-03-13 DIAGNOSIS — Z86.39 HISTORY OF OBESITY: ICD-10-CM

## 2025-03-13 RX ORDER — TOPIRAMATE 50 MG/1
50 TABLET, FILM COATED ORAL DAILY
Qty: 90 TABLET | Refills: 1 | Status: SHIPPED | OUTPATIENT
Start: 2025-03-13

## 2025-03-13 NOTE — PROGRESS NOTES
3/13/2025      Return Medical Weight Management Note     Abi Dawkins  MRN:  [id number]  :  1985    Assessment & Plan   Problem List Items Addressed This Visit       Overweight (BMI 25.0-29.9) - Primary     Pt was congratulated on weight loss efforts and stopping daily alcohol consumption. Continue Topiramate 50mg daily and Wegovy 2.4mg weekly. Discussed option to space out Wegovy dose every 10-14 days and monitor hunger, if looking at stopping Wegovy in future. Repeat BMP ordered and has future HgbA1C that was ordered. We reviewed recommendations for muscle conservation including eating three meals daily, good protein intake, and strength training.           Relevant Orders    Basic metabolic panel    History of obesity    Relevant Orders    Basic metabolic panel        Phentermine:   Working well but causing problem with sleep- early waking.   Topiramate:     Candidate, drinks 1 beers a night, helping with snacking, portion control, Using Condoms only for BC.    GLP-1:             Wegovy covered.    Naltrexone:      Tried Contrave and did not feel effective  Wellbutrin:       Tried Contrave and did not feel effective  Metformin:       No DM, Pre DM  Contrave:        Tried and did not feel effective  Qsymia:           Not covered       PATIENT INSTRUCTIONS:    Continue Topiramate once daily and Wegovy 2.4mg weekly. Contact us on [facility] with any dosage questions/concerns or if you need refills.  2. Labs ordered.  Please call [phone] to set up a lab appt.     Goals:  Strive to drink 64oz water throughout the day.  Great job getting activity in throughout the week! Strength train twice a week to preserve muscle.       Follow up:    You already have an appointment scheduled with Kamila Kan on 25. Call [phone] to schedule next visit in October with Kamila.      34 minutes spent on the date of the encounter doing chart review, history and exam, result review, counseling, developing plan of  care, documentation, and further activities as noted      INTERVAL HISTORY:  Abi returns for medical weight management follow up.  Last seen on 7/29/24 with Kamila Kan PA-C and plan at that time was to stop Phentermine due to insomnia, continue Topiramate and Wegovy.    January she had very low appetite, was unmotivated to eat, and stopped Topiramate for 2 weeks. In those 2 weeks had an increase in hunger. Now only takes it once a day at bedtime and prefers that dose. Is also taking 2.4mg weekly Wegovy and is liking that dose. Constipation is reduced. Pt is experiencing hair loss. Doesn't want to be on Wegovy long term. Discussed obesity as a chronic disease and risk of weight regain when stopping medication.    Is eating 3 meals daily. Snacking is much more managed.   Breakfast: protein shake + 1/2 bagel w/ cream cheese  Lunch: leftovers or salad with chicken and citrus dressing  Dinner: chicken or pork or steak with vegetable + rice  Portion sizes are smaller.  Snacks: yogurt, cheez-its    Activity: has free weights at home office. Adult dodgeball--did not feel out of breath or excessively sweat. Plays outside with kids. Walks dog. Has walking pad with desk.    Hydration: No longer interested in drinking beer. Stopped daily ETOH use. Drinks water at night.     7/1/24 CMP showed improvement in Creatinine at 1.05. Had future order placed for HgbA1C. Will repeat BMP since she started Wegovy after most recent CMP.    WEIGHT METRICS:  Body mass index is 29.65 kg/m .   Current Weight: 178 lb 3.2 oz (80.8 kg)  Last Visits Weight: 213 lb (96.6 kg)  Initial Weight (lbs): 228 lbs  Cumulative weight loss (lbs): 49.8  Weight Loss Percentage: 21.84%    Wt Readings from Last 10 Encounters:   03/13/25 178 lb 3.2 oz (80.8 kg)   10/25/24 199 lb (90.3 kg)   07/29/24 213 lb (96.6 kg)   07/18/24 212 lb (96.2 kg)   06/13/24 210 lb 1.6 oz (95.3 kg)   03/07/24 206 lb (93.4 kg)   12/08/23 213 lb (96.6 kg)   11/30/23 213 lb (96.6  kg)   08/14/23 228 lb (103.4 kg)   04/21/23 227 lb (103 kg)                 3/12/2025    12:45 PM   Weight Loss Medication History Reviewed With Patient   Which weight loss medications are you currently taking on a regular basis? Topamax (topiramate)    Wegovy   Are you having any side effects from the weight loss medication that we have prescribed you? Yes   If you are having side effects please describe: Constipation           3/3/2025     1:55 PM   Changes and Difficulties   I have made the following changes to my diet since my last visit: N/a   With regards to my diet, I am still struggling with: High protein at every meal   I have made the following changes to my activity/exercise since my last visit: Free weights at my desk suring downtime   With regards to my activity/exercise, I am still struggling with: N/a       LABS:  Hemoglobin A1C   Date Value Ref Range Status   04/21/2023 5.3 0.0 - 5.6 % Final     Comment:     Normal <5.7%   Prediabetes 5.7-6.4%    Diabetes 6.5% or higher     Note: Adopted from ADA consensus guidelines.   09/21/2018 5.0 0 - 5.6 % Final     Comment:     Normal <5.7% Prediabetes 5.7-6.4%  Diabetes 6.5% or higher - adopted from ADA   consensus guidelines.       Creatinine   Date Value Ref Range Status   07/01/2024 1.05 (H) 0.51 - 0.95 mg/dL Final   12/06/2019 0.94 0.52 - 1.04 mg/dL Final     GFR Estimate   Date Value Ref Range Status   07/01/2024 70 >60 mL/min/1.73m2 Final     Comment:     eGFR calculated using 2021 CKD-EPI equation.   12/06/2019 80 >60 mL/min/[1.73_m2] Final     Comment:     Non  GFR Calc  Starting 12/18/2018, serum creatinine based estimated GFR (eGFR) will be   calculated using the Chronic Kidney Disease Epidemiology Collaboration   (CKD-EPI) equation.       Carbon Dioxide   Date Value Ref Range Status   09/21/2018 31 20 - 32 mmol/L Final     Carbon Dioxide (CO2)   Date Value Ref Range Status   07/01/2024 25 22 - 29 mmol/L Final   10/05/2022 20 20 - 32  "mmol/L Final     Chloride   Date Value Ref Range Status   07/01/2024 105 98 - 107 mmol/L Final   10/05/2022 111 (H) 94 - 109 mmol/L Final   09/21/2018 102 94 - 109 mmol/L Final     Urea Nitrogen   Date Value Ref Range Status   07/01/2024 13.7 6.0 - 20.0 mg/dL Final   10/05/2022 10 7 - 30 mg/dL Final   09/21/2018 19 7 - 30 mg/dL Final     ALT   Date Value Ref Range Status   07/01/2024 12 0 - 50 U/L Final     Comment:     Reference intervals for this test were updated on 6/12/2023 to more accurately reflect our healthy population. There may be differences in the flagging of prior results with similar values performed with this method. Interpretation of those prior results can be made in the context of the updated reference intervals.     09/21/2018 22 0 - 50 U/L Final     AST   Date Value Ref Range Status   07/01/2024 24 0 - 45 U/L Final     Comment:     Reference intervals for this test were updated on 6/12/2023 to more accurately reflect our healthy population. There may be differences in the flagging of prior results with similar values performed with this method. Interpretation of those prior results can be made in the context of the updated reference intervals.   09/21/2018 23 0 - 45 U/L Final     TSH   Date Value Ref Range Status   07/01/2024 1.29 0.30 - 4.20 uIU/mL Final   12/06/2019 0.95 0.40 - 4.00 mU/L Final        BP Readings from Last 6 Encounters:   03/13/25 119/87   10/25/24 118/64   07/29/24 120/85   07/18/24 110/74   06/13/24 104/74   03/07/24 108/54       Pulse Readings from Last 6 Encounters:   03/13/25 67   07/29/24 61   07/18/24 79   06/13/24 75   03/07/24 75   11/30/23 73       PE:  /87   Pulse 67   Ht 5' 5\" (1.651 m)   Wt 178 lb 3.2 oz (80.8 kg)   SpO2 98%   BMI 29.65 kg/m    GENERAL: Healthy, alert and no distress  RESP: No audible wheeze, cough, or visible cyanosis.  No visible retractions or increased work of breathing.    SKIN: Visible skin clear. No significant rash, abnormal " pigmentation or lesions.  PSYCH: Mentation appears normal, affect normal/bright, judgement and insight intact      Joyce Hernandez, NP

## 2025-03-13 NOTE — ASSESSMENT & PLAN NOTE
Pt was congratulated on weight loss efforts and stopping daily alcohol consumption. Continue Topiramate 50mg daily and Wegovy 2.4mg weekly. Discussed option to space out Wegovy dose every 10-14 days and monitor hunger, if looking at stopping Wegovy in future. Repeat BMP ordered and has future HgbA1C that was ordered. We reviewed recommendations for muscle conservation including eating three meals daily, good protein intake, and strength training.

## 2025-03-13 NOTE — PATIENT INSTRUCTIONS
It was nice to talk with you today! Below is the plan discussed.-  ARTHUR Cook      Pt Instructions:  Continue Topiramate once daily and Wegovy 2.4mg weekly. Contact us on MyChart with any dosage questions/concerns or if you need refills.  2. Labs ordered.  Please call [phone] to set up a lab appt.     Goals:  Strive to drink 64oz water throughout the day.  Great job getting activity in throughout the week! Strength train twice a week to preserve muscle.       Follow up:    You already have an appointment scheduled with Kamila Kan on 6/26/25. Call [phone] to schedule next visit in October with Kamila.      Telogen Effluvium (Hair Loss)  What Is It?  At any given time, about 85% to 90% of the hairs on the average person's head are actively growing (the anagen phase) and the others are resting (the telogen phase). Typically, a hair is in the anagen phase for two to four years, then enters the telogen phase, rests for about two to four months, and then falls out and is replaced by a new, growing hair. The average person naturally loses about 100 hairs a day.  In a person with telogen effluvium, some body change or shock pushes more hairs into the telogen phase. About 30% of the hairs stop growing and go into the resting phase before falling out. So you may lose an average of 300 hairs a day instead of 100.  Telogen effluvium can be triggered by a number of different events, including:  Surgery (like wt. loss surgery)  Extreme weight loss  Extreme change in diet  Abrupt hormonal changes, including those associated with childbirth and menopause  Iron deficiency  Hypothyroidism or hyperthyroidism  Because hairs that enter the telogen phase rest in place for two to four months before falling out, you may not notice any hair loss until two to four months after the event that caused the problem. Telogen effluvium rarely lasts longer than six months.  Although losing a great number of hairs within a short time  can be frightening, the condition is  temporary. Each hair pushed out is replaced by a new, growing hair. Because hair on the scalp grows slowly, your hair may feel or look thinner than usual for a time but fullness will return as the new hairs grow in.  Expected Duration  Typically, hair loss begins two to four months after the event that triggered the problem, and lasts approximately six months. New hairs begin growing immediately after the hair falls out, but significant growth may not be noticed for several months.  Prevention  Nothing can be done to prevent most of the types of physical shock that can start telogen effluvium.   Treatment  No treatment for active telogen effluvium has been proven effective.

## 2025-06-26 RX ORDER — TRAZODONE HYDROCHLORIDE 50 MG/1
TABLET ORAL
COMMUNITY
Start: 2025-02-24

## 2025-06-26 NOTE — PROGRESS NOTES
2025      Return Medical Weight Management Note     Abi Dawkins  MRN:  [id number]  :  1985    Assessment & Plan   Problem List Items Addressed This Visit       Overweight (BMI 25.0-29.9) - Primary    Pt was congratulated on weight loss success thus far. Pt will continue Wegovy 2.4mg weekly and 25mg Topiramate nightly. Discussed spacing out injections to every 10-14 days off-label to see if hunger is still well-managed. We reviewed recommendations for muscle conservation including eating three meals daily, good protein intake, and strength training. Encouraged hydration and reminded pt about pending BMP lab order.         Relevant Medications    Semaglutide-Weight Management (WEGOVY) 2.4 MG/0.75ML pen    topiramate (TOPAMAX) 25 MG tablet    History of obesity    Relevant Medications    Semaglutide-Weight Management (WEGOVY) 2.4 MG/0.75ML pen    topiramate (TOPAMAX) 25 MG tablet        AOM Considerations:  Phentermine:   Working well but causing problem with sleep- early waking.   Topiramate:     Candidate, drinks 1 beers a night, helping with snacking, portion control, Using Condoms only for BC.  On currently.  GLP-1:             Wegovy covered, currently taking.   Naltrexone:      Tried Contrave and did not feel effective  Wellbutrin:       Tried Contrave and did not feel effective  Metformin:       No DM, Pre DM  Contrave:        Tried and did not feel effective  Qsymia:           Not covered       PATIENT INSTRUCTIONS:  Continue 2.4mg Wegovy. Can try spacing out doses to every 10-14 days and monitoring your hunger response.  Continue 25mg Topiramate nightly.  Labs ordered.  Please call [phone] to set up a lab appt.      Goals:  Great job eating 3 meals a day and getting protein in with each meal.  Strive to drink 64oz water throughout the day.  Keep up the activity with your kids!      Follow up:    Call [phone] to schedule next visit in October with Joyce Hernandez NP. Already  scheduled 10/20/25.    23 minutes spent on the date of the encounter doing chart review, history and exam, result review, counseling, developing plan of care, documentation, and further activities as noted      INTERVAL HISTORY:  Abi returns for medical weight management follow up.  Last seen on 3/13/25 with Kamila Kan PA-C and myself, and plan at that time was to continue Topiramate once daily and Wegovy 2.4mg weekly.    Had one bout of nausea after eating a large breakfast, but it resolved and reports no side effects with Wegovy. Has been spacing injections out intermittently to every 7-10 days and will notice she gets hungrier on the last few days before next injection. Is taking 25mg Topiramate in the evenings to help with snacking on max dose of Wegovy. Pt feels good with her weight currently. Is noticing she has more energy and has been able to climb hills without feeling out of breath.    WEIGHT METRICS:  Body mass index is 28.67 kg/m .   Current Weight: 172 lb 4.8 oz (78.2 kg)  Last Visits Weight: 178 lb 3.2 oz (80.8 kg)  Initial Weight (lbs): 228 lbs  Cumulative weight loss (lbs): 55.7  Weight Loss Percentage: 24.43%    Wt Readings from Last 10 Encounters:   06/30/25 172 lb 4.8 oz (78.2 kg)   03/13/25 178 lb 3.2 oz (80.8 kg)   10/25/24 199 lb (90.3 kg)   07/29/24 213 lb (96.6 kg)   07/18/24 212 lb (96.2 kg)   06/13/24 210 lb 1.6 oz (95.3 kg)   03/07/24 206 lb (93.4 kg)   12/08/23 213 lb (96.6 kg)   11/30/23 213 lb (96.6 kg)   08/14/23 228 lb (103.4 kg)                 6/26/2025     9:43 AM   Weight Loss Medication History Reviewed With Patient   Which weight loss medications are you currently taking on a regular basis? Topamax (topiramate)    Wegovy   Are you having any side effects from the weight loss medication that we have prescribed you? No           6/26/2025     9:43 AM   Changes and Difficulties   I have made the following changes to my diet since my last visit: No changes   With regards to  my diet, I am still struggling with: Balanced meals. Struggling to find things that are good form me that I want to eat   I have made the following changes to my activity/exercise since my last visit: No changes   With regards to my activity/exercise, I am still struggling with: Na   B: Bagel w/ cream cheese  L: Leftovers or [employer] or [employer]ami w/ cheese + crackers  D: meat + vegetable + rice  Snacks: crackers or chips or popcorn  No longer snacking in evenings since taking Topiramate in evenings.    Hydration: Bubble water. Latte in AM. Otherwise gets approximately 20-30oz water throughout the day.  No longer drinking beer as much since being on Topiramate.    Activity: Active with kids. Plays catch or basketball with the kids. Takes dog for a walk[place]. Has hand weights by her desk she does for her upper body and with squats.       LABS:  Hemoglobin A1C   Date Value Ref Range Status   04/21/2023 5.3 0.0 - 5.6 % Final     Comment:     Normal <5.7%   Prediabetes 5.7-6.4%    Diabetes 6.5% or higher     Note: Adopted from ADA consensus guidelines.   09/21/2018 5.0 0 - 5.6 % Final     Comment:     Normal <5.7% Prediabetes 5.7-6.4%  Diabetes 6.5% or higher - adopted from ADA   consensus guidelines.       Creatinine   Date Value Ref Range Status   07/01/2024 1.05 (H) 0.51 - 0.95 mg/dL Final   12/06/2019 0.94 0.52 - 1.04 mg/dL Final     GFR Estimate   Date Value Ref Range Status   07/01/2024 70 >60 mL/min/1.73m2 Final     Comment:     eGFR calculated using 2021 CKD-EPI equation.   12/06/2019 80 >60 mL/min/[1.73_m2] Final     Comment:     Non  GFR Calc  Starting 12/18/2018, serum creatinine based estimated GFR (eGFR) will be   calculated using the Chronic Kidney Disease Epidemiology Collaboration   (CKD-EPI) equation.       Carbon Dioxide   Date Value Ref Range Status   09/21/2018 31 20 - 32 mmol/L Final     Carbon Dioxide (CO2)   Date Value Ref Range Status   07/01/2024 25 22 - 29  "mmol/L Final   10/05/2022 20 20 - 32 mmol/L Final     Chloride   Date Value Ref Range Status   07/01/2024 105 98 - 107 mmol/L Final   10/05/2022 111 (H) 94 - 109 mmol/L Final   09/21/2018 102 94 - 109 mmol/L Final     Urea Nitrogen   Date Value Ref Range Status   07/01/2024 13.7 6.0 - 20.0 mg/dL Final   10/05/2022 10 7 - 30 mg/dL Final   09/21/2018 19 7 - 30 mg/dL Final     ALT   Date Value Ref Range Status   07/01/2024 12 0 - 50 U/L Final     Comment:     Reference intervals for this test were updated on 6/12/2023 to more accurately reflect our healthy population. There may be differences in the flagging of prior results with similar values performed with this method. Interpretation of those prior results can be made in the context of the updated reference intervals.     09/21/2018 22 0 - 50 U/L Final     AST   Date Value Ref Range Status   07/01/2024 24 0 - 45 U/L Final     Comment:     Reference intervals for this test were updated on 6/12/2023 to more accurately reflect our healthy population. There may be differences in the flagging of prior results with similar values performed with this method. Interpretation of those prior results can be made in the context of the updated reference intervals.   09/21/2018 23 0 - 45 U/L Final     TSH   Date Value Ref Range Status   07/01/2024 1.29 0.30 - 4.20 uIU/mL Final   12/06/2019 0.95 0.40 - 4.00 mU/L Final        BP Readings from Last 6 Encounters:   06/30/25 114/80   03/13/25 119/87   10/25/24 118/64   07/29/24 120/85   07/18/24 110/74   06/13/24 104/74       Pulse Readings from Last 6 Encounters:   06/30/25 73   03/13/25 67   07/29/24 61   07/18/24 79   06/13/24 75   03/07/24 75       PE:  /80   Pulse 73   Ht 5' 5\" (1.651 m)   Wt 172 lb 4.8 oz (78.2 kg)   SpO2 98%   BMI 28.67 kg/m    GENERAL: Healthy, alert and no distress  RESP: No audible wheeze, cough, or visible cyanosis.  No visible retractions or increased work of breathing.    SKIN: Visible skin " clear. No significant rash, abnormal pigmentation or lesions.  PSYCH: Mentation appears normal, affect normal/bright, judgement and insight intact

## 2025-06-30 ENCOUNTER — OFFICE VISIT (OUTPATIENT)
Dept: SURGERY | Facility: CLINIC | Age: 40
End: 2025-06-30
Payer: COMMERCIAL

## 2025-06-30 VITALS
WEIGHT: 172.3 LBS | DIASTOLIC BLOOD PRESSURE: 80 MMHG | SYSTOLIC BLOOD PRESSURE: 114 MMHG | BODY MASS INDEX: 28.71 KG/M2 | OXYGEN SATURATION: 98 % | HEART RATE: 73 BPM | HEIGHT: 65 IN

## 2025-06-30 DIAGNOSIS — Z86.39 HISTORY OF OBESITY: ICD-10-CM

## 2025-06-30 DIAGNOSIS — E66.3 OVERWEIGHT (BMI 25.0-29.9): Primary | ICD-10-CM

## 2025-06-30 PROCEDURE — 99213 OFFICE O/P EST LOW 20 MIN: CPT | Performed by: NURSE PRACTITIONER

## 2025-06-30 PROCEDURE — 3079F DIAST BP 80-89 MM HG: CPT | Performed by: NURSE PRACTITIONER

## 2025-06-30 PROCEDURE — 3074F SYST BP LT 130 MM HG: CPT | Performed by: NURSE PRACTITIONER

## 2025-06-30 PROCEDURE — G2211 COMPLEX E/M VISIT ADD ON: HCPCS | Performed by: NURSE PRACTITIONER

## 2025-06-30 RX ORDER — TOPIRAMATE 25 MG/1
25 TABLET, FILM COATED ORAL DAILY
Qty: 30 TABLET | Refills: 3 | Status: SHIPPED | OUTPATIENT
Start: 2025-06-30

## 2025-06-30 RX ORDER — SEMAGLUTIDE 2.4 MG/.75ML
2.4 INJECTION, SOLUTION SUBCUTANEOUS WEEKLY
Qty: 3 ML | Refills: 3 | Status: SHIPPED | OUTPATIENT
Start: 2025-06-30

## 2025-06-30 NOTE — PATIENT INSTRUCTIONS
It was nice to talk with you today! Below is the plan discussed.-  ARTHUR Cook      Pt Instructions:  Continue 2.4mg Wegovy. Can try spacing out doses to every 10-14 days and monitoring your hunger response.  Continue 25mg Topiramate nightly.  Labs ordered.  Please call [phone] to set up a lab appt.      Goals:  Great job eating 3 meals a day and getting protein in with each meal.  Strive to drink 64oz water throughout the day.  Keep up the activity with your kids!     Follow up:    Call [phone] to schedule next visit in October with Joyce Hernandez NP. Already scheduled 10/20/25.

## 2025-06-30 NOTE — ASSESSMENT & PLAN NOTE
Pt was congratulated on weight loss success thus far. Pt will continue Wegovy 2.4mg weekly and 25mg Topiramate nightly. Discussed spacing out injections to every 10-14 days off-label to see if hunger is still well-managed. We reviewed recommendations for muscle conservation including eating three meals daily, good protein intake, and strength training. Encouraged hydration and reminded pt about pending BMP lab order.

## (undated) DEVICE — DRAPE BREAST/CHEST 29420

## (undated) DEVICE — DAVINCI XI DRAPE ARM 470015

## (undated) DEVICE — LIGHT HANDLE X2

## (undated) DEVICE — ESU GROUND PAD UNIVERSAL W/O CORD

## (undated) DEVICE — PACK LAP CHOLE SLC15LCFSD

## (undated) DEVICE — DAVINCI XI FCP BIPOLAR FENESTRATED 470205

## (undated) DEVICE — GLOVE PROTEXIS W/NEU-THERA 6.5  2D73TE65

## (undated) DEVICE — SYR 30ML LL W/O NDL 302832

## (undated) DEVICE — SU VICRYL 0 UR-6 27" J603H

## (undated) DEVICE — DAVINCI XI GRASPER FENESTRATED TIP UP 8MM 470347

## (undated) DEVICE — DAVINCI HOT SHEARS TIP COVER  400180

## (undated) DEVICE — SOL WATER IRRIG 1000ML BOTTLE 2F7114

## (undated) DEVICE — SYSTEM LAPAROVUE VISIBILITY LAPVUE10

## (undated) DEVICE — SYR 10ML FINGER CONTROL W/O NDL 309695

## (undated) DEVICE — DAVINCI XI SEAL UNIVERSAL 5-8MM 470361

## (undated) DEVICE — BLADE KNIFE SURG 11 371111

## (undated) DEVICE — SU MONOCRYL 4-0 PS-2 18" UND Y496G

## (undated) DEVICE — LUBRICANT INST ELECTROLUBE EL101

## (undated) DEVICE — CATH CHOLANGIOGRAM KUMAR CC-019

## (undated) DEVICE — PREP CHLORAPREP 26ML TINTED HI-LITE ORANGE 930815

## (undated) DEVICE — DAVINCI XI MONOPOLAR SCISSORS HOT SHEARS 8MM 470179

## (undated) DEVICE — DAVINCI XI CLIP APPLIER MED HEM-O-LOK GREEN 470327

## (undated) DEVICE — ENDO POUCH UNIVERSAL RETRIEVAL SYSTEM INZII 5MM CD003

## (undated) DEVICE — ADH SKIN CLOSURE PREMIERPRO EXOFIN MICOR HV 0.5ML 3471

## (undated) DEVICE — CLIP ENDO HEMO-LOC GREEN MED/LG 544230

## (undated) DEVICE — DAVINCI XI OBTURATOR BLADELESS 8MM 470359

## (undated) DEVICE — DEVICE SUTURE PASSER 14GA WECK EFX EFXSP2

## (undated) DEVICE — LUBRICANT INST KIT ENDO-LUBE 220-90

## (undated) DEVICE — ENDO TROCAR FIRST ENTRY KII FIOS Z-THRD 05X100MM CTF03

## (undated) DEVICE — DAVINCI XI DRAPE COLUMN 470341

## (undated) DEVICE — LINEN TOWEL PACK X5 5464

## (undated) DEVICE — DRAPE SHEET REV FOLD 3/4 9349

## (undated) RX ORDER — FENTANYL CITRATE 0.05 MG/ML
INJECTION, SOLUTION INTRAMUSCULAR; INTRAVENOUS
Status: DISPENSED
Start: 2022-10-06

## (undated) RX ORDER — PROPOFOL 10 MG/ML
INJECTION, EMULSION INTRAVENOUS
Status: DISPENSED
Start: 2022-10-06

## (undated) RX ORDER — HYDROMORPHONE HCL IN WATER/PF 6 MG/30 ML
PATIENT CONTROLLED ANALGESIA SYRINGE INTRAVENOUS
Status: DISPENSED
Start: 2022-10-06

## (undated) RX ORDER — LIDOCAINE HYDROCHLORIDE 20 MG/ML
INJECTION, SOLUTION EPIDURAL; INFILTRATION; INTRACAUDAL; PERINEURAL
Status: DISPENSED
Start: 2022-10-06

## (undated) RX ORDER — HYDROMORPHONE HYDROCHLORIDE 1 MG/ML
INJECTION, SOLUTION INTRAMUSCULAR; INTRAVENOUS; SUBCUTANEOUS
Status: DISPENSED
Start: 2022-10-06

## (undated) RX ORDER — KETOROLAC TROMETHAMINE 15 MG/ML
INJECTION, SOLUTION INTRAMUSCULAR; INTRAVENOUS
Status: DISPENSED
Start: 2022-10-06

## (undated) RX ORDER — DEXAMETHASONE SODIUM PHOSPHATE 4 MG/ML
INJECTION, SOLUTION INTRA-ARTICULAR; INTRALESIONAL; INTRAMUSCULAR; INTRAVENOUS; SOFT TISSUE
Status: DISPENSED
Start: 2022-10-06

## (undated) RX ORDER — FENTANYL CITRATE 50 UG/ML
INJECTION, SOLUTION INTRAMUSCULAR; INTRAVENOUS
Status: DISPENSED
Start: 2022-10-06

## (undated) RX ORDER — ONDANSETRON 2 MG/ML
INJECTION INTRAMUSCULAR; INTRAVENOUS
Status: DISPENSED
Start: 2022-10-06

## (undated) RX ORDER — CEFAZOLIN SODIUM/WATER 2 G/20 ML
SYRINGE (ML) INTRAVENOUS
Status: DISPENSED
Start: 2022-10-06